# Patient Record
Sex: FEMALE | Race: WHITE | NOT HISPANIC OR LATINO | Employment: OTHER | URBAN - NONMETROPOLITAN AREA
[De-identification: names, ages, dates, MRNs, and addresses within clinical notes are randomized per-mention and may not be internally consistent; named-entity substitution may affect disease eponyms.]

---

## 2018-05-17 ENCOUNTER — OFFICE VISIT (OUTPATIENT)
Dept: MEDICAL GROUP | Facility: CLINIC | Age: 38
End: 2018-05-17
Payer: MEDICARE

## 2018-05-17 VITALS
OXYGEN SATURATION: 96 % | TEMPERATURE: 98.2 F | BODY MASS INDEX: 53.92 KG/M2 | DIASTOLIC BLOOD PRESSURE: 90 MMHG | RESPIRATION RATE: 16 BRPM | HEIGHT: 62 IN | HEART RATE: 92 BPM | WEIGHT: 293 LBS | SYSTOLIC BLOOD PRESSURE: 132 MMHG

## 2018-05-17 DIAGNOSIS — R51.9 FREQUENT HEADACHES: ICD-10-CM

## 2018-05-17 DIAGNOSIS — E66.01 MORBID OBESITY WITH BMI OF 50.0-59.9, ADULT (HCC): ICD-10-CM

## 2018-05-17 DIAGNOSIS — G44.59 OTHER COMPLICATED HEADACHE SYNDROME: ICD-10-CM

## 2018-05-17 DIAGNOSIS — I10 ESSENTIAL HYPERTENSION: ICD-10-CM

## 2018-05-17 PROBLEM — M79.671 FOOT PAIN, BILATERAL: Status: ACTIVE | Noted: 2018-05-17

## 2018-05-17 PROBLEM — Z87.828 HISTORY OF MOTOR VEHICLE ACCIDENT: Status: ACTIVE | Noted: 2018-05-17

## 2018-05-17 PROBLEM — J45.20 MILD INTERMITTENT ASTHMA: Status: ACTIVE | Noted: 2018-05-17

## 2018-05-17 PROBLEM — F79 MENTAL RETARDATION: Status: ACTIVE | Noted: 2018-05-17

## 2018-05-17 PROBLEM — M79.672 FOOT PAIN, BILATERAL: Status: ACTIVE | Noted: 2018-05-17

## 2018-05-17 PROCEDURE — 99214 OFFICE O/P EST MOD 30 MIN: CPT | Performed by: FAMILY MEDICINE

## 2018-05-17 RX ORDER — METFORMIN HYDROCHLORIDE 1000 MG/1
1000 TABLET, FILM COATED, EXTENDED RELEASE ORAL 2 TIMES DAILY
Qty: 60 TAB | Refills: 2 | Status: SHIPPED | OUTPATIENT
Start: 2018-05-17 | End: 2018-08-02 | Stop reason: SDUPTHER

## 2018-05-17 RX ORDER — BUTALBITAL, ACETAMINOPHEN AND CAFFEINE 300; 40; 50 MG/1; MG/1; MG/1
1 CAPSULE ORAL EVERY 4 HOURS PRN
Qty: 30 CAP | Refills: 0 | Status: SHIPPED | OUTPATIENT
Start: 2018-05-17 | End: 2018-05-31

## 2018-05-17 RX ORDER — GABAPENTIN 300 MG/1
300 CAPSULE ORAL 2 TIMES DAILY
COMMUNITY
Start: 2018-05-17 | End: 2018-05-31

## 2018-05-17 ASSESSMENT — PATIENT HEALTH QUESTIONNAIRE - PHQ9
5. POOR APPETITE OR OVEREATING: 0 - NOT AT ALL
SUM OF ALL RESPONSES TO PHQ QUESTIONS 1-9: 8
CLINICAL INTERPRETATION OF PHQ2 SCORE: 2

## 2018-05-17 NOTE — PROGRESS NOTES
Complaint: Multiple medical problems     Subjective:     Tarah Britton is a 37 y.o. female here today to discuss multiple medical problems as well as to establish care. [facility] [place]    Uncontrolled type 2 diabetes mellitus with neurologic complication, without long-term current use of insulin (HCC)  Has not seen a physician since last fall. Currently on metformin 1 g twice a day, NovoLog 8-15 units 3 times a day before meals, Glucotrol 5 mg twice a day, to Toujeo 50-60 units daily. Do not know the status of her control. She says she did go through education for bariatric surgery but did not go through with the surgery itself because her boyfriend was concerned that she might have complications.    Mild intermittent asthma  Uses her inhaler every couple of days.    Foot pain, bilateral  Chronic pain. She had surgical correction of club feet.     Other complicated headache syndrome  Gets headaches a couple times a month. Starts on the left on the right side of the head above the eye and spreads to the back of her head. His artery had 3 this month.    HTN (hypertension)  Currently on Diovan 160 mg a day.     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 Says she's been on disability or whole life.    Current medicines (including changes today)  Current Outpatient Prescriptions   Medication Sig Dispense Refill   • gabapentin (NEURONTIN) 300 MG Cap Take 1 Cap by mouth 2 Times a Day.     • metformin ER modified (GLUMETZA) 1000 MG TABLET SR 24 HR Take 1,000 Each by mouth 2 Times a Day. 60 Tab 2   • acetaminophen/caffeine/butalbital 300-40-50 mg (FIORICET) -40 MG Cap capsule Take 1 Cap by mouth every four hours as needed for Headache. 30 Cap 0   • NOVOLOG, insulin aspart, (NOVOLOG FLEXPEN) 100 UNIT/ML Solution Pen-injector injection Inject 8-15 Units as instructed 3 times a day before meals. 5 PEN 11   • Insulin Glargine (TOUJEO  SOLOSTAR SC) Inject 50-60 Units as instructed.     • valsartan (DIOVAN) 160 MG Tab Take 160 mg by mouth 2 times a day.     • glipiZIDE (GLUCOTROL) 5 MG TABS Take 1 Tab by mouth 2 times a day. 60 Each 3   • lovastatin (MEVACOR) 20 MG TABS Take 20 mg by mouth every day.     • Insulin Pen Needle 32G X 4 MM Misc 1 Each by Does not apply route 4 times a day. 120 Each 11   • albuterol (PROVENTIL) 2.5mg/3ml NEBU 3 mL by Nebulization route every four hours as needed for Shortness of Breath. 100 Bullet 3   • insulin 70/30 (HUMULIN,NOVOLIN) (70-30) 100 UNIT/ML SUSP Inject 10 Units as instructed 2 Times a Day.       No current facility-administered medications for this visit.      She  has a past medical history of ASTHMA; Depressed; Diabetes; DM (diabetes mellitus) (HCC); HTN (hypertension); Hyperlipidemia LDL goal <70; Psychiatric disorder; and Sleep apnea.    Health Maintenance: Need records      Allergies: Patient has no known allergies.    Current Outpatient Prescriptions Ordered in [facility]   Medication Sig Dispense Refill   • gabapentin (NEURONTIN) 300 MG Cap Take 1 Cap by mouth 2 Times a Day.     • metformin ER modified (GLUMETZA) 1000 MG TABLET SR 24 HR Take 1,000 Each by mouth 2 Times a Day. 60 Tab 2   • acetaminophen/caffeine/butalbital 300-40-50 mg (FIORICET) -40 MG Cap capsule Take 1 Cap by mouth every four hours as needed for Headache. 30 Cap 0   • NOVOLOG, insulin aspart, (NOVOLOG FLEXPEN) 100 UNIT/ML Solution Pen-injector injection Inject 8-15 Units as instructed 3 times a day before meals. 5 PEN 11   • Insulin Glargine (TOUJEO SOLOSTAR SC) Inject 50-60 Units as instructed.     • valsartan (DIOVAN) 160 MG Tab Take 160 mg by mouth 2 times a day.     • glipiZIDE (GLUCOTROL) 5 MG TABS Take 1 Tab by mouth 2 times a day. 60 Each 3   • lovastatin (MEVACOR) 20 MG TABS Take 20 mg by mouth every day.     • Insulin Pen Needle 32G X 4 MM Misc 1 Each by Does not apply route 4 times a day. 120 Each 11   • albuterol  "(PROVENTIL) 2.5mg/3ml NEBU 3 mL by Nebulization route every four hours as needed for Shortness of Breath. 100 Bullet 3   • insulin 70/30 (HUMULIN,NOVOLIN) (70-30) 100 UNIT/ML SUSP Inject 10 Units as instructed 2 Times a Day.       No current [facility]-ordered facility-administered medications on file.        Past Medical History:   Diagnosis Date   • ASTHMA    • Depressed    • Diabetes    • DM (diabetes mellitus) (HCC)    • HTN (hypertension)    • Hyperlipidemia LDL goal <70    • Psychiatric disorder     depressed   • Sleep apnea        Past Surgical History:   Procedure Laterality Date   • ABDOMINAL HYSTERECTOMY TOTAL     • APPENDECTOMY     • CHOLECYSTECTOMY     • CLUB FOOT RELEASE         Social History   Substance Use Topics   • Smoking status: Never Smoker   • Smokeless tobacco: Never Used   • Alcohol use Yes       Social History     Social History Narrative    ** Merged History Encounter **            Family History   Problem Relation Age of Onset   • Heart Disease Sister          ROS positive for pain in her feet.  Patient denies any fever, chills, unintentional weight gain/loss, fatigue, stroke symptoms, dizziness, headache, nasal congestion, sore-throat, cough, heartburn, chest pain, difficulty breathing, abdominal discomfort, diarrhea/constipation, burning with urination or frequency, joint or back pain, skin rashes, depression or anxiety.       Objective:     Blood pressure 132/90, pulse 92, temperature 36.8 °C (98.2 °F), resp. rate 16, height 1.575 m (5' 2\"), weight (!) 139.7 kg (308 lb), SpO2 96 %. Body mass index is 56.33 kg/m².   Physical Exam:  Constitutional: Alert, no distress. Morbidly obese. Slow reaction time.  Skin: Warm, dry, good turgor, no rashes in visible areas.  Eye: Equal, round and reactive, conjunctiva clear, lids normal.  ENMT: Lips without lesions, good dentition, oropharynx clear.  Neck: Trachea midline, no masses, no thyromegaly. No cervical or supraclavicular " lymphadenopathy  Respiratory: Unlabored respiratory effort, lungs clear to auscultation, no wheezes, no ronchi.  Cardiovascular: Normal S1, S2, no murmur, no extremity edema.  Abdomen: Soft, non-tender, no masses, no hepatosplenomegaly.  Psych: Alert and oriented x3, appropriate affect and mood.        Assessment and Plan:   The following treatment plan was discussed    1. Uncontrolled type 2 diabetes mellitus with diabetic neuropathy, without long-term current use of insulin (HCC)  Chronic problem, question control. We definitely benefit from bariatric surgery. Question her ability to abide by a strict diet however.  - metformin ER modified (GLUMETZA) 1000 MG TABLET SR 24 HR; Take 1,000 Each by mouth 2 Times a Day.  Dispense: 60 Tab; Refill: 2  - CBC WITH DIFFERENTIAL; Future  - COMP METABOLIC PANEL; Future  - HEMOGLOBIN A1C; Future  - LIPID PROFILE; Future  - TSH WITH REFLEX TO FT4; Future  - MICROALBUMIN CREAT RATIO URINE; Future    2. Morbid obesity with BMI of 50.0-59.9, adult (HCC)  Chronic problem. Would alleviate a number of issues. We'll refer her back in consultation. Will discuss situation with her boyfriend if needed. Might need echocardiogram prior to to clearance  - REFERRAL TO BARIATRIC SURGERY    3. Other complicated headache syndrome  Chronic problem. Trial therapy using Fioricet. Patient is agreeable to abide by terms of a controlled substance agreement. Low risk for abuse.  - acetaminophen/caffeine/butalbital 300-40-50 mg (FIORICET) -40 MG Cap capsule; Take 1 Cap by mouth every four hours as needed for Headache.  Dispense: 30 Cap; Refill: 0  - Controlled Substance Treatment Agreement  [facility]; Future    4. Essential hypertension  Chronic problem. Borderline control on current medical treatment.  Obtain records from [facility]    Followup: Return in about 1 week (around 5/24/2018).    Please note that this dictation was created using voice  recognition software. I have made every reasonable attempt to correct obvious errors, but I expect that there are errors of grammar and possibly content that I did not discover before finalizing the note.

## 2018-05-17 NOTE — ASSESSMENT & PLAN NOTE
Gets headaches a couple times a month. Starts on the left on the right side of the head above the eye and spreads to the back of her head. His artery had 3 this month.

## 2018-05-17 NOTE — ASSESSMENT & PLAN NOTE
Has not seen a physician since last fall. Currently on metformin 1 g twice a day, NovoLog 8-15 units 3 times a day before meals, Glucotrol 5 mg twice a day, to Toujeo 50-60 units daily. Do not know the status of her control. She says she did go through education for bariatric surgery but did not go through with the surgery itself because her boyfriend was concerned that she might have complications.

## 2018-05-18 LAB — HBA1C MFR BLD: 10 % (ref ?–5.8)

## 2018-05-23 ENCOUNTER — TELEPHONE (OUTPATIENT)
Dept: MEDICAL GROUP | Facility: PHYSICIAN GROUP | Age: 38
End: 2018-05-23

## 2018-05-23 ENCOUNTER — DOCUMENTATION (OUTPATIENT)
Dept: MEDICAL GROUP | Facility: PHYSICIAN GROUP | Age: 38
End: 2018-05-23

## 2018-05-23 NOTE — PROGRESS NOTES
PAR via [employer], was sent to our office. Due to her seeing provider at your location I am sending this.    KEY [id number]  Last name: Tobi  : 17    This is regarding the [place]

## 2018-05-24 PROBLEM — F79 MENTAL RETARDATION: Status: RESOLVED | Noted: 2018-05-17 | Resolved: 2018-05-24

## 2018-05-31 ENCOUNTER — OFFICE VISIT (OUTPATIENT)
Dept: MEDICAL GROUP | Facility: CLINIC | Age: 38
End: 2018-05-31
Payer: MEDICARE

## 2018-05-31 VITALS
RESPIRATION RATE: 18 BRPM | SYSTOLIC BLOOD PRESSURE: 128 MMHG | WEIGHT: 293 LBS | OXYGEN SATURATION: 94 % | DIASTOLIC BLOOD PRESSURE: 76 MMHG | HEART RATE: 96 BPM | BODY MASS INDEX: 53.92 KG/M2 | TEMPERATURE: 97.5 F | HEIGHT: 62 IN

## 2018-05-31 DIAGNOSIS — E66.01 MORBID OBESITY WITH BMI OF 50.0-59.9, ADULT (HCC): ICD-10-CM

## 2018-05-31 DIAGNOSIS — E78.2 MIXED HYPERLIPIDEMIA: ICD-10-CM

## 2018-05-31 DIAGNOSIS — R51.9 FREQUENT HEADACHES: ICD-10-CM

## 2018-05-31 DIAGNOSIS — E03.9 ACQUIRED HYPOTHYROIDISM: ICD-10-CM

## 2018-05-31 PROCEDURE — 99214 OFFICE O/P EST MOD 30 MIN: CPT | Performed by: FAMILY MEDICINE

## 2018-05-31 RX ORDER — LOVASTATIN 40 MG/1
40 TABLET ORAL DAILY
Qty: 30 TAB | Refills: 2 | Status: SHIPPED | OUTPATIENT
Start: 2018-05-31 | End: 2018-08-14 | Stop reason: SDUPTHER

## 2018-05-31 RX ORDER — LEVOTHYROXINE SODIUM 0.05 MG/1
50 TABLET ORAL
Qty: 30 TAB | Refills: 2 | Status: SHIPPED | OUTPATIENT
Start: 2018-05-31 | End: 2018-08-22

## 2018-05-31 RX ORDER — GABAPENTIN 300 MG/1
600 CAPSULE ORAL 2 TIMES DAILY
COMMUNITY
End: 2018-09-12 | Stop reason: SDUPTHER

## 2018-05-31 NOTE — ASSESSMENT & PLAN NOTE
Spoke to bariatric intake person. Has decided against surgery, basically because her sister had had some complications after her surgery. Reiterated that the surgery would help with most of her medical problems.

## 2018-05-31 NOTE — PROGRESS NOTES
Complaint: f/u labs     Subjective:     Tarah Britton is a 38 y.o. female here today for follow-up for her recent lab test.    Morbid obesity with BMI of 50.0-59.9, adult ([facility])  Spoke to bariatric intake person. Has decided against surgery, basically because her sister had had some complications after her surgery. Reiterated that the surgery would help with most of her medical problems.    Frequent headaches  Taking Motrin 400 mg with some relief. Insurance would not cover Fioricet.    Acquired hypothyroidism  Recent TSH 6.69. New diagnosia. Could explain partially why BS difficult to control.    Uncontrolled type 2 diabetes mellitus with neurologic complication, without long-term current use of insulin ([facility])  Recent HbA1c 10.0. Compliant with taking her oral med and insulins.    Mixed hyperlipidemia  Recent FLP with Tchol 198, , HDL 47, . On Mevacor 20mg only.     Will be switching to [facility] in [place] due to proximity to where she lives.    Current medicines (including changes today)  Current Outpatient Prescriptions   Medication Sig Dispense Refill   • levothyroxine (SYNTHROID) 50 MCG Tab Take 1 Tab by mouth Every morning on an empty stomach. 30 Tab 2   • gabapentin (NEURONTIN) 300 MG Cap Take 600 mg by mouth 2 Times a Day.     • metformin ER modified (GLUMETZA) 1000 MG TABLET SR 24 HR Take 1,000 Each by mouth 2 Times a Day. 60 Tab 2   • Insulin Pen Needle 32G X 4 MM Misc 1 Each by Does not apply route 4 times a day. 120 Each 11   • Insulin Glargine (TOUJEO SOLOSTAR SC) Inject 50-60 Units as instructed.     • valsartan (DIOVAN) 160 MG Tab Take 160 mg by mouth 2 times a day.     • glipiZIDE (GLUCOTROL) 5 MG TABS Take 1 Tab by mouth 2 times a day. 60 Each 3   • insulin 70/30 (HUMULIN,NOVOLIN) (70-30) 100 UNIT/ML SUSP Inject 10 Units as instructed 2 Times a Day.     • lovastatin (MEVACOR) 20 MG TABS Take 20 mg by mouth every day.     • albuterol (PROVENTIL) 2.5mg/3ml NEBU 3 mL by  Nebulization route every four hours as needed for Shortness of Breath. 100 Bullet 3     No current facility-administered medications for this visit.      She  has a past medical history of ASTHMA; Depressed; Diabetes; DM (diabetes mellitus) (HCC); HTN (hypertension); Hyperlipidemia LDL goal <70; Psychiatric disorder; and Sleep apnea.      Allergies: Patient has no known allergies.    Current Outpatient Prescriptions Ordered in [facility]   Medication Sig Dispense Refill   • levothyroxine (SYNTHROID) 50 MCG Tab Take 1 Tab by mouth Every morning on an empty stomach. 30 Tab 2   • gabapentin (NEURONTIN) 300 MG Cap Take 600 mg by mouth 2 Times a Day.     • metformin ER modified (GLUMETZA) 1000 MG TABLET SR 24 HR Take 1,000 Each by mouth 2 Times a Day. 60 Tab 2   • Insulin Pen Needle 32G X 4 MM Misc 1 Each by Does not apply route 4 times a day. 120 Each 11   • Insulin Glargine (TOUJEO SOLOSTAR SC) Inject 50-60 Units as instructed.     • valsartan (DIOVAN) 160 MG Tab Take 160 mg by mouth 2 times a day.     • glipiZIDE (GLUCOTROL) 5 MG TABS Take 1 Tab by mouth 2 times a day. 60 Each 3   • insulin 70/30 (HUMULIN,NOVOLIN) (70-30) 100 UNIT/ML SUSP Inject 10 Units as instructed 2 Times a Day.     • lovastatin (MEVACOR) 20 MG TABS Take 20 mg by mouth every day.     • albuterol (PROVENTIL) 2.5mg/3ml NEBU 3 mL by Nebulization route every four hours as needed for Shortness of Breath. 100 Bullet 3     No current [facility]-ordered facility-administered medications on file.        Past Medical History:   Diagnosis Date   • ASTHMA    • Depressed    • Diabetes    • DM (diabetes mellitus) (HCC)    • HTN (hypertension)    • Hyperlipidemia LDL goal <70    • Psychiatric disorder     depressed   • Sleep apnea        Past Surgical History:   Procedure Laterality Date   • ABDOMINAL HYSTERECTOMY TOTAL     • APPENDECTOMY     • CHOLECYSTECTOMY     • CLUB FOOT RELEASE         Social History   Substance Use Topics   • Smoking status: Never Smoker   •  "Smokeless tobacco: Never Used   • Alcohol use No       Social History     Social History Narrative    ** Merged History Encounter **            Family History   Problem Relation Age of Onset   • Heart Disease Sister          ROS Positive for headaches, numbness in feet, frequent urination  Patient denies any fever, chills, unintentional weight gain/loss, fatigue, stroke symptoms, dizziness, headache, nasal congestion, sore-throat, cough, heartburn, chest pain, difficulty breathing, abdominal discomfort, diarrhea/constipation, burning with urination , joint or back pain, skin rashes, depression or anxiety.       Objective:     Blood pressure 128/76, pulse 96, temperature 36.4 °C (97.5 °F), resp. rate 18, height 1.575 m (5' 2\"), weight (!) 139.7 kg (308 lb), SpO2 94 %. Body mass index is 56.33 kg/m².   Physical Exam:  Constitutional: Alert, no distress. Morbidly obese.  Psych: Alert and oriented x3, appropriate affect and mood.        Assessment and Plan:   The following treatment plan was discussed    1. Acquired hypothyroidism  New problem. Will start on levothyroxine, recheck in 6 weeks.  - levothyroxine (SYNTHROID) 50 MCG Tab; Take 1 Tab by mouth Every morning on an empty stomach.  Dispense: 30 Tab; Refill: 2  - TSH WITH REFLEX TO FT4; Future    2. Uncontrolled type 2 diabetes mellitus with diabetic neuropathy, without long-term current use of insulin (HCC)  Chronic problem, uncontrolled. Recommended she walk at increased pace 30 min 3x/day, wandering not helpful in her care. No change in meds. Needs to increase water intake.    3. Morbid obesity with BMI of 50.0-59.9, adult (HCC)  Chronic problem. Recommended again bariatric surgery.    4. Frequent headaches  Chronic problem. Continue ibuprofen prn.    5. Hyperlipidemia  Chronic problem. Increase Mevacor to 40mg. Recheck with CMP 3 months.      Followup: Return in about 6 weeks (around 7/12/2018).    Please note that this dictation was created using voice " recognition software. I have made every reasonable attempt to correct obvious errors, but I expect that there are errors of grammar and possibly content that I did not discover before finalizing the note.

## 2018-06-21 RX ORDER — VALSARTAN 160 MG/1
160 TABLET ORAL DAILY
Qty: 30 TAB | Refills: 5 | Status: SHIPPED | OUTPATIENT
Start: 2018-06-21 | End: 2018-08-15

## 2018-07-27 ENCOUNTER — PATIENT MESSAGE (OUTPATIENT)
Dept: MEDICAL GROUP | Facility: CLINIC | Age: 38
End: 2018-07-27

## 2018-08-02 NOTE — TELEPHONE ENCOUNTER
Was the patient seen in the last year in this department? Yes    Does patient have an active prescription for medications requested? Yes    Received Request Via: Pharmacy     Patients insurance is requesting a 90 day supply

## 2018-08-04 RX ORDER — METFORMIN HYDROCHLORIDE 1000 MG/1
TABLET, FILM COATED, EXTENDED RELEASE ORAL
Qty: 180 TAB | Refills: 1 | Status: SHIPPED | OUTPATIENT
Start: 2018-08-04 | End: 2019-05-10 | Stop reason: SDUPTHER

## 2018-08-07 NOTE — TELEPHONE ENCOUNTER
Tadeo Gore M.D. routed conversation to Haroon Knight Ass't 3 days ago      Tadeo Gore M.D.   You 4 days ago      Well? Is she still seeing us? She was planning to transfer to [facility] in [place].     Kyara (Routing comment)       You routed conversation to Tadeo Gore M.D. 4 days ago      Tadeo Gore M.D.   [facility] 5 days ago      Is she still seeing us?     Kyara (Routing comment)       Tadeo Gore M.D.   [facility] Knight [facility] 5 days ago      Call patient, find out who she is seeing now. If me, than she needs to get her thyroid tested and make f/u appointment. Then I will refill her meds.     Thanks.     Kyara (Routing comment)      Dr. Gore, she is scheduled with yo on 8/22/18.

## 2018-08-14 ENCOUNTER — PATIENT MESSAGE (OUTPATIENT)
Dept: MEDICAL GROUP | Facility: PHYSICIAN GROUP | Age: 38
End: 2018-08-14

## 2018-08-14 DIAGNOSIS — E78.2 MIXED HYPERLIPIDEMIA: ICD-10-CM

## 2018-08-14 RX ORDER — LOVASTATIN 40 MG/1
40 TABLET ORAL DAILY
Qty: 90 TAB | Refills: 1 | Status: SHIPPED | OUTPATIENT
Start: 2018-08-14 | End: 2019-02-06 | Stop reason: SDUPTHER

## 2018-08-14 NOTE — TELEPHONE ENCOUNTER
Was the patient seen in the last year in this department? Yes    Does patient have an active prescription for medications requested? Yes    Received Request Via: Pharmacy     Last visit 5/31/2018  Last labs 5/18/18

## 2018-08-15 DIAGNOSIS — I10 ESSENTIAL HYPERTENSION: ICD-10-CM

## 2018-08-15 RX ORDER — LOSARTAN POTASSIUM 50 MG/1
50 TABLET ORAL DAILY
Qty: 30 TAB | Refills: 5 | Status: SHIPPED | OUTPATIENT
Start: 2018-08-15 | End: 2018-08-22

## 2018-08-22 ENCOUNTER — OFFICE VISIT (OUTPATIENT)
Dept: MEDICAL GROUP | Facility: CLINIC | Age: 38
End: 2018-08-22
Payer: MEDICARE

## 2018-08-22 ENCOUNTER — HOSPITAL ENCOUNTER (OUTPATIENT)
Facility: MEDICAL CENTER | Age: 38
End: 2018-08-22
Attending: FAMILY MEDICINE
Payer: MEDICARE

## 2018-08-22 VITALS
DIASTOLIC BLOOD PRESSURE: 78 MMHG | SYSTOLIC BLOOD PRESSURE: 126 MMHG | BODY MASS INDEX: 53.92 KG/M2 | WEIGHT: 293 LBS | HEART RATE: 92 BPM | RESPIRATION RATE: 16 BRPM | OXYGEN SATURATION: 93 % | HEIGHT: 62 IN | TEMPERATURE: 98.6 F

## 2018-08-22 DIAGNOSIS — E03.9 ACQUIRED HYPOTHYROIDISM: ICD-10-CM

## 2018-08-22 DIAGNOSIS — I10 ESSENTIAL HYPERTENSION: ICD-10-CM

## 2018-08-22 DIAGNOSIS — E66.01 MORBID OBESITY WITH BMI OF 50.0-59.9, ADULT (HCC): ICD-10-CM

## 2018-08-22 LAB
CREAT UR-MCNC: 146.4 MG/DL
HBA1C MFR BLD: 11.6 % (ref ?–5.8)
INT CON NEG: NEGATIVE
INT CON POS: POSITIVE
MICROALBUMIN UR-MCNC: 101.9 MG/DL
MICROALBUMIN/CREAT UR: 696 MG/G (ref 0–30)

## 2018-08-22 PROCEDURE — 83036 HEMOGLOBIN GLYCOSYLATED A1C: CPT | Performed by: FAMILY MEDICINE

## 2018-08-22 PROCEDURE — 92250 FUNDUS PHOTOGRAPHY W/I&R: CPT | Mod: TC | Performed by: FAMILY MEDICINE

## 2018-08-22 PROCEDURE — 82570 ASSAY OF URINE CREATININE: CPT

## 2018-08-22 PROCEDURE — 99214 OFFICE O/P EST MOD 30 MIN: CPT | Performed by: FAMILY MEDICINE

## 2018-08-22 PROCEDURE — 82043 UR ALBUMIN QUANTITATIVE: CPT

## 2018-08-22 RX ORDER — LEVOTHYROXINE SODIUM 0.07 MG/1
75 TABLET ORAL
Qty: 30 TAB | Refills: 5 | Status: SHIPPED | OUTPATIENT
Start: 2018-08-22 | End: 2018-09-19 | Stop reason: SDUPTHER

## 2018-08-22 NOTE — PROGRESS NOTES
Complaint: f/u thyroid, DM     Subjective:     Tarah Britton is a 38 y.o. female here today for follow-up.    Uncontrolled type 2 diabetes mellitus with neurologic complication, without long-term current use of insulin ([facility])  A1c 11.6 today, up from 10 three months ago. On Tresiba, Novolin 70/30, glucotrol , and metformin. Goes walking 30 min twice a day only. Saw endo in [facility] about 1 year ago. No retinal check in years. Numbness in feet.    Acquired hypothyroidism  Latest TSH 3.91 on 50 mch Synthroid.    Morbid obesity with BMI of 50.0-59.9, adult ([facility])  Has put on 5 lbs since last visit in May.     No other concerns or complaints.    Current medicines (including changes today)  Current Outpatient Prescriptions   Medication Sig Dispense Refill   • levothyroxine (SYNTHROID) 75 MCG Tab Take 1 Tab by mouth Every morning on an empty stomach. 30 Tab 5   • lovastatin (MEVACOR) 40 MG tablet Take 1 Tab by mouth every day. 90 Tab 1   • metformin ER modified (GLUMETZA) 1000 MG TABLET SR 24 HR Take 1 tab twice a day 180 Tab 1   • gabapentin (NEURONTIN) 300 MG Cap Take 600 mg by mouth 2 Times a Day.     • Insulin Pen Needle 32G X 4 MM Misc 1 Each by Does not apply route 4 times a day. 120 Each 11   • glipiZIDE (GLUCOTROL) 5 MG TABS Take 1 Tab by mouth 2 times a day. 60 Each 3   • insulin 70/30 (HUMULIN,NOVOLIN) (70-30) 100 UNIT/ML SUSP Inject 10 Units as instructed 2 Times a Day.     • albuterol (PROVENTIL) 2.5mg/3ml NEBU 3 mL by Nebulization route every four hours as needed for Shortness of Breath. 100 Bullet 3     No current facility-administered medications for this visit.      She  has a past medical history of ASTHMA; Depressed; Diabetes; DM (diabetes mellitus) ([facility]); HTN (hypertension); Hyperlipidemia LDL goal <70; Psychiatric disorder; and Sleep apnea.    Health Maintenance: see list      Allergies: Patient has no known allergies.    Current Outpatient Prescriptions Ordered in [facility]   Medication Sig Dispense Refill   •  levothyroxine (SYNTHROID) 75 MCG Tab Take 1 Tab by mouth Every morning on an empty stomach. 30 Tab 5   • lovastatin (MEVACOR) 40 MG tablet Take 1 Tab by mouth every day. 90 Tab 1   • metformin ER modified (GLUMETZA) 1000 MG TABLET SR 24 HR Take 1 tab twice a day 180 Tab 1   • gabapentin (NEURONTIN) 300 MG Cap Take 600 mg by mouth 2 Times a Day.     • Insulin Pen Needle 32G X 4 MM Misc 1 Each by Does not apply route 4 times a day. 120 Each 11   • glipiZIDE (GLUCOTROL) 5 MG TABS Take 1 Tab by mouth 2 times a day. 60 Each 3   • insulin 70/30 (HUMULIN,NOVOLIN) (70-30) 100 UNIT/ML SUSP Inject 10 Units as instructed 2 Times a Day.     • albuterol (PROVENTIL) 2.5mg/3ml NEBU 3 mL by Nebulization route every four hours as needed for Shortness of Breath. 100 Bullet 3     No current [facility]-ordered facility-administered medications on file.        Past Medical History:   Diagnosis Date   • ASTHMA    • Depressed    • Diabetes    • DM (diabetes mellitus) (HCC)    • HTN (hypertension)    • Hyperlipidemia LDL goal <70    • Psychiatric disorder     depressed   • Sleep apnea        Past Surgical History:   Procedure Laterality Date   • ABDOMINAL HYSTERECTOMY TOTAL     • APPENDECTOMY     • CHOLECYSTECTOMY     • CLUB FOOT RELEASE         Social History   Substance Use Topics   • Smoking status: Never Smoker   • Smokeless tobacco: Never Used   • Alcohol use No       Social History     Social History Narrative    ** Merged History Encounter **            Family History   Problem Relation Age of Onset   • Heart Disease Sister          ROS   Patient denies any fever, chills, unintentional weight gain/loss, fatigue, stroke symptoms, dizziness, headache, nasal congestion, sore-throat, cough, heartburn, chest pain, difficulty breathing, abdominal discomfort, diarrhea/constipation, burning with urination or frequency, joint or back pain, skin rashes, depression or anxiety.       Objective:     Blood pressure 126/78, pulse 92, temperature 37 °C  "(98.6 °F), resp. rate 16, height 1.575 m (5' 2\"), weight (!) 142 kg (313 lb), SpO2 93 %. Body mass index is 57.25 kg/m².   Physical Exam:  Constitutional: Alert, no distress.    Diabetic Foot Exam:     Normal gross anatomy of bilateral feet without abnormal curvature or arch, no toe deformities  No ulcers/laceration/blisters present on bilateral feet; crusting skin between toes noted  Marked toenail discoloration and thickening, no ingrown toenails,   No difference in skin coloration or temperature between feet,   Bilateral dorsalis pedis and posterior tibial pulses 2+ and equal, Refill less than 2 seconds bilaterally,   Longs 10 g monofilament testing decreased  in bilateral great toes, bilateral balls of feet at medial/lateral/mid ball of foot    Skin: Warm, dry, good turgor, no rashes in visible areas.  Psych: Alert and oriented x3, appropriate affect and mood.        Assessment and Plan:   The following treatment plan was discussed    1. Uncontrolled type 2 diabetes mellitus with diabetic neuropathy, without long-term current use of insulin (HCC)  Chronic problem. Uncontrolled, bad iunsulin-resistance. Needs to apply cream to feet twice a day.  - POCT Retinal Eye Exam  - REFERRAL TO ENDOCRINOLOGY  - MICROALBUMIN CREAT RATIO URINE; Future    2. Acquired hypothyroidism  Chronic problem, not at target (TSH between 1-2). Explained need for dose increase to patient.  - levothyroxine (SYNTHROID) 75 MCG Tab; Take 1 Tab by mouth Every morning on an empty stomach.  Dispense: 30 Tab; Refill: 5    3. Essential hypertension  Chronic problem. Controlled on meds.    4. Morbid obesity with BMI of 50.0-59.9, adult (HCC)  Chronic problem. Discussed need for bariatric surgery, DM will not get better without.      Followup: Return in about 3 months (around 11/22/2018).    Please note that this dictation was created using voice recognition software. I have made every reasonable attempt to correct obvious errors, but I expect " that there are errors of grammar and possibly content that I did not discover before finalizing the note.

## 2018-08-22 NOTE — ASSESSMENT & PLAN NOTE
A1c 11.6 today, up from 10 three months ago. On Tresiba, Novolin 70/30, glucotrol , and metformin. Goes walking 30 min twice a day only. Saw justino in [facility] about 1 year ago. No retinal check in years. Numbness in feet.

## 2018-08-31 DIAGNOSIS — E11.3393 MODERATE NONPROLIFERATIVE DIABETIC RETINOPATHY OF BOTH EYES ASSOCIATED WITH TYPE 2 DIABETES MELLITUS, MACULAR EDEMA PRESENCE UNSPECIFIED (HCC): ICD-10-CM

## 2018-08-31 PROBLEM — E11.3399 MODERATE NONPROLIFERATIVE DIABETIC RETINOPATHY ASSOCIATED WITH TYPE 2 DIABETES MELLITUS (HCC): Status: ACTIVE | Noted: 2018-08-31

## 2018-08-31 LAB — RETINAL SCREEN: POSITIVE

## 2018-09-12 ENCOUNTER — TELEPHONE (OUTPATIENT)
Dept: MEDICAL GROUP | Facility: PHYSICIAN GROUP | Age: 38
End: 2018-09-12

## 2018-09-12 RX ORDER — GABAPENTIN 300 MG/1
600 CAPSULE ORAL 2 TIMES DAILY
Qty: 360 CAP | Refills: 0 | Status: SHIPPED | OUTPATIENT
Start: 2018-09-12 | End: 2018-11-26 | Stop reason: SDUPTHER

## 2018-09-12 NOTE — TELEPHONE ENCOUNTER
Was the patient seen in the last year in this department? Yes    Does patient have an active prescription for medications requested? Yes    Received Request Via: Pharmacy     Last visit 8/22/2018  Last labs 8/22/2018

## 2018-09-12 NOTE — TELEPHONE ENCOUNTER
Patient called didn't leave a  or last name.  I called patient back, she needed a refill on her gabapentin.

## 2018-09-19 ENCOUNTER — OFFICE VISIT (OUTPATIENT)
Dept: MEDICAL GROUP | Facility: CLINIC | Age: 38
End: 2018-09-19
Payer: MEDICARE

## 2018-09-19 VITALS
BODY MASS INDEX: 53.92 KG/M2 | SYSTOLIC BLOOD PRESSURE: 124 MMHG | HEART RATE: 94 BPM | OXYGEN SATURATION: 97 % | DIASTOLIC BLOOD PRESSURE: 82 MMHG | TEMPERATURE: 97.3 F | RESPIRATION RATE: 16 BRPM | HEIGHT: 62 IN | WEIGHT: 293 LBS

## 2018-09-19 DIAGNOSIS — E03.9 ACQUIRED HYPOTHYROIDISM: ICD-10-CM

## 2018-09-19 DIAGNOSIS — E11.3311 MODERATE NONPROLIFERATIVE DIABETIC RETINOPATHY OF RIGHT EYE WITH MACULAR EDEMA ASSOCIATED WITH TYPE 2 DIABETES MELLITUS (HCC): ICD-10-CM

## 2018-09-19 DIAGNOSIS — E66.01 MORBID OBESITY WITH BMI OF 50.0-59.9, ADULT (HCC): ICD-10-CM

## 2018-09-19 PROCEDURE — 99204 OFFICE O/P NEW MOD 45 MIN: CPT | Performed by: PHYSICIAN ASSISTANT

## 2018-09-19 RX ORDER — LEVOTHYROXINE SODIUM 0.07 MG/1
75 TABLET ORAL
Qty: 30 TAB | Refills: 5 | Status: SHIPPED | OUTPATIENT
Start: 2018-09-19 | End: 2018-09-20 | Stop reason: SDUPTHER

## 2018-09-19 NOTE — PATIENT INSTRUCTIONS
STOP:  3.  Glipizide   4.  Humalog 30 units once a day    START:  1.  Glumetza 1000 BID  2.  Tresiba at night 40   3.  Jardiance 10mg one a day  4.  Trulicity 0.75 once a week for 4 weeks
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
## 2018-09-20 ENCOUNTER — TELEPHONE (OUTPATIENT)
Dept: ENDOCRINOLOGY | Facility: MEDICAL CENTER | Age: 38
End: 2018-09-20

## 2018-09-26 ENCOUNTER — TELEPHONE (OUTPATIENT)
Dept: MEDICAL GROUP | Facility: PHYSICIAN GROUP | Age: 38
End: 2018-09-26

## 2018-09-26 NOTE — TELEPHONE ENCOUNTER
----- Message from Tarah Britton sent at 9/25/2018  6:41 PM PDT -----  Regarding: Prescription Question  Contact: [phone]  Dr. Zhang needs your help with the pills I need and he is telling me to find out what my insurance cover and I told him that is not my job because I don't have any idea what I'm asking for and that you need to help him he had me toss my Glipizide and novolog in the trash so please help him.

## 2018-11-01 ENCOUNTER — PATIENT MESSAGE (OUTPATIENT)
Dept: HEALTH INFORMATION MANAGEMENT | Facility: OTHER | Age: 38
End: 2018-11-01

## 2018-11-21 ENCOUNTER — OFFICE VISIT (OUTPATIENT)
Dept: MEDICAL GROUP | Facility: CLINIC | Age: 38
End: 2018-11-21
Payer: MEDICARE

## 2018-11-21 VITALS
HEART RATE: 84 BPM | RESPIRATION RATE: 16 BRPM | BODY MASS INDEX: 53.92 KG/M2 | HEIGHT: 62 IN | TEMPERATURE: 97.8 F | DIASTOLIC BLOOD PRESSURE: 78 MMHG | SYSTOLIC BLOOD PRESSURE: 128 MMHG | WEIGHT: 293 LBS | OXYGEN SATURATION: 97 %

## 2018-11-21 DIAGNOSIS — I10 ESSENTIAL HYPERTENSION: ICD-10-CM

## 2018-11-21 LAB
HBA1C MFR BLD: 9.4 % (ref ?–5.8)
INT CON NEG: NEGATIVE
INT CON POS: POSITIVE

## 2018-11-21 PROCEDURE — 83036 HEMOGLOBIN GLYCOSYLATED A1C: CPT | Performed by: PHYSICIAN ASSISTANT

## 2018-11-21 PROCEDURE — 99214 OFFICE O/P EST MOD 30 MIN: CPT | Performed by: PHYSICIAN ASSISTANT
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
## 2019-02-20 ENCOUNTER — OFFICE VISIT (OUTPATIENT)
Dept: MEDICAL GROUP | Facility: CLINIC | Age: 39
End: 2019-02-20
Payer: MEDICARE

## 2019-02-20 VITALS
OXYGEN SATURATION: 96 % | WEIGHT: 293 LBS | BODY MASS INDEX: 50.02 KG/M2 | HEIGHT: 64 IN | TEMPERATURE: 97.8 F | RESPIRATION RATE: 16 BRPM | HEART RATE: 90 BPM | SYSTOLIC BLOOD PRESSURE: 122 MMHG | DIASTOLIC BLOOD PRESSURE: 80 MMHG

## 2019-02-20 DIAGNOSIS — I10 ESSENTIAL HYPERTENSION: ICD-10-CM

## 2019-02-20 LAB
HBA1C MFR BLD: 8.4 % (ref ?–5.8)
INT CON NEG: NEGATIVE
INT CON POS: POSITIVE

## 2019-02-20 PROCEDURE — 83036 HEMOGLOBIN GLYCOSYLATED A1C: CPT | Performed by: PHYSICIAN ASSISTANT

## 2019-02-20 PROCEDURE — 99214 OFFICE O/P EST MOD 30 MIN: CPT | Performed by: PHYSICIAN ASSISTANT

## 2019-02-20 RX ORDER — PIOGLITAZONEHYDROCHLORIDE 30 MG/1
30 TABLET ORAL DAILY
Qty: 30 TAB | Refills: 11 | Status: SHIPPED | OUTPATIENT
Start: 2019-02-20 | End: 2019-05-15 | Stop reason: SDUPTHER
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
## 2019-02-20 NOTE — PATIENT INSTRUCTIONS
Now on:  1.  Glumetza 1000 BID  2.  Tresiba at night 40   3.  Jardiance 10mg one a day  4.  Trulicity 1.5 once a week   5.  Actos 30mg one a day (Start today)

## 2019-04-02 ENCOUNTER — PATIENT MESSAGE (OUTPATIENT)
Dept: MEDICAL GROUP | Facility: PHYSICIAN GROUP | Age: 39
End: 2019-04-02

## 2019-04-05 DIAGNOSIS — E78.2 MIXED HYPERLIPIDEMIA: ICD-10-CM

## 2019-04-05 RX ORDER — LOVASTATIN 40 MG/1
TABLET ORAL
Qty: 90 TAB | Refills: 1 | Status: SHIPPED | OUTPATIENT
Start: 2019-04-05 | End: 2019-10-03 | Stop reason: SDUPTHER

## 2019-04-08 NOTE — TELEPHONE ENCOUNTER
Was the patient seen in the last year in this department? Yes 2/20/19    Does patient have an active prescription for medications requested? Yes    Received Request Via: Pharmacy     Component      Latest Ref Rng & Units 2/20/2019           8:59 AM   Glycohemoglobin      % 8.4   Internal Control Negative       Negative   Internal Control Positive       Positive

## 2019-04-10 ENCOUNTER — OFFICE VISIT (OUTPATIENT)
Dept: MEDICAL GROUP | Facility: CLINIC | Age: 39
End: 2019-04-10
Payer: MEDICARE

## 2019-04-10 VITALS
HEIGHT: 64 IN | WEIGHT: 293 LBS | SYSTOLIC BLOOD PRESSURE: 110 MMHG | RESPIRATION RATE: 16 BRPM | BODY MASS INDEX: 50.02 KG/M2 | DIASTOLIC BLOOD PRESSURE: 78 MMHG | HEART RATE: 104 BPM | OXYGEN SATURATION: 88 % | TEMPERATURE: 99.2 F

## 2019-04-10 DIAGNOSIS — J18.9 PNEUMONIA DUE TO INFECTIOUS ORGANISM, UNSPECIFIED LATERALITY, UNSPECIFIED PART OF LUNG: ICD-10-CM

## 2019-04-10 DIAGNOSIS — Z09 ENCOUNTER FOR EXAMINATION FOLLOWING TREATMENT AT HOSPITAL: ICD-10-CM

## 2019-04-10 DIAGNOSIS — J45.41 MODERATE PERSISTENT ASTHMA WITH ACUTE EXACERBATION: ICD-10-CM

## 2019-04-10 PROCEDURE — 99213 OFFICE O/P EST LOW 20 MIN: CPT | Performed by: FAMILY MEDICINE

## 2019-04-10 RX ORDER — BENZONATATE 100 MG/1
100 CAPSULE ORAL 3 TIMES DAILY PRN
Qty: 30 CAP | Refills: 0 | Status: SHIPPED | OUTPATIENT
Start: 2019-04-10 | End: 2019-05-15

## 2019-04-10 ASSESSMENT — PATIENT HEALTH QUESTIONNAIRE - PHQ9: CLINICAL INTERPRETATION OF PHQ2 SCORE: 0

## 2019-04-10 NOTE — PROGRESS NOTES
Complaint: ER f/u.     Subjective:     Tarah Britton is a 38 y.o. female here today for f/u    Encounter for examination following treatment at hospital  Seen 4/1/19 at [facility] ER. Diagnosed with CAP, although CXR was neg. Placed on Omnicef,doxycycline, Cheratussin. Still coughing lots. Ran out of cough med which she had to pay for herself. SOB, using her nebs at least 2-3x/day.     I reviewed the ER summary and accompanying data.     Has lost 7 lbs since last visit. Going thru bariatric program.    Current medicines (including changes today)  Current Outpatient Prescriptions   Medication Sig Dispense Refill   • fluticasone-salmeterol (ADVAIR) 250-50 MCG/DOSE AEROSOL POWDER, BREATH ACTIVATED Inhale 1 Puff by mouth every 12 hours. 1 Inhaler 2   • benzonatate (TESSALON) 100 MG Cap Take 1 Cap by mouth 3 times a day as needed for Cough. 30 Cap 0   • Insulin Degludec (TRESIBA FLEXTOUCH) 200 UNIT/ML Solution Pen-injector Inject 50 Units as instructed every bedtime. 3 PEN 3   • lovastatin (MEVACOR) 40 MG tablet TAKE ONE TABLET BY MOUTH DAILY 90 Tab 1   • gabapentin (NEURONTIN) 300 MG Cap TAKE TWO CAPSULES BY MOUTH TWICE A  Cap 1   • pioglitazone (ACTOS) 30 MG Tab Take 1 Tab by mouth every day. 30 Tab 11   • losartan (COZAAR) 50 MG Tab Take 1 Tab by mouth every day. 30 Tab 5   • Dulaglutide (TRULICITY) 1.5 MG/0.5ML Solution Pen-injector Inject 0.5 mL as instructed every 7 days. 4 PEN 6   • Empagliflozin 10 MG Tab Take 1 tablet by mouth every morning with breakfast. 30 Tab 11   • levothyroxine (SYNTHROID) 75 MCG Tab Take 1 Tab by mouth Every morning on an empty stomach. 90 Tab 1   • metformin ER modified (GLUMETZA) 1000 MG TABLET SR 24 HR Take 1 tab twice a day 180 Tab 1   • Insulin Pen Needle 32G X 4 MM Misc 1 Each by Does not apply route 4 times a day. 120 Each 11   • albuterol (PROVENTIL) 2.5mg/3ml NEBU 3 mL by Nebulization route every four hours as needed for Shortness of Breath. 100 Bullet 3     No current  facility-administered medications for this visit.      She  has a past medical history of ASTHMA; Depressed; Diabetes; DM (diabetes mellitus) ([facility]); HTN (hypertension); Hyperlipidemia LDL goal <70; Morbid obesity with BMI of 50.0-59.9, adult ([facility]); Psychiatric disorder; and Sleep apnea.    Health Maintenance:       Allergies: Patient has no known allergies.    Current Outpatient Prescriptions Ordered in [facility]   Medication Sig Dispense Refill   • fluticasone-salmeterol (ADVAIR) 250-50 MCG/DOSE AEROSOL POWDER, BREATH ACTIVATED Inhale 1 Puff by mouth every 12 hours. 1 Inhaler 2   • benzonatate (TESSALON) 100 MG Cap Take 1 Cap by mouth 3 times a day as needed for Cough. 30 Cap 0   • Insulin Degludec (TRESIBA FLEXTOUCH) 200 UNIT/ML Solution Pen-injector Inject 50 Units as instructed every bedtime. 3 PEN 3   • lovastatin (MEVACOR) 40 MG tablet TAKE ONE TABLET BY MOUTH DAILY 90 Tab 1   • gabapentin (NEURONTIN) 300 MG Cap TAKE TWO CAPSULES BY MOUTH TWICE A  Cap 1   • pioglitazone (ACTOS) 30 MG Tab Take 1 Tab by mouth every day. 30 Tab 11   • losartan (COZAAR) 50 MG Tab Take 1 Tab by mouth every day. 30 Tab 5   • Dulaglutide (TRULICITY) 1.5 MG/0.5ML Solution Pen-injector Inject 0.5 mL as instructed every 7 days. 4 PEN 6   • Empagliflozin 10 MG Tab Take 1 tablet by mouth every morning with breakfast. 30 Tab 11   • levothyroxine (SYNTHROID) 75 MCG Tab Take 1 Tab by mouth Every morning on an empty stomach. 90 Tab 1   • metformin ER modified (GLUMETZA) 1000 MG TABLET SR 24 HR Take 1 tab twice a day 180 Tab 1   • Insulin Pen Needle 32G X 4 MM Misc 1 Each by Does not apply route 4 times a day. 120 Each 11   • albuterol (PROVENTIL) 2.5mg/3ml NEBU 3 mL by Nebulization route every four hours as needed for Shortness of Breath. 100 Bullet 3     No current [facility]-ordered facility-administered medications on file.        Past Medical History:   Diagnosis Date   • ASTHMA    • Depressed    • Diabetes    • DM (diabetes mellitus) ([facility])   "  • HTN (hypertension)    • Hyperlipidemia LDL goal <70    • Morbid obesity with BMI of 50.0-59.9, adult (HCC)    • Psychiatric disorder     depressed   • Sleep apnea        Past Surgical History:   Procedure Laterality Date   • ABDOMINAL HYSTERECTOMY TOTAL     • APPENDECTOMY     • CHOLECYSTECTOMY     • CLUB FOOT RELEASE         Social History   Substance Use Topics   • Smoking status: Never Smoker   • Smokeless tobacco: Never Used   • Alcohol use No       Social History     Social History Narrative    ** Merged History Encounter **            Family History   Problem Relation Age of Onset   • Heart Disease Sister          ROS POSITIVE FOR PRODUCTIVE COUGH, shortness of breath/wheezing.  Patient denies any fever, chills, unintentional weight gain/loss, fatigue, stroke symptoms, dizziness, headache, nasal congestion, sore-throat, heartburn, chest pain,  abdominal discomfort, diarrhea/constipation, burning with urination or frequency, joint or back pain, skin rashes, depression or anxiety.       Objective:     /78 (BP Location: Left arm, Patient Position: Sitting, BP Cuff Size: Large adult)   Pulse (!) 104   Temp 37.3 °C (99.2 °F)   Resp 16   Ht 1.626 m (5' 4\")   Wt (!) 135.4 kg (298 lb 8 oz)   SpO2 94%  Body mass index is 51.24 kg/m².   Physical Exam:  Constitutional: Alert, no distress. Junky cough.   Respiratory: Unlabored respiratory effort, lungs clear to auscultation, no wheezes, no ronchi.  Cardiovascular: Normal S1, S2, no murmur, no extremity edema.  Psych: Alert and oriented x3, appropriate affect and mood.        Assessment and Plan:   The following treatment plan was discussed    1. Moderate persistent asthma with acute exacerbation  Will add long-acting inhaler. Continue nebs as needed.  - fluticasone-salmeterol (ADVAIR) 250-50 MCG/DOSE AEROSOL POWDER, BREATH ACTIVATED; Inhale 1 Puff by mouth every 12 hours.  Dispense: 1 Inhaler; Refill: 2    2. Pneumonia due to infectious organism, " unspecified laterality, unspecified part of lung  Finish oral AB course. Will give cough suppressant.  - benzonatate (TESSALON) 100 MG Cap; Take 1 Cap by mouth 3 times a day as needed for Cough.  Dispense: 30 Cap; Refill: 0      Followup: Return if symptoms worsen or fail to improve.    Please note that this dictation was created using voice recognition software. I have made every reasonable attempt to correct obvious errors, but I expect that there are errors of grammar and possibly content that I did not discover before finalizing the note.

## 2019-04-10 NOTE — ASSESSMENT & PLAN NOTE
Seen 4/1/19 at [facility] ER. Diagnosed with CAP, although CXR was neg. Placed on Omnicef,doxycycline, Cheratussin. Still coughing lots. Ran out of cough med which she had to pay for herself. SOB, using her nebs at least 2-3x/day.

## 2019-04-25 ENCOUNTER — PATIENT MESSAGE (OUTPATIENT)
Dept: MEDICAL GROUP | Facility: CLINIC | Age: 39
End: 2019-04-25

## 2019-04-25 NOTE — PATIENT COMMUNICATION
Was the patient seen in the last year in this department? Yes 02/20/19     Does patient have an active prescription for medications requested? No     Received Request Via: Patient     Dulaglutide (TRULICITY) 1.5 MG/0.5ML Solution Pen-injector   Sig - Route: Inject 0.5 mL as instructed every 7 days

## 2019-04-25 NOTE — TELEPHONE ENCOUNTER
From: Tarah Britton  To: Vicente Acosta P.A.-C.  Sent: 4/25/2019 7:12 AM PDT  Subject: Prescription Question    I need to get my trulicity refill

## 2019-05-10 RX ORDER — METFORMIN HYDROCHLORIDE 1000 MG/1
TABLET, FILM COATED, EXTENDED RELEASE ORAL
Qty: 180 TAB | Refills: 1 | Status: SHIPPED | OUTPATIENT
Start: 2019-05-10 | End: 2019-05-15 | Stop reason: SDUPTHER

## 2019-05-10 NOTE — TELEPHONE ENCOUNTER
Was the patient seen in the last year in this department? Yes    Does patient have an active prescription for medications requested? Yes    Received Request Via: Pharmacy    Office Visit on 02/20/2019   Component Date Value   • Glycohemoglobin 02/20/2019 8.4    • Internal Control Negative 02/20/2019 Negative    • Internal Control Positive 02/20/2019 Positive    Office Visit on 11/21/2018   Component Date Value   • Glycohemoglobin 11/21/2018 9.4    • Internal Control Negative 11/21/2018 Negative    • Internal Control Positive 11/21/2018 Positive    Hospital Outpatient Visit on 08/22/2018   Component Date Value   • Creatinine, Urine 08/22/2018 146.40    • Microalbumin, Urine Rand* 08/22/2018 101.9    • Micro Alb Creat Ratio 08/22/2018 696*   Office Visit on 08/22/2018   Component Date Value   • Glycohemoglobin 08/22/2018 11.6    • Internal Control Negative 08/22/2018 Negative    • Internal Control Positive 08/22/2018 Positive    • RETINAL SCREEN 08/22/2018 Positive*   Abstract on 05/18/2018   Component Date Value   • Glycohemoglobin 05/18/2018 10    ]

## 2019-05-15 ENCOUNTER — OFFICE VISIT (OUTPATIENT)
Dept: MEDICAL GROUP | Facility: CLINIC | Age: 39
End: 2019-05-15
Payer: MEDICARE

## 2019-05-15 VITALS
TEMPERATURE: 98.7 F | WEIGHT: 293 LBS | RESPIRATION RATE: 16 BRPM | BODY MASS INDEX: 50.02 KG/M2 | SYSTOLIC BLOOD PRESSURE: 128 MMHG | OXYGEN SATURATION: 96 % | HEIGHT: 64 IN | HEART RATE: 88 BPM | DIASTOLIC BLOOD PRESSURE: 62 MMHG

## 2019-05-15 DIAGNOSIS — I10 ESSENTIAL HYPERTENSION: ICD-10-CM

## 2019-05-15 LAB
HBA1C MFR BLD: 8.1 % (ref 0–5.6)
INT CON NEG: NEGATIVE
INT CON POS: POSITIVE

## 2019-05-15 PROCEDURE — 83036 HEMOGLOBIN GLYCOSYLATED A1C: CPT | Performed by: PHYSICIAN ASSISTANT

## 2019-05-15 PROCEDURE — 99214 OFFICE O/P EST MOD 30 MIN: CPT | Performed by: PHYSICIAN ASSISTANT

## 2019-05-15 RX ORDER — PIOGLITAZONEHYDROCHLORIDE 30 MG/1
30 TABLET ORAL DAILY
Qty: 30 TAB | Refills: 11 | Status: SHIPPED | OUTPATIENT
Start: 2019-05-15 | End: 2020-06-08

## 2019-05-15 RX ORDER — METFORMIN HYDROCHLORIDE 1000 MG/1
TABLET, FILM COATED, EXTENDED RELEASE ORAL
Qty: 180 TAB | Refills: 1 | Status: SHIPPED | OUTPATIENT
Start: 2019-05-15 | End: 2020-01-23
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
## 2019-05-21 ENCOUNTER — TELEPHONE (OUTPATIENT)
Dept: ENDOCRINOLOGY | Facility: MEDICAL CENTER | Age: 39
End: 2019-05-21

## 2019-05-21 NOTE — TELEPHONE ENCOUNTER
Phone Number Called: [phone] (home)     Call outcome: spoke to patient regarding message below    Message: Regarding Mychart message, pt has been scheduled with Dr. Gore and will let us know if there is anything else she needs

## 2019-05-21 NOTE — TELEPHONE ENCOUNTER
----- Message from Tarah Britton sent at 5/20/2019 10:47 PM PDT -----  Regarding: Non-Urgent Medical Question  Contact: [phone]  I have a whole on the inside of my toe and I need to know what to do because there is no blood at all and it looks like I need to be seen on Wednesday morning ok and I would like to get a call from you asap.

## 2019-05-22 ENCOUNTER — OFFICE VISIT (OUTPATIENT)
Dept: MEDICAL GROUP | Facility: CLINIC | Age: 39
End: 2019-05-22
Payer: MEDICARE

## 2019-05-22 VITALS
HEIGHT: 64 IN | BODY MASS INDEX: 50.02 KG/M2 | DIASTOLIC BLOOD PRESSURE: 72 MMHG | RESPIRATION RATE: 14 BRPM | TEMPERATURE: 98 F | HEART RATE: 102 BPM | OXYGEN SATURATION: 95 % | SYSTOLIC BLOOD PRESSURE: 126 MMHG | WEIGHT: 293 LBS

## 2019-05-22 DIAGNOSIS — E66.01 MORBID OBESITY WITH BMI OF 50.0-59.9, ADULT (HCC): ICD-10-CM

## 2019-05-22 DIAGNOSIS — B35.3 TINEA PEDIS OF RIGHT FOOT: ICD-10-CM

## 2019-05-22 DIAGNOSIS — E03.9 ACQUIRED HYPOTHYROIDISM: ICD-10-CM

## 2019-05-22 PROBLEM — L08.9 INFECTION, SKIN: Status: ACTIVE | Noted: 2019-05-22

## 2019-05-22 PROBLEM — J18.9 PNEUMONIA DUE TO INFECTIOUS ORGANISM: Status: RESOLVED | Noted: 2019-04-10 | Resolved: 2019-05-22

## 2019-05-22 PROBLEM — Z09 ENCOUNTER FOR EXAMINATION FOLLOWING TREATMENT AT HOSPITAL: Status: RESOLVED | Noted: 2019-04-10 | Resolved: 2019-05-22

## 2019-05-22 PROCEDURE — 99214 OFFICE O/P EST MOD 30 MIN: CPT | Performed by: FAMILY MEDICINE

## 2019-05-22 RX ORDER — NYSTATIN 100000 U/G
CREAM TOPICAL
Qty: 1 TUBE | Refills: 2 | Status: SHIPPED
Start: 2019-05-22 | End: 2020-03-04

## 2019-05-22 NOTE — ASSESSMENT & PLAN NOTE
Has not yet seen psych yet, needs clearance. Has not organized the consult herself yet. Also scheduled to see dietitian again. Surgery probably in August.    Definitely not losing any weight on her own.

## 2019-05-22 NOTE — PROGRESS NOTES
Complaint: Hole in foot     Subjective:     Tarah Britton is a 38 y.o. female here today accompanied by her boyfriend.    Infection, skin  Says she has a hole in skin between small toe and 4th toe right foot. Does not hurt.    Morbid obesity with BMI of 50.0-59.9, adult ([facility])  Has not yet seen psych yet, needs clearance. Has not organized the consult herself yet. Also scheduled to see dietitian again. Surgery probably in August.    Definitely not losing any weight on her own.    Uncontrolled type 2 diabetes mellitus with neurologic complication, without long-term current use of insulin ([facility])  Followed by justino Gould. Latest A1c 8.1 %, coming down.    Acquired hypothyroidism  No recent TSH check. Currently on 75 mch Synthroid/day.     No other concerns or complaints today.    Current medicines (including changes today)  Current Outpatient Prescriptions   Medication Sig Dispense Refill   • nystatin (MYCOSTATIN) 442243 UNIT/GM Cream topical cream Apply to affected areas twice a day 1 Tube 2   • Dulaglutide (TRULICITY) 1.5 MG/0.5ML Solution Pen-injector Inject 0.5 mL as instructed every 7 days. 4 PEN 6   • Empagliflozin 10 MG Tab Take 1 tablet by mouth every morning with breakfast. 30 Tab 11   • pioglitazone (ACTOS) 30 MG Tab Take 1 Tab by mouth every day. 30 Tab 11   • metformin ER modified (GLUMETZA) 1000 MG TABLET SR 24 HR Take 1 tab twice a day 180 Tab 1   • Insulin Degludec (TRESIBA FLEXTOUCH) 200 UNIT/ML Solution Pen-injector Inject 60 Units as instructed every bedtime. 3 PEN 3   • fluticasone-salmeterol (ADVAIR) 250-50 MCG/DOSE AEROSOL POWDER, BREATH ACTIVATED Inhale 1 Puff by mouth every 12 hours. 1 Inhaler 2   • lovastatin (MEVACOR) 40 MG tablet TAKE ONE TABLET BY MOUTH DAILY 90 Tab 1   • gabapentin (NEURONTIN) 300 MG Cap TAKE TWO CAPSULES BY MOUTH TWICE A  Cap 1   • losartan (COZAAR) 50 MG Tab Take 1 Tab by mouth every day. 30 Tab 5   • levothyroxine (SYNTHROID) 75 MCG Tab Take 1 Tab by mouth  Every morning on an empty stomach. 90 Tab 1   • Insulin Pen Needle 32G X 4 MM Misc 1 Each by Does not apply route 4 times a day. 120 Each 11   • albuterol (PROVENTIL) 2.5mg/3ml NEBU 3 mL by Nebulization route every four hours as needed for Shortness of Breath. 100 Bullet 3     No current facility-administered medications for this visit.      She  has a past medical history of ASTHMA; Depressed; Diabetes; DM (diabetes mellitus) ([facility]); HTN (hypertension); Hyperlipidemia LDL goal <70; Morbid obesity with BMI of 50.0-59.9, adult ([facility]); Psychiatric disorder; and Sleep apnea.    Health Maintenance: needs labs      Allergies: Patient has no known allergies.    Current Outpatient Prescriptions Ordered in [facility]   Medication Sig Dispense Refill   • nystatin (MYCOSTATIN) 894617 UNIT/GM Cream topical cream Apply to affected areas twice a day 1 Tube 2   • Dulaglutide (TRULICITY) 1.5 MG/0.5ML Solution Pen-injector Inject 0.5 mL as instructed every 7 days. 4 PEN 6   • Empagliflozin 10 MG Tab Take 1 tablet by mouth every morning with breakfast. 30 Tab 11   • pioglitazone (ACTOS) 30 MG Tab Take 1 Tab by mouth every day. 30 Tab 11   • metformin ER modified (GLUMETZA) 1000 MG TABLET SR 24 HR Take 1 tab twice a day 180 Tab 1   • Insulin Degludec (TRESIBA FLEXTOUCH) 200 UNIT/ML Solution Pen-injector Inject 60 Units as instructed every bedtime. 3 PEN 3   • fluticasone-salmeterol (ADVAIR) 250-50 MCG/DOSE AEROSOL POWDER, BREATH ACTIVATED Inhale 1 Puff by mouth every 12 hours. 1 Inhaler 2   • lovastatin (MEVACOR) 40 MG tablet TAKE ONE TABLET BY MOUTH DAILY 90 Tab 1   • gabapentin (NEURONTIN) 300 MG Cap TAKE TWO CAPSULES BY MOUTH TWICE A  Cap 1   • losartan (COZAAR) 50 MG Tab Take 1 Tab by mouth every day. 30 Tab 5   • levothyroxine (SYNTHROID) 75 MCG Tab Take 1 Tab by mouth Every morning on an empty stomach. 90 Tab 1   • Insulin Pen Needle 32G X 4 MM Misc 1 Each by Does not apply route 4 times a day. 120 Each 11   • albuterol  "(PROVENTIL) 2.5mg/3ml NEBU 3 mL by Nebulization route every four hours as needed for Shortness of Breath. 100 Bullet 3     No current [facility]-ordered facility-administered medications on file.        Past Medical History:   Diagnosis Date   • ASTHMA    • Depressed    • Diabetes    • DM (diabetes mellitus) ([facility])    • HTN (hypertension)    • Hyperlipidemia LDL goal <70    • Morbid obesity with BMI of 50.0-59.9, adult ([facility])    • Psychiatric disorder     depressed   • Sleep apnea        Past Surgical History:   Procedure Laterality Date   • ABDOMINAL HYSTERECTOMY TOTAL     • APPENDECTOMY     • CHOLECYSTECTOMY     • CLUB FOOT RELEASE         Social History   Substance Use Topics   • Smoking status: Never Smoker   • Smokeless tobacco: Never Used   • Alcohol use No       Social History     Social History Narrative    ** Merged History Encounter **            Family History   Problem Relation Age of Onset   • Heart Disease Sister          ROS Positive for hole between toes on right foot.  Patient denies any fever, chills, unintentional weight gain/loss, fatigue, stroke symptoms, dizziness, headache, nasal congestion, sore-throat, cough, heartburn, chest pain, difficulty breathing, abdominal discomfort, diarrhea/constipation, burning with urination or frequency, joint or back pain, skin rashes, depression or anxiety.       Objective:     /72 (BP Location: Left arm, Patient Position: Sitting, BP Cuff Size: Adult)   Pulse (!) 102   Temp 36.7 °C (98 °F) (Temporal)   Resp 14   Ht 1.626 m (5' 4\")   Wt (!) 135.2 kg (298 lb)   SpO2 95%  Body mass index is 51.15 kg/m².   Physical Exam:  Constitutional: Morbidly obese. Alert, no distress.  Skin: Warm, dry, good turgor, no rashes in visible areas. Maceration, ulceration with white creamy d/c in webbing between digs 4 & 5 right foot. Minimal inflammation around it.  Psych: Alert and oriented x3, appropriate affect and mood.        Assessment and Plan:   The following treatment " plan was discussed    1. Tinea pedis of right foot  Needs to wear open shoes o let air get to her feet. Place gauze between toes for same reason. Was feet and change socks daily.  - nystatin (MYCOSTATIN) 103503 UNIT/GM Cream topical cream; Apply to affected areas twice a day  Dispense: 1 Tube; Refill: 2    2. Uncontrolled type 2 diabetes mellitus with neurologic complication, without long-term current use of insulin ([facility])  Improved controlled. Will notify with lab test results. F/u with Vicente as scheduled.  - CBC WITH DIFFERENTIAL; Future  - Comp Metabolic Panel; Future  - Lipid Profile; Future    3. Acquired hypothyroidism  Will notify with result, titrate to keep TSH between 1 and 2.  - TSH WITH REFLEX TO FT4; Future    4. Morbid obesity with BMI of 50.0-59.9, adult ([facility])  Given tel # [employer] [facility] to set up consult with their psych.      Followup: Return if symptoms worsen or fail to improve.    Please note that this dictation was created using voice recognition software. I have made every reasonable attempt to correct obvious errors, but I expect that there are errors of grammar and possibly content that I did not discover before finalizing the note.

## 2019-05-31 ENCOUNTER — OFFICE VISIT (OUTPATIENT)
Dept: BEHAVIORAL HEALTH | Facility: CLINIC | Age: 39
End: 2019-05-31
Payer: MEDICARE

## 2019-05-31 DIAGNOSIS — F41.1 GENERALIZED ANXIETY DISORDER: ICD-10-CM

## 2019-05-31 DIAGNOSIS — F71 MODERATE INTELLECTUAL DISABILITIES: ICD-10-CM

## 2019-05-31 DIAGNOSIS — E66.01 MORBID OBESITY WITH BMI OF 50.0-59.9, ADULT (HCC): ICD-10-CM

## 2019-05-31 DIAGNOSIS — Z71.89 PRE-BARIATRIC SURGERY PSYCHOLOGICAL EVALUATION: ICD-10-CM

## 2019-05-31 DIAGNOSIS — F81.9 LEARNING DISABILITIES: ICD-10-CM

## 2019-05-31 DIAGNOSIS — F32.A DEPRESSION, UNSPECIFIED DEPRESSION TYPE: ICD-10-CM

## 2019-05-31 PROCEDURE — 90791 PSYCH DIAGNOSTIC EVALUATION: CPT | Performed by: PSYCHOLOGIST
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
## 2019-06-11 PROBLEM — F81.9 LEARNING DISABILITIES: Status: ACTIVE | Noted: 2019-06-11

## 2019-06-11 PROBLEM — F71 MODERATE INTELLECTUAL DISABILITIES: Status: ACTIVE | Noted: 2018-05-17

## 2019-06-11 PROBLEM — F41.1 GENERALIZED ANXIETY DISORDER: Status: ACTIVE | Noted: 2019-06-11

## 2019-06-19 ENCOUNTER — PATIENT MESSAGE (OUTPATIENT)
Dept: MEDICAL GROUP | Facility: CLINIC | Age: 39
End: 2019-06-19

## 2019-07-10 ENCOUNTER — DOCUMENTATION (OUTPATIENT)
Dept: BEHAVIORAL HEALTH | Facility: CLINIC | Age: 39
End: 2019-07-10

## 2019-08-21 ENCOUNTER — OFFICE VISIT (OUTPATIENT)
Dept: MEDICAL GROUP | Facility: CLINIC | Age: 39
End: 2019-08-21
Payer: MEDICARE

## 2019-08-21 VITALS
TEMPERATURE: 97.1 F | DIASTOLIC BLOOD PRESSURE: 72 MMHG | SYSTOLIC BLOOD PRESSURE: 124 MMHG | HEART RATE: 90 BPM | BODY MASS INDEX: 50.02 KG/M2 | OXYGEN SATURATION: 95 % | WEIGHT: 293 LBS | HEIGHT: 64 IN

## 2019-08-21 DIAGNOSIS — I10 ESSENTIAL HYPERTENSION: ICD-10-CM

## 2019-08-21 DIAGNOSIS — E11.8 TYPE 2 DIABETES MELLITUS WITH COMPLICATION, UNSPECIFIED WHETHER LONG TERM INSULIN USE: ICD-10-CM

## 2019-08-21 LAB
HBA1C MFR BLD: 8.6 % (ref 0–5.6)
INT CON NEG: ABNORMAL
INT CON POS: ABNORMAL

## 2019-08-21 PROCEDURE — 99214 OFFICE O/P EST MOD 30 MIN: CPT | Performed by: PHYSICIAN ASSISTANT

## 2019-08-21 PROCEDURE — 83036 HEMOGLOBIN GLYCOSYLATED A1C: CPT | Performed by: PHYSICIAN ASSISTANT

## 2019-08-21 NOTE — PATIENT INSTRUCTIONS
Blood glucose log: Check BG in the morning when wake up,  and before bed.  So two times a day.  Always bring BG diary to the next office visit.     Now on:  1.  Glumetza 1000 BID  2.  Tresiba at night 50 units  3.  Jardiance 10mg one a day  4.  Trulicity 1.5 once a week  (Friday)  5.  Actos 30mg one a day  6.  Novolog 10 units with lunch
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
## 2019-09-18 ENCOUNTER — OFFICE VISIT (OUTPATIENT)
Dept: MEDICAL GROUP | Facility: CLINIC | Age: 39
End: 2019-09-18
Payer: MEDICARE

## 2019-09-18 VITALS
HEART RATE: 85 BPM | HEIGHT: 64 IN | TEMPERATURE: 97.7 F | WEIGHT: 293 LBS | DIASTOLIC BLOOD PRESSURE: 70 MMHG | BODY MASS INDEX: 50.02 KG/M2 | OXYGEN SATURATION: 93 % | SYSTOLIC BLOOD PRESSURE: 120 MMHG

## 2019-09-18 DIAGNOSIS — I10 ESSENTIAL HYPERTENSION: ICD-10-CM

## 2019-09-18 PROCEDURE — 99214 OFFICE O/P EST MOD 30 MIN: CPT | Performed by: PHYSICIAN ASSISTANT

## 2019-09-18 NOTE — PATIENT INSTRUCTIONS
Now on:  1.  Glumetza 1000 BID  2.  Tresiba at night 50 units  3.  Jardiance 10mg one a day  4.  Trulicity 1.5 once a week  (Friday)  5.  Actos 30mg one a day  6.  Novolog 10 units with lunch  (not taking)
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
## 2019-09-23 DIAGNOSIS — E03.9 ACQUIRED HYPOTHYROIDISM: ICD-10-CM

## 2019-09-23 RX ORDER — LEVOTHYROXINE SODIUM 0.07 MG/1
TABLET ORAL
Qty: 90 TAB | Refills: 4 | Status: SHIPPED
Start: 2019-09-23 | End: 2020-03-04

## 2019-09-23 NOTE — TELEPHONE ENCOUNTER
Was the patient seen in the last year in this department? Yes    Does patient have an active prescription for medications requested? Yes    Received Request Via: Pharmacy     levothyroxine (SYNTHROID) 75 MCG Tab 90 Tab 1/1 9/20/2018    Sig - Route: Take 1 Tab by mouth Every morning on an empty stomach. - Oral

## 2019-11-20 ENCOUNTER — OFFICE VISIT (OUTPATIENT)
Dept: MEDICAL GROUP | Facility: CLINIC | Age: 39
End: 2019-11-20
Payer: MEDICARE

## 2019-11-20 VITALS
HEIGHT: 64 IN | RESPIRATION RATE: 16 BRPM | WEIGHT: 293 LBS | HEART RATE: 81 BPM | DIASTOLIC BLOOD PRESSURE: 78 MMHG | SYSTOLIC BLOOD PRESSURE: 122 MMHG | OXYGEN SATURATION: 93 % | BODY MASS INDEX: 50.02 KG/M2 | TEMPERATURE: 98.3 F

## 2019-11-20 DIAGNOSIS — I10 ESSENTIAL HYPERTENSION: ICD-10-CM

## 2019-11-20 LAB
HBA1C MFR BLD: 8 % (ref 0–5.6)
INT CON NEG: NEGATIVE
INT CON POS: POSITIVE

## 2019-11-20 PROCEDURE — 83036 HEMOGLOBIN GLYCOSYLATED A1C: CPT | Performed by: PHYSICIAN ASSISTANT

## 2019-11-20 PROCEDURE — 99214 OFFICE O/P EST MOD 30 MIN: CPT | Performed by: PHYSICIAN ASSISTANT

## 2019-11-20 ASSESSMENT — PAIN SCALES - GENERAL: PAINLEVEL: NO PAIN
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
## 2019-11-20 NOTE — PATIENT INSTRUCTIONS
Now on:  1.  Glumetza 1000 BID  2.  Tresiba at night 50 units  3.  Jardiance 10mg one a day  4.  Trulicity 1.5 once a week  (Friday)  5.  Actos 30mg one a day  6.  Novolog 12 units with lunch and dinner

## 2019-11-22 ENCOUNTER — TELEPHONE (OUTPATIENT)
Dept: MEDICAL GROUP | Facility: CLINIC | Age: 39
End: 2019-11-22

## 2019-11-22 NOTE — TELEPHONE ENCOUNTER
Phone Number Called: [phone] (home)     Call outcome: spoke to patient regarding message below    Message: Spoke to patient regarding [employer] fax we received for supplies. Advised patient that I tried to contact company to see what information was needed for paperwork and as I was giving them the my name and information about where I was calling from the  seemed off which alerted me to just disconnect call. I let the  know I was going to contact patient and would reach back out to them. There was strange music and loud talking in the background which did not seem right for a office setting. I called patient and she states that she received a call from the [employer] and they told her she can get the supplies. I asked patient if she aware that they were located in [place] and she was unaware. I also do not know how much information patient provided to the company over the phone. I advised patient that I will talk with PCP in the morning regarding the paperwork and we will reach out to her with his response. I will scan paperwork into patient's  for reference. Thank you.

## 2019-11-25 RX ORDER — INSULIN DEGLUDEC 200 U/ML
INJECTION, SOLUTION SUBCUTANEOUS
Qty: 3 PEN | Refills: 2 | Status: SHIPPED | OUTPATIENT
Start: 2019-11-25 | End: 2020-04-16

## 2019-12-09 DIAGNOSIS — E66.01 MORBID OBESITY WITH BMI OF 50.0-59.9, ADULT (HCC): ICD-10-CM

## 2020-01-22 RX ORDER — GABAPENTIN 300 MG/1
CAPSULE ORAL
Qty: 360 CAP | Refills: 0 | Status: SHIPPED | OUTPATIENT
Start: 2020-01-22 | End: 2020-04-17

## 2020-01-22 NOTE — TELEPHONE ENCOUNTER
Was the patient seen in the last year in this department? Yes    Does patient have an active prescription for medications requested? No     Received Request Via: Pharmacy  
General

## 2020-01-24 ENCOUNTER — TELEPHONE (OUTPATIENT)
Dept: MEDICAL GROUP | Facility: PHYSICIAN GROUP | Age: 40
End: 2020-01-24

## 2020-01-24 NOTE — TELEPHONE ENCOUNTER
A representative from an [employer] company called, did not leave her name, and stated that this patient needed a prescription filled out and faxed for CPAP supplies and a full face mask.     Please fax back the script to: [phone]

## 2020-02-04 ENCOUNTER — TELEPHONE (OUTPATIENT)
Dept: MEDICAL GROUP | Facility: PHYSICIAN GROUP | Age: 40
End: 2020-02-04

## 2020-02-04 NOTE — TELEPHONE ENCOUNTER
Called and LVM regarding CPAP supplies. Per Dr. Gore Patient needs appointment in order for us to fax in notes to supply company.

## 2020-02-10 ENCOUNTER — TELEPHONE (OUTPATIENT)
Dept: MEDICAL GROUP | Facility: PHYSICIAN GROUP | Age: 40
End: 2020-02-10

## 2020-02-10 NOTE — TELEPHONE ENCOUNTER
CPAP supply company is calling regarding the order for this patients supplies. They state that they are needing their form filled out to help with this request. Due to notes, I see she is needing this to be done by pulmonary? I do not see any information on that. Please advise on who needs to complete this.    P) [phone]  F) [phone]

## 2020-03-04 ENCOUNTER — OFFICE VISIT (OUTPATIENT)
Dept: MEDICAL GROUP | Facility: CLINIC | Age: 40
End: 2020-03-04
Payer: MEDICARE

## 2020-03-04 VITALS
SYSTOLIC BLOOD PRESSURE: 122 MMHG | TEMPERATURE: 97.4 F | DIASTOLIC BLOOD PRESSURE: 70 MMHG | OXYGEN SATURATION: 100 % | BODY MASS INDEX: 50.02 KG/M2 | HEART RATE: 84 BPM | HEIGHT: 64 IN | WEIGHT: 293 LBS

## 2020-03-04 DIAGNOSIS — Z09 ENCOUNTER FOR EXAMINATION FOLLOWING TREATMENT AT HOSPITAL: ICD-10-CM

## 2020-03-04 DIAGNOSIS — E66.01 MORBID OBESITY WITH BMI OF 50.0-59.9, ADULT (HCC): ICD-10-CM

## 2020-03-04 DIAGNOSIS — Z12.31 ENCOUNTER FOR SCREENING MAMMOGRAM FOR BREAST CANCER: ICD-10-CM

## 2020-03-04 DIAGNOSIS — L30.4 INTERTRIGO: ICD-10-CM

## 2020-03-04 DIAGNOSIS — E03.9 ACQUIRED HYPOTHYROIDISM: ICD-10-CM

## 2020-03-04 PROBLEM — L08.9 INFECTION, SKIN: Status: RESOLVED | Noted: 2019-05-22 | Resolved: 2020-03-04

## 2020-03-04 LAB
HBA1C MFR BLD: 8.6 % (ref 0–5.6)
INT CON NEG: ABNORMAL
INT CON POS: ABNORMAL

## 2020-03-04 PROCEDURE — 83036 HEMOGLOBIN GLYCOSYLATED A1C: CPT | Performed by: FAMILY MEDICINE

## 2020-03-04 PROCEDURE — 99214 OFFICE O/P EST MOD 30 MIN: CPT | Performed by: FAMILY MEDICINE

## 2020-03-04 RX ORDER — NYSTATIN 100000 [USP'U]/G
POWDER TOPICAL
Qty: 15 G | Refills: 2 | Status: SHIPPED | OUTPATIENT
Start: 2020-03-04 | End: 2020-03-25 | Stop reason: SDUPTHER

## 2020-03-04 ASSESSMENT — PATIENT HEALTH QUESTIONNAIRE - PHQ9
2. FEELING DOWN, DEPRESSED, IRRITABLE, OR HOPELESS: NOT AT ALL
1. LITTLE INTEREST OR PLEASURE IN DOING THINGS: NOT AT ALL
SUM OF ALL RESPONSES TO PHQ9 QUESTIONS 1 AND 2: 0

## 2020-03-05 NOTE — ASSESSMENT & PLAN NOTE
Currently on metformin, Tresiba, Novolog, Actos, Trulicity and Jardiance. Still having rashes under breasts and in skin creases.

## 2020-03-19 ENCOUNTER — TELEMEDICINE2 (OUTPATIENT)
Dept: SCHEDULING | Facility: IMAGING CENTER | Age: 40
End: 2020-03-19
Payer: MEDICARE

## 2020-03-26 ENCOUNTER — TELEPHONE (OUTPATIENT)
Dept: URGENT CARE | Facility: CLINIC | Age: 40
End: 2020-03-26

## 2020-03-27 NOTE — TELEPHONE ENCOUNTER
Per Dr. Sparrow, I called the patient to find out if she had yet seen her pulmonologist. She stated her appointment has been rescheduled to May. I asked her to please reach out to this company and explain to them that they need to get this information from the pulmonologist. We have told the company this several times. The info needed to contact the company was given to her and she stated she would be calling them.

## 2020-03-29 ENCOUNTER — TELEPHONE (OUTPATIENT)
Dept: URGENT CARE | Facility: CLINIC | Age: 40
End: 2020-03-29

## 2020-03-29 NOTE — TELEPHONE ENCOUNTER
VOICEMAIL:    Tiff calling about a request they faxed in regards to CPAP mask supplies. Please fax back forms to [phone].

## 2020-04-02 ENCOUNTER — TELEPHONE (OUTPATIENT)
Dept: MEDICAL GROUP | Facility: PHYSICIAN GROUP | Age: 40
End: 2020-04-02

## 2020-04-02 NOTE — TELEPHONE ENCOUNTER
[facility] called regarding a faxed over a CPAP script that needs clarification and recent chart notes.   Chart Notes and Clarification can be fax to: [phone] ref#[id number]      Or [facility] can be reached via phone at: [phone]

## 2020-04-03 NOTE — TELEPHONE ENCOUNTER
Phone Number Called: [phone] (home)     Call outcome: Spoke to patient regarding message below.    Message: Pt was notified to contact her Pulmonologist.

## 2020-04-14 ENCOUNTER — TELEPHONE (OUTPATIENT)
Dept: MEDICAL GROUP | Facility: PHYSICIAN GROUP | Age: 40
End: 2020-04-14

## 2020-04-14 NOTE — TELEPHONE ENCOUNTER
[facility] called regarding the cpap prescription and they need clarification on a few things along with chart notes. They want to speak to Dr. Gore so will call Basilio Cardoso 4/15/20

## 2020-04-20 ENCOUNTER — TELEPHONE (OUTPATIENT)
Dept: URGENT CARE | Facility: CLINIC | Age: 40
End: 2020-04-20

## 2020-04-21 NOTE — TELEPHONE ENCOUNTER
VOICEMAIL:    Mery from [facility] is calling to say the Dr. Gore already authorized the order for this patient. They are going to contact Pulmonology. If you have any questions you can give them a call.

## 2020-04-22 ENCOUNTER — TELEPHONE (OUTPATIENT)
Dept: URGENT CARE | Facility: CLINIC | Age: 40
End: 2020-04-22

## 2020-04-29 ENCOUNTER — TELEPHONE (OUTPATIENT)
Dept: MEDICAL GROUP | Facility: PHYSICIAN GROUP | Age: 40
End: 2020-04-29

## 2020-04-29 NOTE — TELEPHONE ENCOUNTER
CPAP company called needing the chart notes stating the CPAP mask and the diagnosis for sleep apnea.     [phone]: Fax number

## 2020-05-13 ENCOUNTER — TELEPHONE (OUTPATIENT)
Dept: MEDICAL GROUP | Facility: PHYSICIAN GROUP | Age: 40
End: 2020-05-13

## 2020-05-13 NOTE — TELEPHONE ENCOUNTER
A rep with [employer] called requesting all clinical notes and supporting documentation for this patient's Sleep Apnea diagnoses.     The notes can be faxed back to [phone]

## 2020-05-13 NOTE — TELEPHONE ENCOUNTER
Previous encounter from 4/20/20 states [facility] was supposed to contact Pulmonologist for further info. There is no recent notes to support order. No number left to call back. Will wait for call back.

## 2020-05-22 ENCOUNTER — TELEPHONE (OUTPATIENT)
Dept: MEDICAL GROUP | Facility: PHYSICIAN GROUP | Age: 40
End: 2020-05-22

## 2020-05-22 ENCOUNTER — TELEPHONE (OUTPATIENT)
Dept: MEDICAL GROUP | Facility: CLINIC | Age: 40
End: 2020-05-22

## 2020-05-22 NOTE — TELEPHONE ENCOUNTER
Elaine from \[facility] called and stated they are in need of recent chart notes that states Pt was diagnosed with sleep apnea and currently on CPAP machine.

## 2020-05-22 NOTE — TELEPHONE ENCOUNTER
DOCUMENTATION OF PAR STATUS:    1. Name of Medication & Dose: Nystatin     2. Name of Prescription Coverage Company & phone #: Medicare    3. Date Prior Auth Submitted: 05/22/20    4. What information was given to obtain insurance decision? Clinical Notes    5. Prior Auth Status? Pending    6. Patient Notified: N\A

## 2020-05-26 DIAGNOSIS — L30.4 INTERTRIGO: ICD-10-CM

## 2020-05-26 RX ORDER — NYSTATIN 100000 [USP'U]/G
POWDER TOPICAL
Qty: 60 G | Refills: 2 | Status: SHIPPED | OUTPATIENT
Start: 2020-05-26 | End: 2020-06-21 | Stop reason: SDUPTHER

## 2020-05-26 NOTE — TELEPHONE ENCOUNTER
PA denied d/t amount requested is over the limit by your plan; amount requested 15g for 7 days. This prescription may only be filled for an amount of 60 grams for a 30 day supply.

## 2020-05-27 DIAGNOSIS — E03.9 ACQUIRED HYPOTHYROIDISM: ICD-10-CM

## 2020-06-05 ENCOUNTER — OFFICE VISIT (OUTPATIENT)
Dept: MEDICAL GROUP | Facility: CLINIC | Age: 40
End: 2020-06-05
Payer: MEDICARE

## 2020-06-05 VITALS
HEART RATE: 68 BPM | SYSTOLIC BLOOD PRESSURE: 120 MMHG | WEIGHT: 293 LBS | BODY MASS INDEX: 50.02 KG/M2 | RESPIRATION RATE: 16 BRPM | TEMPERATURE: 98.6 F | HEIGHT: 64 IN | OXYGEN SATURATION: 94 % | DIASTOLIC BLOOD PRESSURE: 78 MMHG

## 2020-06-05 DIAGNOSIS — E11.40 TYPE 2 DIABETES MELLITUS WITH DIABETIC NEUROPATHY, WITH LONG-TERM CURRENT USE OF INSULIN (HCC): ICD-10-CM

## 2020-06-05 DIAGNOSIS — E66.01 MORBID OBESITY WITH BMI OF 50.0-59.9, ADULT (HCC): ICD-10-CM

## 2020-06-05 DIAGNOSIS — E03.9 ACQUIRED HYPOTHYROIDISM: ICD-10-CM

## 2020-06-05 DIAGNOSIS — Z79.4 TYPE 2 DIABETES MELLITUS WITH DIABETIC NEUROPATHY, WITH LONG-TERM CURRENT USE OF INSULIN (HCC): ICD-10-CM

## 2020-06-05 DIAGNOSIS — G47.33 OSA ON CPAP: ICD-10-CM

## 2020-06-05 DIAGNOSIS — I10 ESSENTIAL HYPERTENSION: ICD-10-CM

## 2020-06-05 DIAGNOSIS — E78.2 MIXED HYPERLIPIDEMIA: ICD-10-CM

## 2020-06-05 PROBLEM — Z09 ENCOUNTER FOR EXAMINATION FOLLOWING TREATMENT AT HOSPITAL: Status: RESOLVED | Noted: 2019-04-10 | Resolved: 2020-06-05

## 2020-06-05 LAB
HBA1C MFR BLD: 8.4 % (ref 0–5.6)
INT CON NEG: ABNORMAL
INT CON POS: ABNORMAL

## 2020-06-05 PROCEDURE — 99214 OFFICE O/P EST MOD 30 MIN: CPT | Performed by: FAMILY MEDICINE

## 2020-06-05 PROCEDURE — 83036 HEMOGLOBIN GLYCOSYLATED A1C: CPT | Performed by: FAMILY MEDICINE

## 2020-06-05 RX ORDER — LEVOTHYROXINE SODIUM 0.1 MG/1
100 TABLET ORAL
Qty: 90 TAB | Refills: 1 | Status: SHIPPED | OUTPATIENT
Start: 2020-06-05 | End: 2020-11-03 | Stop reason: SDUPTHER

## 2020-06-05 RX ORDER — ATORVASTATIN CALCIUM 40 MG/1
40 TABLET, FILM COATED ORAL DAILY
Qty: 90 TAB | Refills: 0 | Status: SHIPPED | OUTPATIENT
Start: 2020-06-05 | End: 2020-09-03 | Stop reason: SDUPTHER

## 2020-06-05 NOTE — ASSESSMENT & PLAN NOTE
Non compliant with diet, not getting much exercise.    Currently on : Jardiance 25 mg, Trulicity 1.5 mg, Novolog, Tresiba, Actos 30 mg and metformin er 1g. Denies any low BS spells.

## 2020-06-05 NOTE — ASSESSMENT & PLAN NOTE
Never heard back from Dr. Enriquez's office,m due to Covid crisis. Willing to see Dr. Spring at [facility], now.

## 2020-06-05 NOTE — PROGRESS NOTES
Complaint: f/u DM, weight.     Subjective:     Tarah Britotn is a 40 y.o. female here today for f/u.    Type 2 diabetes mellitus, with long-term current use of insulin (HCC)  Non compliant with diet, not getting much exercise.    Currently on : Jardiance 25 mg, Trulicity 1.5 mg, Novolog, Tresiba, Actos 30 mg and metformin er 1g. Denies any low BS spells.       DESEAN on CPAP  Received her supplies, will f/u with pulm end of this month.    Morbid obesity with BMI of 50.0-59.9, adult (HCC)  Never heard back from Dr. Enriquez's office,m due to Covid crisis. Willing to see Dr. Spring at [facility] [place], now.    Mixed hyperlipidemia  Recent labs showed LDL at 114, HDL at 50. On lovastatin 40 mg.    HTN (hypertension)  Managed with Cozaar 50 mg qd.    Acquired hypothyroidism  Recent TSH 3.4. On Synthroid 75 mcg/day.     No other concerns or complaints.    Current medicines (including changes today)  Current Outpatient Medications   Medication Sig Dispense Refill   • atorvastatin (LIPITOR) 40 MG Tab Take 1 Tab by mouth every day. 90 Tab 0   • levothyroxine (SYNTHROID) 100 MCG Tab Take 1 Tab by mouth Every morning on an empty stomach. 90 Tab 1   • nystatin (MYCOSTATIN) powder Apply to beneath breasts and to skin folds twice a day 60 g 2   • gabapentin (NEURONTIN) 300 MG Cap TAKE TWO CAPSULES BY MOUTH TWICE A  Cap 0   • TRESIBA FLEXTOUCH 200 UNIT/ML Solution Pen-injector INJECT 60 UNITS SUB-Q AT BEDTIME 18 mL 1   • Empagliflozin 25 MG Tab Take 1 Tab by mouth every day. 30 Tab 5   • metFORMIN ER (GLUCOPHAGE XR) 500 MG TABLET SR 24 HR TAKE TWO TABLETS (1000MG) BY MOUTH TWICE A  Tab 0   • ADVAIR DISKUS 250-50 MCG/DOSE AEROSOL POWDER, BREATH ACTIVATED INHALE ONE PUFF BY MOUTH EVERY 12 HOURS 180 Inhaler 2   • losartan (COZAAR) 50 MG Tab TAKE ONE TABLET BY MOUTH DAILY 90 Tab 1   • Blood Glucose Test Strips Testing twice a day 60 Applicator 7   • Blood Glucose Monitoring Suppl Device Testing twice a day 1 Device 1   • insulin aspart  (NOVOLOG) 100 unit/ml injection PEN Inject 10 Units as instructed 3 times a day before meals. 5 PEN 5   • Dulaglutide (TRULICITY) 1.5 MG/0.5ML Solution Pen-injector Inject 0.5 mL as instructed every 7 days. 4 PEN 6   • pioglitazone (ACTOS) 30 MG Tab Take 1 Tab by mouth every day. 30 Tab 11   • Insulin Pen Needle 32G X 4 MM Misc 1 Each by Does not apply route 4 times a day. 120 Each 11   • albuterol (PROVENTIL) 2.5mg/3ml NEBU 3 mL by Nebulization route every four hours as needed for Shortness of Breath. 100 Bullet 3     No current facility-administered medications for this visit.      She  has a past medical history of Anxiety, ASTHMA, Depressed, Depression, Diabetes, DM (diabetes mellitus) ([facility]), HTN (hypertension), Hyperlipidemia LDL goal <70, Morbid obesity with BMI of 50.0-59.9, adult ([facility]), Psychiatric disorder, Sleep apnea, and Uncontrolled type 2 diabetes mellitus with neurologic complication, without long-term current use of insulin ([facility]).    Health Maintenance:      Allergies: Patient has no known allergies.    Current Outpatient Medications Ordered in Epic   Medication Sig Dispense Refill   • atorvastatin (LIPITOR) 40 MG Tab Take 1 Tab by mouth every day. 90 Tab 0   • levothyroxine (SYNTHROID) 100 MCG Tab Take 1 Tab by mouth Every morning on an empty stomach. 90 Tab 1   • nystatin (MYCOSTATIN) powder Apply to beneath breasts and to skin folds twice a day 60 g 2   • gabapentin (NEURONTIN) 300 MG Cap TAKE TWO CAPSULES BY MOUTH TWICE A  Cap 0   • TRESIBA FLEXTOUCH 200 UNIT/ML Solution Pen-injector INJECT 60 UNITS SUB-Q AT BEDTIME 18 mL 1   • Empagliflozin 25 MG Tab Take 1 Tab by mouth every day. 30 Tab 5   • metFORMIN ER (GLUCOPHAGE XR) 500 MG TABLET SR 24 HR TAKE TWO TABLETS (1000MG) BY MOUTH TWICE A  Tab 0   • ADVAIR DISKUS 250-50 MCG/DOSE AEROSOL POWDER, BREATH ACTIVATED INHALE ONE PUFF BY MOUTH EVERY 12 HOURS 180 Inhaler 2   • losartan (COZAAR) 50 MG Tab TAKE ONE TABLET BY MOUTH DAILY 90 Tab  1   • Blood Glucose Test Strips Testing twice a day 60 Applicator 7   • Blood Glucose Monitoring Suppl Device Testing twice a day 1 Device 1   • insulin aspart (NOVOLOG) 100 unit/ml injection PEN Inject 10 Units as instructed 3 times a day before meals. 5 PEN 5   • Dulaglutide (TRULICITY) 1.5 MG/0.5ML Solution Pen-injector Inject 0.5 mL as instructed every 7 days. 4 PEN 6   • pioglitazone (ACTOS) 30 MG Tab Take 1 Tab by mouth every day. 30 Tab 11   • Insulin Pen Needle 32G X 4 MM Misc 1 Each by Does not apply route 4 times a day. 120 Each 11   • albuterol (PROVENTIL) 2.5mg/3ml NEBU 3 mL by Nebulization route every four hours as needed for Shortness of Breath. 100 Bullet 3     No current [facility]-ordered facility-administered medications on file.        Past Medical History:   Diagnosis Date   • Anxiety    • ASTHMA    • Depressed    • Depression    • Diabetes    • DM (diabetes mellitus) ([facility])    • HTN (hypertension)    • Hyperlipidemia LDL goal <70    • Morbid obesity with BMI of 50.0-59.9, adult ([facility])    • Psychiatric disorder     depressed   • Sleep apnea    • Uncontrolled type 2 diabetes mellitus with neurologic complication, without long-term current use of insulin ([facility])        Past Surgical History:   Procedure Laterality Date   • ABDOMINAL HYSTERECTOMY TOTAL     • APPENDECTOMY     • CHOLECYSTECTOMY     • CLUB FOOT RELEASE         Social History     Tobacco Use   • Smoking status: Never Smoker   • Smokeless tobacco: Former User   Substance Use Topics   • Alcohol use: No     Frequency: Never     Drinks per session: Patient refused     Binge frequency: Never   • Drug use: No       Social History     Social History Narrative    ** Merged History Encounter **            Family History   Problem Relation Age of Onset   • Heart Disease Sister          ROS   Patient denies any fever, chills, unintentional weight gain/loss, fatigue, stroke symptoms, dizziness, headache, nasal congestion, sore-throat, cough, heartburn, chest  "pain, difficulty breathing, abdominal discomfort, diarrhea/constipation, burning with urination or frequency, joint or back pain, skin rashes, depression or anxiety.       Objective:     /78   Pulse 68   Temp 37 °C (98.6 °F) (Temporal)   Resp 16   Ht 1.626 m (5' 4\")   Wt (!) 136.5 kg (301 lb)   SpO2 94%  Body mass index is 51.67 kg/m².   Physical Exam:  Constitutional: Alert, no distress. Morbidly obese.  Psych: Alert and oriented x3, appropriate affect and mood.        Assessment and Plan:   The following treatment plan was discussed    1. Morbid obesity with BMI of 50.0-59.9, adult (HCC)  Needs intervention to decrease her CV risk factors and improve metabolism and quality of life.  - REFERRAL TO BARIATRIC SURGERY - Dr. George Spring at [facility] [place].    2. Type 2 diabetes mellitus with diabetic neuropathy, with long-term current use of insulin ([facility])  A1c 8.4% today. No change meds for time being, bariatric surgery will be game changer. Told to avoid all sweets.  - POCT Hemoglobin A1C    3. Mixed hyperlipidemia  LDL needs better control, switch to Lipitor.  - atorvastatin (LIPITOR) 40 MG Tab; Take 1 Tab by mouth every day.  Dispense: 90 Tab; Refill: 0    4. DESEAN on CPAP  F/u with pulm.    5. Essential hypertension  Controlled on meds.    6. Acquired hypothyroidism  Insufficiently controlled. Increase Synthroid to 100 mcg/day.  - levothyroxine (SYNTHROID) 100 MCG Tab; Take 1 Tab by mouth Every morning on an empty stomach.  Dispense: 90 Tab; Refill: 1      Followup: Return in about 3 months (around 9/5/2020), or with new PCP.    Please note that this dictation was created using voice recognition software. I have made every reasonable attempt to correct obvious errors, but I expect that there are errors of grammar and possibly content that I did not discover before finalizing the note.           "

## 2020-06-17 ENCOUNTER — OFFICE VISIT (OUTPATIENT)
Dept: MEDICAL GROUP | Facility: CLINIC | Age: 40
End: 2020-06-17
Payer: MEDICARE

## 2020-06-17 VITALS
HEIGHT: 63 IN | OXYGEN SATURATION: 96 % | SYSTOLIC BLOOD PRESSURE: 114 MMHG | BODY MASS INDEX: 51.91 KG/M2 | TEMPERATURE: 97.3 F | WEIGHT: 293 LBS | HEART RATE: 88 BPM | RESPIRATION RATE: 16 BRPM | DIASTOLIC BLOOD PRESSURE: 70 MMHG

## 2020-06-17 DIAGNOSIS — E11.40 TYPE 2 DIABETES MELLITUS WITH DIABETIC NEUROPATHY, WITH LONG-TERM CURRENT USE OF INSULIN (HCC): ICD-10-CM

## 2020-06-17 DIAGNOSIS — F41.1 GENERALIZED ANXIETY DISORDER: ICD-10-CM

## 2020-06-17 DIAGNOSIS — Z79.4 TYPE 2 DIABETES MELLITUS WITH DIABETIC NEUROPATHY, WITH LONG-TERM CURRENT USE OF INSULIN (HCC): ICD-10-CM

## 2020-06-17 DIAGNOSIS — E78.2 MIXED HYPERLIPIDEMIA: ICD-10-CM

## 2020-06-17 DIAGNOSIS — E03.9 ACQUIRED HYPOTHYROIDISM: ICD-10-CM

## 2020-06-17 DIAGNOSIS — F32.A DEPRESSION, UNSPECIFIED DEPRESSION TYPE: ICD-10-CM

## 2020-06-17 DIAGNOSIS — E66.01 MORBID OBESITY WITH BMI OF 50.0-59.9, ADULT (HCC): ICD-10-CM

## 2020-06-17 DIAGNOSIS — E11.3311 MODERATE NONPROLIFERATIVE DIABETIC RETINOPATHY OF RIGHT EYE WITH MACULAR EDEMA ASSOCIATED WITH TYPE 2 DIABETES MELLITUS (HCC): ICD-10-CM

## 2020-06-17 DIAGNOSIS — R51.9 FREQUENT HEADACHES: ICD-10-CM

## 2020-06-17 DIAGNOSIS — Z00.00 MEDICARE ANNUAL WELLNESS VISIT, SUBSEQUENT: ICD-10-CM

## 2020-06-17 DIAGNOSIS — I10 ESSENTIAL HYPERTENSION: ICD-10-CM

## 2020-06-17 DIAGNOSIS — G47.33 OSA ON CPAP: ICD-10-CM

## 2020-06-17 DIAGNOSIS — F71 MODERATE INTELLECTUAL DISABILITIES: ICD-10-CM

## 2020-06-17 PROBLEM — M79.672 FOOT PAIN, BILATERAL: Status: RESOLVED | Noted: 2018-05-17 | Resolved: 2020-06-17

## 2020-06-17 PROBLEM — M79.671 FOOT PAIN, BILATERAL: Status: RESOLVED | Noted: 2018-05-17 | Resolved: 2020-06-17

## 2020-06-17 PROCEDURE — G0439 PPPS, SUBSEQ VISIT: HCPCS | Performed by: FAMILY MEDICINE

## 2020-06-17 RX ORDER — ESCITALOPRAM OXALATE 10 MG/1
10 TABLET ORAL DAILY
Qty: 30 TAB | Refills: 2 | Status: SHIPPED | OUTPATIENT
Start: 2020-06-17 | End: 2020-09-11 | Stop reason: SDUPTHER

## 2020-06-17 ASSESSMENT — PATIENT HEALTH QUESTIONNAIRE - PHQ9
1. LITTLE INTEREST OR PLEASURE IN DOING THINGS: MORE THAN HALF THE DAYS
9. THOUGHTS THAT YOU WOULD BE BETTER OFF DEAD, OR OF HURTING YOURSELF: MORE THAN HALF THE DAYS
3. TROUBLE FALLING OR STAYING ASLEEP OR SLEEPING TOO MUCH: NEARLY EVERY DAY
SUM OF ALL RESPONSES TO PHQ QUESTIONS 1-9: 18
8. MOVING OR SPEAKING SO SLOWLY THAT OTHER PEOPLE COULD HAVE NOTICED. OR THE OPPOSITE, BEING SO FIGETY OR RESTLESS THAT YOU HAVE BEEN MOVING AROUND A LOT MORE THAN USUAL: MORE THAN HALF THE DAYS
6. FEELING BAD ABOUT YOURSELF - OR THAT YOU ARE A FAILURE OR HAVE LET YOURSELF OR YOUR FAMILY DOWN: SEVERAL DAYS
5. POOR APPETITE OR OVEREATING: MORE THAN HALF THE DAYS
CLINICAL INTERPRETATION OF PHQ2 SCORE: 4
4. FEELING TIRED OR HAVING LITTLE ENERGY: MORE THAN HALF THE DAYS
5. POOR APPETITE OR OVEREATING: 2 - MORE THAN HALF THE DAYS
SUM OF ALL RESPONSES TO PHQ9 QUESTIONS 1 AND 2: 5
SUM OF ALL RESPONSES TO PHQ QUESTIONS 1-9: 17
7. TROUBLE CONCENTRATING ON THINGS, SUCH AS READING THE NEWSPAPER OR WATCHING TELEVISION: SEVERAL DAYS
2. FEELING DOWN, DEPRESSED, IRRITABLE, OR HOPELESS: NEARLY EVERY DAY

## 2020-06-17 ASSESSMENT — ACTIVITIES OF DAILY LIVING (ADL): BATHING_REQUIRES_ASSISTANCE: 0

## 2020-06-17 ASSESSMENT — ENCOUNTER SYMPTOMS: GENERAL WELL-BEING: FAIR

## 2020-06-17 NOTE — LETTER
[facility]  Tadeo Gore M.D.  [address]  [place] [address]  Fax: [phone]   Authorization for Release/Disclosure of   Protected Health Information   Name: TARAH SPAULDING : 1980 SSN: [id number]   Address: [address] [place] [address] Phone:    [phone] (home)    I authorize the entity listed below to release/disclose the PHI below to:   [facility]/Tadeo Gore M.D. and Tadeo Gore M.D.   Provider or Entity Name:  [facility]   Address   City, State, Zip   Phone:      Fax:     Reason for request: continuity of care   Information to be released:    [  ] LAST COLONOSCOPY,  including any PATH REPORT and follow-up  [  ] LAST FIT/COLOGUARD RESULT [  ] LAST DEXA  [  ] LAST MAMMOGRAM  [  ] LAST PAP  [  ] LAST LABS [ X ] RETINA EXAM REPORT  [  ] IMMUNIZATION RECORDS  [  ] Release all info      [  ] Check here and initial the line next to each item to release ALL health information INCLUDING  _____ Care and treatment for drug and / or alcohol abuse  _____ HIV testing, infection status, or AIDS  _____ Genetic Testing    DATES OF SERVICE OR TIME PERIOD TO BE DISCLOSED: _____________  I understand and acknowledge that:  * This Authorization may be revoked at any time by you in writing, except if your health information has already been used or disclosed.  * Your health information that will be used or disclosed as a result of you signing this authorization could be re-disclosed by the recipient. If this occurs, your re-disclosed health information may no longer be protected by State or Federal laws.  * You may refuse to sign this Authorization. Your refusal will not affect your ability to obtain treatment.  * This Authorization becomes effective upon signing and will  on (date) __________.      If no date is indicated, this Authorization will  one (1) year from the signature date.    Name: Tarah Spaulding    Signature:   Date:     2020       PLEASE FAX REQUESTED RECORDS BACK TO: [phone]
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
## 2020-06-19 DIAGNOSIS — I10 ESSENTIAL HYPERTENSION: ICD-10-CM

## 2020-06-19 RX ORDER — LOSARTAN POTASSIUM 25 MG/1
TABLET ORAL
Qty: 180 TAB | Refills: 0 | Status: SHIPPED | OUTPATIENT
Start: 2020-06-19 | End: 2020-11-03 | Stop reason: SDUPTHER

## 2020-06-19 NOTE — TELEPHONE ENCOUNTER
Was the patient seen in the last year in this department? Yes    Does patient have an active prescription for medications requested? Yes    Received Request Via: Pharmacy    Office Visit on 06/05/2020   Component Date Value   • Glycohemoglobin 06/05/2020 8.4*   • Internal Control Negative 06/05/2020 Valid    • Internal Control Positive 06/05/2020 Valid    Office Visit on 03/04/2020   Component Date Value   • Glycohemoglobin 03/04/2020 8.6*   • Internal Control Negative 03/04/2020 Valid    • Internal Control Positive 03/04/2020 Valid    Office Visit on 11/20/2019   Component Date Value   • Glycohemoglobin 11/20/2019 8.0*   • Internal Control Negative 11/20/2019 Negative    • Internal Control Positive 11/20/2019 Positive    Office Visit on 08/21/2019   Component Date Value   • Glycohemoglobin 08/21/2019 8.6*   • Internal Control Negative 08/21/2019 Valid    • Internal Control Positive 08/21/2019 Valid    ]

## 2020-06-21 DIAGNOSIS — L30.4 INTERTRIGO: ICD-10-CM

## 2020-06-22 RX ORDER — INSULIN DEGLUDEC 200 U/ML
60 INJECTION, SOLUTION SUBCUTANEOUS
Qty: 18 ML | Refills: 1 | Status: SHIPPED | OUTPATIENT
Start: 2020-06-22 | End: 2020-11-03 | Stop reason: SDUPTHER

## 2020-06-22 RX ORDER — NYSTATIN 100000 [USP'U]/G
POWDER TOPICAL
Qty: 60 G | Refills: 2 | Status: SHIPPED | OUTPATIENT
Start: 2020-06-22 | End: 2020-11-03 | Stop reason: SDUPTHER

## 2020-07-28 DIAGNOSIS — S32.9XXA PELVIC FRACTURE (HCC): ICD-10-CM

## 2020-07-28 DIAGNOSIS — S22.49XA RIBS, MULTIPLE FRACTURES: ICD-10-CM

## 2020-07-29 RX ORDER — ALBUTEROL SULFATE 2.5 MG/3ML
2.5 SOLUTION RESPIRATORY (INHALATION) EVERY 4 HOURS PRN
Qty: 100 BULLET | Refills: 3 | Status: SHIPPED | OUTPATIENT
Start: 2020-07-29 | End: 2022-02-15 | Stop reason: SDUPTHER

## 2020-09-03 DIAGNOSIS — E78.2 MIXED HYPERLIPIDEMIA: ICD-10-CM

## 2020-09-03 RX ORDER — ATORVASTATIN CALCIUM 40 MG/1
40 TABLET, FILM COATED ORAL DAILY
Qty: 90 TAB | Refills: 0 | Status: SHIPPED | OUTPATIENT
Start: 2020-09-03 | End: 2020-11-03 | Stop reason: SDUPTHER

## 2020-09-03 NOTE — TELEPHONE ENCOUNTER
Was the patient seen in the last year in this department? Yes    Does patient have an active prescription for medications requested? Yes    Received Request Via: Pharmacy    Office Visit on 06/05/2020   Component Date Value   • Glycohemoglobin 06/05/2020 8.4*   • Internal Control Negative 06/05/2020 Valid    • Internal Control Positive 06/05/2020 Valid    Office Visit on 03/04/2020   Component Date Value   • Glycohemoglobin 03/04/2020 8.6*   • Internal Control Negative 03/04/2020 Valid    • Internal Control Positive 03/04/2020 Valid    Office Visit on 11/20/2019   Component Date Value   • Glycohemoglobin 11/20/2019 8.0*   • Internal Control Negative 11/20/2019 Negative    • Internal Control Positive 11/20/2019 Positive    ]

## 2020-09-08 DIAGNOSIS — I10 ESSENTIAL HYPERTENSION: ICD-10-CM

## 2020-09-09 RX ORDER — PIOGLITAZONEHYDROCHLORIDE 30 MG/1
TABLET ORAL
Qty: 90 TAB | Refills: 0 | Status: SHIPPED | OUTPATIENT
Start: 2020-09-09 | End: 2020-10-19 | Stop reason: SDUPTHER

## 2020-09-11 DIAGNOSIS — F32.A DEPRESSION, UNSPECIFIED DEPRESSION TYPE: ICD-10-CM

## 2020-09-11 RX ORDER — ESCITALOPRAM OXALATE 10 MG/1
10 TABLET ORAL DAILY
Qty: 90 TAB | Refills: 0 | Status: SHIPPED | OUTPATIENT
Start: 2020-09-11 | End: 2020-11-03 | Stop reason: SDUPTHER

## 2020-09-11 RX ORDER — GABAPENTIN 300 MG/1
600 CAPSULE ORAL 2 TIMES DAILY
Qty: 360 CAP | Refills: 0 | Status: SHIPPED | OUTPATIENT
Start: 2020-09-11 | End: 2020-11-03 | Stop reason: SDUPTHER

## 2020-09-28 RX ORDER — EMPAGLIFLOZIN 25 MG/1
TABLET, FILM COATED ORAL
Qty: 30 TAB | Refills: 0 | Status: SHIPPED | OUTPATIENT
Start: 2020-09-28 | End: 2020-11-03 | Stop reason: SDUPTHER

## 2020-10-19 DIAGNOSIS — I10 ESSENTIAL HYPERTENSION: ICD-10-CM

## 2020-10-19 RX ORDER — METFORMIN HYDROCHLORIDE 500 MG/1
TABLET, EXTENDED RELEASE ORAL
Qty: 360 TAB | Refills: 0 | Status: SHIPPED | OUTPATIENT
Start: 2020-10-19 | End: 2021-01-21

## 2020-10-19 RX ORDER — PIOGLITAZONEHYDROCHLORIDE 30 MG/1
TABLET ORAL
Qty: 90 TAB | Refills: 0 | Status: SHIPPED | OUTPATIENT
Start: 2020-10-19 | End: 2020-11-03 | Stop reason: SDUPTHER

## 2020-11-03 ENCOUNTER — OFFICE VISIT (OUTPATIENT)
Dept: MEDICAL GROUP | Facility: CLINIC | Age: 40
End: 2020-11-03
Payer: MEDICARE

## 2020-11-03 VITALS
RESPIRATION RATE: 12 BRPM | TEMPERATURE: 98.4 F | OXYGEN SATURATION: 94 % | WEIGHT: 293 LBS | SYSTOLIC BLOOD PRESSURE: 120 MMHG | HEART RATE: 90 BPM | BODY MASS INDEX: 51.91 KG/M2 | HEIGHT: 63 IN | DIASTOLIC BLOOD PRESSURE: 72 MMHG

## 2020-11-03 DIAGNOSIS — G43.119 INTRACTABLE MIGRAINE WITH AURA WITHOUT STATUS MIGRAINOSUS: ICD-10-CM

## 2020-11-03 DIAGNOSIS — Z79.4 TYPE 2 DIABETES MELLITUS WITH DIABETIC NEUROPATHY, WITH LONG-TERM CURRENT USE OF INSULIN (HCC): ICD-10-CM

## 2020-11-03 DIAGNOSIS — E11.40 TYPE 2 DIABETES MELLITUS WITH DIABETIC NEUROPATHY, WITH LONG-TERM CURRENT USE OF INSULIN (HCC): ICD-10-CM

## 2020-11-03 DIAGNOSIS — R07.89 OTHER CHEST PAIN: ICD-10-CM

## 2020-11-03 DIAGNOSIS — E03.9 ACQUIRED HYPOTHYROIDISM: ICD-10-CM

## 2020-11-03 DIAGNOSIS — E78.2 MIXED HYPERLIPIDEMIA: ICD-10-CM

## 2020-11-03 DIAGNOSIS — Z12.31 VISIT FOR SCREENING MAMMOGRAM: ICD-10-CM

## 2020-11-03 DIAGNOSIS — B07.0 PLANTAR WART: ICD-10-CM

## 2020-11-03 DIAGNOSIS — I10 ESSENTIAL HYPERTENSION: ICD-10-CM

## 2020-11-03 DIAGNOSIS — R00.2 PALPITATIONS: ICD-10-CM

## 2020-11-03 DIAGNOSIS — L30.4 INTERTRIGO: ICD-10-CM

## 2020-11-03 DIAGNOSIS — F32.A DEPRESSION, UNSPECIFIED DEPRESSION TYPE: ICD-10-CM

## 2020-11-03 PROCEDURE — 99214 OFFICE O/P EST MOD 30 MIN: CPT | Performed by: PHYSICIAN ASSISTANT

## 2020-11-03 PROCEDURE — 93000 ELECTROCARDIOGRAM COMPLETE: CPT | Performed by: PHYSICIAN ASSISTANT

## 2020-11-03 RX ORDER — INSULIN DEGLUDEC 200 U/ML
60 INJECTION, SOLUTION SUBCUTANEOUS
Qty: 18 ML | Refills: 1 | Status: SHIPPED | OUTPATIENT
Start: 2020-11-03 | End: 2020-12-08 | Stop reason: SDUPTHER

## 2020-11-03 RX ORDER — NYSTATIN 100000 [USP'U]/G
POWDER TOPICAL
Qty: 60 G | Refills: 2 | Status: SHIPPED | OUTPATIENT
Start: 2020-11-03 | End: 2021-04-07 | Stop reason: SDUPTHER

## 2020-11-03 RX ORDER — LEVOTHYROXINE SODIUM 0.07 MG/1
TABLET ORAL
COMMUNITY
Start: 2020-09-15 | End: 2020-11-03

## 2020-11-03 RX ORDER — ESCITALOPRAM OXALATE 10 MG/1
10 TABLET ORAL DAILY
Qty: 90 TAB | Refills: 1 | Status: SHIPPED | OUTPATIENT
Start: 2020-11-03 | End: 2021-02-23

## 2020-11-03 RX ORDER — ATORVASTATIN CALCIUM 40 MG/1
40 TABLET, FILM COATED ORAL DAILY
Qty: 90 TAB | Refills: 1 | Status: SHIPPED | OUTPATIENT
Start: 2020-11-03 | End: 2021-05-05

## 2020-11-03 RX ORDER — INSULIN ASPART 100 [IU]/ML
10 INJECTION, SOLUTION INTRAVENOUS; SUBCUTANEOUS
Qty: 15 ML | Refills: 2 | Status: SHIPPED | OUTPATIENT
Start: 2020-11-03 | End: 2020-11-06 | Stop reason: SDUPTHER

## 2020-11-03 RX ORDER — AFLIBERCEPT 40 MG/ML
INJECTION, SOLUTION INTRAVITREAL DAILY
COMMUNITY
Start: 2020-10-02 | End: 2022-11-17

## 2020-11-03 RX ORDER — DULAGLUTIDE 1.5 MG/.5ML
0.5 INJECTION, SOLUTION SUBCUTANEOUS
Qty: 6 ML | Refills: 2 | Status: SHIPPED | OUTPATIENT
Start: 2020-11-03 | End: 2020-11-06 | Stop reason: SDUPTHER

## 2020-11-03 RX ORDER — AMOXICILLIN 500 MG/1
CAPSULE ORAL
COMMUNITY
Start: 2020-10-01 | End: 2020-11-03

## 2020-11-03 RX ORDER — LEVOTHYROXINE SODIUM 0.1 MG/1
100 TABLET ORAL
Qty: 90 TAB | Refills: 1 | Status: SHIPPED | OUTPATIENT
Start: 2020-11-03 | End: 2021-05-05

## 2020-11-03 RX ORDER — PEN NEEDLE, DIABETIC 30 GX3/16"
1 NEEDLE, DISPOSABLE MISCELLANEOUS 4 TIMES DAILY
Qty: 360 EACH | Refills: 2 | Status: SHIPPED
Start: 2020-11-03 | End: 2021-07-07

## 2020-11-03 RX ORDER — GABAPENTIN 300 MG/1
600 CAPSULE ORAL 2 TIMES DAILY
Qty: 360 CAP | Refills: 1 | Status: SHIPPED | OUTPATIENT
Start: 2020-11-03 | End: 2021-04-21 | Stop reason: SDUPTHER

## 2020-11-03 RX ORDER — PIOGLITAZONEHYDROCHLORIDE 30 MG/1
TABLET ORAL
Qty: 90 TAB | Refills: 0 | Status: SHIPPED | OUTPATIENT
Start: 2020-11-03 | End: 2021-02-11

## 2020-11-03 RX ORDER — LOSARTAN POTASSIUM 25 MG/1
TABLET ORAL
Qty: 180 TAB | Refills: 1 | Status: SHIPPED | OUTPATIENT
Start: 2020-11-03 | End: 2021-04-30

## 2020-11-03 RX ORDER — EMPAGLIFLOZIN 25 MG/1
1 TABLET, FILM COATED ORAL DAILY
Qty: 90 TAB | Refills: 1 | Status: SHIPPED | OUTPATIENT
Start: 2020-11-03 | End: 2021-05-03

## 2020-11-03 NOTE — PROGRESS NOTES
cc: Establish care    Subjective:     Tarah Britton is a 40 y.o. female presenting for establish care    Patient presents to the office for establish care.  Patient states that she is needing all of her medications refilled.  She is needing medications refilled for her hyperlipidemia, hypertension, hypothyroidism, and type 2 diabetes uncontrolled with insulin use.  I do not have current lab work for patient at this time.    Patient also reports a chronic skin infection where she uses nystatin powder and states that she is almost out.  She is requesting a refill of this medication.    Patient states that she has migraines every couple of days.  She states that they have made her cry.  She states that she has been taking advil to help with her symptoms.  Most of the time it helps, but sometimes it does not.  She states that she will see wavy lines and dots when they come on.  She denies sensing odors.  Light will sometimes make headaches worse.  Sometimes she will take up to 1000 mg of ibuprofen at a time.    Patient states that she states that she has a hole in the third right toe.  She reports that it has been there for at least many months.  She states that Dr. Gore was treating this.  She indicates that she has been putting antibiotic ointment as well as a Silvadene cream on her toe and it is helped to heal the hole in her total.  She is requesting further medication.    Patient states that she has an enlarged heart.  She states that she has been aware for a few years.  She states that she was told that she had an enlarged heart by a cardiologist in [place].  She states that she was diagnosed with pneumonia at the time.  Patient states that she will occasionally have chest pain and palpitations.  Patient indicates that she has never had any type of ultrasound of her heart done any type but was told that her heart is enlarged based on EKG.  She states that she was informed it was right sided heart  enlargement.    Patient is requesting a mammogram.    Review of systems:  See above.   Denies any symptoms unless previously indicated.        Current Outpatient Medications:   •  gabapentin (NEURONTIN) 300 MG Cap, Take 2 Caps by mouth 2 Times a Day., Disp: 360 Cap, Rfl: 1  •  Insulin Degludec (TRESIBA FLEXTOUCH) 200 UNIT/ML Solution Pen-injector, Inject 60 Units as instructed every bedtime., Disp: 18 mL, Rfl: 1  •  atorvastatin (LIPITOR) 40 MG Tab, Take 1 Tab by mouth every day., Disp: 90 Tab, Rfl: 1  •  levothyroxine (SYNTHROID) 100 MCG Tab, Take 1 Tab by mouth Every morning on an empty stomach., Disp: 90 Tab, Rfl: 1  •  insulin aspart (NOVOLOG FLEXPEN) 100 UNIT/ML injection PEN, Inject 10 Units as instructed 3 times a day before meals., Disp: 15 mL, Rfl: 2  •  escitalopram (LEXAPRO) 10 MG Tab, Take 1 Tab by mouth every day., Disp: 90 Tab, Rfl: 1  •  Insulin Pen Needle (PEN NEEDLES) 32G X 4 MM Misc, 1 Stick by Does not apply route 4 times a day., Disp: 360 Each, Rfl: 2  •  Dulaglutide (TRULICITY) 1.5 MG/0.5ML Solution Pen-injector, Inject 0.5 mL as instructed every 7 days., Disp: 6 mL, Rfl: 2  •  Empagliflozin (JARDIANCE) 25 MG Tab, Take 1 Tab by mouth every day. TAKE ONE TABLET BY MOUTH DAILY, Disp: 90 Tab, Rfl: 1  •  losartan (COZAAR) 25 MG Tab, TAKE TWO TABLETS BY MOUTH DAILY, Disp: 180 Tab, Rfl: 1  •  nystatin (MYCOSTATIN) powder, Apply to beneath breasts and to skin folds twice a day, Disp: 60 g, Rfl: 2  •  pioglitazone (ACTOS) 30 MG Tab, TAKE ONE TABLET BY MOUTH DAILY, Disp: 90 Tab, Rfl: 0  •  metFORMIN ER (GLUCOPHAGE XR) 500 MG TABLET SR 24 HR, TAKE TWO TABLETS (1000MG) BY MOUTH TWICE A DAY, Disp: 360 Tab, Rfl: 0  •  albuterol (PROVENTIL) 2.5mg/3ml Nebu Soln solution for nebulization, 3 mL by Nebulization route every four hours as needed for Shortness of Breath., Disp: 100 Bullet, Rfl: 3  •  Insulin Pen Needle 32G X 4 MM Misc, 1 Each by Does not apply route 4 times a day., Disp: 120 Each, Rfl: 11  •  EYLEA  "2 MG/0.05ML Solution, , Disp: , Rfl:     Allergies, past medical history, past surgical history, family history, social history reviewed and updated    Objective:     Vitals: /72 (BP Location: Left arm, Patient Position: Sitting, BP Cuff Size: Adult long)   Pulse 90   Temp 36.9 °C (98.4 °F) (Temporal)   Resp 12   Ht 1.6 m (5' 3\")   Wt (!) 142.9 kg (315 lb)   SpO2 94%   BMI 55.80 kg/m²   General: Alert, pleasant, NAD  EYES:   PERRL, EOMI, no icterus or pallor.  Conjunctivae and lids normal.   HENT:  Normocephalic.  External ears normal. Facial mask in place. Oropharynx non-erythematous, mucous membranes moist.   Heart: Regular rate and rhythm.  S1 and S2 normal.  No murmurs appreciated.   Distant heart sounds  Respiratory: Normal respiratory effort.  Clear to auscultation bilaterally.  Abdomen: obese  Skin: Warm, dry, no rashes.  Musculoskeletal: Gait is normal.  Moves all extremities well.    Extremities: appearance of wart lateral side right 3rd toe with divot.   Neurological: No tremors, sensation grossly intact,  CN2-12 intact.  Psych:  Affect/mood is normal, judgement is good, memory is intact, grooming is appropriate.  EKG: Normal sinus normal rhythm with no ST elevation or depression.    Assessment/Plan:     Tarah was seen today for establish care and diabetes.    Diagnoses and all orders for this visit:    Mixed hyperlipidemia  -     Lipid Profile; Future  -     Comp Metabolic Panel; Future  -     atorvastatin (LIPITOR) 40 MG Tab; Take 1 Tab by mouth every day.  Essential hypertension  -     Comp Metabolic Panel; Future  -     losartan (COZAAR) 25 MG Tab; TAKE TWO TABLETS BY MOUTH DAILY  -     pioglitazone (ACTOS) 30 MG Tab; TAKE ONE TABLET BY MOUTH DAILY  Acquired hypothyroidism  -     Lipid Profile; Future  -     Comp Metabolic Panel; Future  -     TSH; Future  -     FREE THYROXINE; Future  -     levothyroxine (SYNTHROID) 100 MCG Tab; Take 1 Tab by mouth Every morning on an empty stomach.  Type " 2 diabetes mellitus with diabetic neuropathy, with long-term current use of insulin (HCC)  -     Lipid Profile; Future  -     Comp Metabolic Panel; Future  -     HEMOGLOBIN A1C; Future  -     gabapentin (NEURONTIN) 300 MG Cap; Take 2 Caps by mouth 2 Times a Day.  -     Insulin Degludec (TRESIBA FLEXTOUCH) 200 UNIT/ML Solution Pen-injector; Inject 60 Units as instructed every bedtime.  -     insulin aspart (NOVOLOG FLEXPEN) 100 UNIT/ML injection PEN; Inject 10 Units as instructed 3 times a day before meals.  -     Insulin Pen Needle (PEN NEEDLES) 32G X 4 MM Misc; 1 Stick by Does not apply route 4 times a day.  -     Dulaglutide (TRULICITY) 1.5 MG/0.5ML Solution Pen-injector; Inject 0.5 mL as instructed every 7 days.  -     REFERRAL TO PODIATRY  Uncontrolled type 2 diabetes mellitus with neurologic complication, without long-term current use of insulin ([facility])  -     Empagliflozin (JARDIANCE) 25 MG Tab; Take 1 Tab by mouth every day. TAKE ONE TABLET BY MOUTH DAILY  -     pioglitazone (ACTOS) 30 MG Tab; TAKE ONE TABLET BY MOUTH DAILY    Do not believe diabetes is controlled.  We do not have any current labs to go by.  We will go ahead and order labs at this time and I will refill medications.  Plan will be to follow-up in 4 weeks, sooner if needed.  I am also concerned that thyroid is not controlled.  Again labs have been ordered to evaluate further.    Depression, unspecified depression type  -     escitalopram (LEXAPRO) 10 MG Tab; Take 1 Tab by mouth every day.    Medications refilled at this time.    Intertrigo  -     nystatin (MYCOSTATIN) powder; Apply to beneath breasts and to skin folds twice a day    Medications refilled at this time.    Intractable migraine with aura without status migrainosus    Hesitant to start any type of migraine medication without knowing patient's current cardiac status.  She indicates a history of right-sided hypertrophy.  EKG is normal.  Will obtain further testing before we begin  medication.    Plantar wart  -     REFERRAL TO PODIATRY    Referral submitted.    Other chest pain  -     EKG - Clinic Performed  -     EC-ECHOCARDIOGRAM COMPLETE W/O CONT; Future  Palpitations  -     EC-ECHOCARDIOGRAM COMPLETE W/O CONT; Future    We will obtain an echo to evaluate further.  The plan will be to follow-up in 4 weeks.  If normal, will consider migraine medication.    Visit for screening mammogram  -     MA-SCREENING MAMMO BILAT W/TOMOSYNTHESIS W/CAD; Future    Mammogram screening ordered.        No follow-ups on file.    Please note that this dictation was created using voice recognition software. I have made every reasonable attempt to correct obvious errors, but expect that there are errors of grammar and possible content that I did not discover before finalizing note.

## 2020-12-07 ENCOUNTER — TELEPHONE (OUTPATIENT)
Dept: MEDICAL GROUP | Facility: CLINIC | Age: 40
End: 2020-12-07

## 2020-12-08 ENCOUNTER — TELEPHONE (OUTPATIENT)
Dept: MEDICAL GROUP | Facility: CLINIC | Age: 40
End: 2020-12-08

## 2020-12-08 ENCOUNTER — TELEMEDICINE (OUTPATIENT)
Dept: MEDICAL GROUP | Facility: CLINIC | Age: 40
End: 2020-12-08
Payer: MEDICARE

## 2020-12-08 DIAGNOSIS — Z79.4 TYPE 2 DIABETES MELLITUS WITH DIABETIC NEUROPATHY, WITH LONG-TERM CURRENT USE OF INSULIN (HCC): ICD-10-CM

## 2020-12-08 DIAGNOSIS — E11.40 TYPE 2 DIABETES MELLITUS WITH DIABETIC NEUROPATHY, WITH LONG-TERM CURRENT USE OF INSULIN (HCC): ICD-10-CM

## 2020-12-08 DIAGNOSIS — E11.3311 MODERATE NONPROLIFERATIVE DIABETIC RETINOPATHY OF RIGHT EYE WITH MACULAR EDEMA ASSOCIATED WITH TYPE 2 DIABETES MELLITUS (HCC): ICD-10-CM

## 2020-12-08 DIAGNOSIS — E78.2 MIXED HYPERLIPIDEMIA: ICD-10-CM

## 2020-12-08 PROCEDURE — 99213 OFFICE O/P EST LOW 20 MIN: CPT | Mod: 95,CR | Performed by: PHYSICIAN ASSISTANT

## 2020-12-08 RX ORDER — INSULIN ASPART 100 [IU]/ML
11 INJECTION, SOLUTION INTRAVENOUS; SUBCUTANEOUS
Qty: 15 ML | Refills: 2
Start: 2020-12-08 | End: 2021-04-09 | Stop reason: SDUPTHER

## 2020-12-08 RX ORDER — INSULIN DEGLUDEC 200 U/ML
70 INJECTION, SOLUTION SUBCUTANEOUS
Qty: 18 ML | Refills: 1
Start: 2020-12-08 | End: 2021-04-09 | Stop reason: SDUPTHER
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
## 2020-12-08 NOTE — TELEPHONE ENCOUNTER
----- Message from Tarah Britton sent at 12/4/2020  1:09 PM PST -----  Regarding: Test Result Question  Contact: [phone]  What is my a1c

## 2020-12-21 ENCOUNTER — TELEPHONE (OUTPATIENT)
Dept: MEDICAL GROUP | Facility: CLINIC | Age: 40
End: 2020-12-21

## 2020-12-21 DIAGNOSIS — N63.0 BREAST LUMP: ICD-10-CM

## 2020-12-21 NOTE — TELEPHONE ENCOUNTER
VOICEMAIL  1. Caller Name: Juvenal Ibarra                      Call Back Number: [phone]    2. Message: Pt has a small mass on Left Breast would like an order for a left breast diagnotic ultrasound. Please fax to [phone]    3. Patient approves office to leave a detailed voicemail/MyChart message: N\A

## 2020-12-21 NOTE — TELEPHONE ENCOUNTER
I ordered a diagnostic mammogram with left breast ultrasound.  Please send orders to [facility].  If insurance does not cover, she may need to be seen for exam in office.

## 2021-02-23 DIAGNOSIS — F32.A DEPRESSION, UNSPECIFIED DEPRESSION TYPE: ICD-10-CM

## 2021-02-23 RX ORDER — METFORMIN HYDROCHLORIDE 500 MG/1
TABLET, EXTENDED RELEASE ORAL
Qty: 360 TABLET | Refills: 0 | Status: SHIPPED | OUTPATIENT
Start: 2021-02-23 | End: 2021-05-03 | Stop reason: SDUPTHER

## 2021-02-23 RX ORDER — ESCITALOPRAM OXALATE 10 MG/1
TABLET ORAL
Qty: 90 TABLET | Refills: 0 | Status: SHIPPED | OUTPATIENT
Start: 2021-02-23 | End: 2021-05-10 | Stop reason: SDUPTHER

## 2021-02-23 NOTE — TELEPHONE ENCOUNTER
Was the patient seen in the last year in this department? Yes    Does patient have an active prescription for medications requested? Yes    Received Request Via: Pharmacy    Office Visit on 06/05/2020   Component Date Value   • Glycohemoglobin 06/05/2020 8.4*   • Internal Control Negative 06/05/2020 Valid    • Internal Control Positive 06/05/2020 Valid    Office Visit on 03/04/2020   Component Date Value   • Glycohemoglobin 03/04/2020 8.6*   • Internal Control Negative 03/04/2020 Valid    • Internal Control Positive 03/04/2020 Valid    ]

## 2021-04-07 ENCOUNTER — OFFICE VISIT (OUTPATIENT)
Dept: MEDICAL GROUP | Facility: CLINIC | Age: 41
End: 2021-04-07
Payer: COMMERCIAL

## 2021-04-07 ENCOUNTER — HOSPITAL ENCOUNTER (OUTPATIENT)
Facility: MEDICAL CENTER | Age: 41
End: 2021-04-07
Attending: PHYSICIAN ASSISTANT
Payer: COMMERCIAL

## 2021-04-07 VITALS
HEART RATE: 89 BPM | WEIGHT: 293 LBS | HEIGHT: 64 IN | OXYGEN SATURATION: 95 % | BODY MASS INDEX: 50.02 KG/M2 | SYSTOLIC BLOOD PRESSURE: 128 MMHG | DIASTOLIC BLOOD PRESSURE: 78 MMHG | RESPIRATION RATE: 16 BRPM | TEMPERATURE: 97 F

## 2021-04-07 DIAGNOSIS — E03.9 ACQUIRED HYPOTHYROIDISM: ICD-10-CM

## 2021-04-07 DIAGNOSIS — R21 RASH: ICD-10-CM

## 2021-04-07 DIAGNOSIS — I10 ESSENTIAL HYPERTENSION: ICD-10-CM

## 2021-04-07 DIAGNOSIS — Z79.4 TYPE 2 DIABETES MELLITUS WITH DIABETIC NEUROPATHY, WITH LONG-TERM CURRENT USE OF INSULIN (HCC): ICD-10-CM

## 2021-04-07 DIAGNOSIS — E78.2 MIXED HYPERLIPIDEMIA: ICD-10-CM

## 2021-04-07 DIAGNOSIS — L30.4 INTERTRIGO: ICD-10-CM

## 2021-04-07 DIAGNOSIS — E11.40 TYPE 2 DIABETES MELLITUS WITH DIABETIC NEUROPATHY, WITH LONG-TERM CURRENT USE OF INSULIN (HCC): ICD-10-CM

## 2021-04-07 DIAGNOSIS — E66.01 MORBID OBESITY WITH BMI OF 50.0-59.9, ADULT (HCC): ICD-10-CM

## 2021-04-07 PROCEDURE — 87075 CULTR BACTERIA EXCEPT BLOOD: CPT

## 2021-04-07 PROCEDURE — 87077 CULTURE AEROBIC IDENTIFY: CPT

## 2021-04-07 PROCEDURE — 87070 CULTURE OTHR SPECIMN AEROBIC: CPT

## 2021-04-07 PROCEDURE — 99213 OFFICE O/P EST LOW 20 MIN: CPT | Performed by: PHYSICIAN ASSISTANT

## 2021-04-07 PROCEDURE — 99000 SPECIMEN HANDLING OFFICE-LAB: CPT | Performed by: PHYSICIAN ASSISTANT

## 2021-04-07 PROCEDURE — 87205 SMEAR GRAM STAIN: CPT

## 2021-04-07 RX ORDER — FLUCONAZOLE 150 MG/1
TABLET ORAL
Qty: 2 TABLET | Refills: 0 | Status: SHIPPED
Start: 2021-04-07 | End: 2021-07-19

## 2021-04-07 RX ORDER — NYSTATIN 100000 [USP'U]/G
POWDER TOPICAL
Qty: 120 G | Refills: 2 | Status: SHIPPED | OUTPATIENT
Start: 2021-04-07 | End: 2021-04-29 | Stop reason: SDUPTHER

## 2021-04-07 NOTE — PROGRESS NOTES
cc:  rash    Subjective:     Tarah Britton is a 40 y.o. female presenting for rash      Patient presents to the office for rash.  Patient states that she is in an environment that causes frequent sweating.  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   She indicates that this is an ongoing issue for her and has frequent outbreaks.  It has been an ongoing issue for years.  She presents today as she is requesting her nystatin powder be filled.  It seems to work for her but again, the rash continues to come and go.    Patient is overdue for routine lab work.  She does have a history of morbid obesity with type 2 diabetes, mixed hyperlipidemia hypertension and hypothyroidism.      Review of systems:  See above.   Denies any symptoms unless previously indicated.        Current Outpatient Medications:   •  fluconazole (DIFLUCAN) 150 MG tablet, Take one tab weekly for 2 weeks, Disp: 2 tablet, Rfl: 0  •  nystatin (MYCOSTATIN) powder, Apply to beneath breasts and to skin folds twice a day, Disp: 120 g, Rfl: 2  •  Dulaglutide (TRULICITY) 1.5 MG/0.5ML Solution Pen-injector, Inject 0.5 mL under the skin every 7 days., Disp: 6 mL, Rfl: 0  •  escitalopram (LEXAPRO) 10 MG Tab, TAKE ONE TABLET BY MOUTH DAILY, Disp: 90 tablet, Rfl: 0  •  metFORMIN ER (GLUCOPHAGE XR) 500 MG TABLET SR 24 HR, TAKE TWO TABLETS BY MOUTH TWICE A DAY, Disp: 360 tablet, Rfl: 0  •  pioglitazone (ACTOS) 30 MG Tab, TAKE ONE TABLET BY MOUTH DAILY, Disp: 30 tablet, Rfl: 0  •  Insulin Degludec (TRESIBA FLEXTOUCH) 200 UNIT/ML Solution Pen-injector, Inject 70 Units under the skin every bedtime., Disp: 18 mL, Rfl: 1  •  insulin aspart (NOVOLOG FLEXPEN) 100 UNIT/ML injection PEN, Inject 11 Units under the skin 3 times a day before meals., Disp: 15 mL, Rfl: 2  •  EYLEA 2 MG/0.05ML Solution, , Disp: , Rfl:   •  gabapentin (NEURONTIN) 300 MG Cap, Take 2 Caps by mouth 2 Times a Day., Disp: 360 Cap, Rfl: 1  •   "atorvastatin (LIPITOR) 40 MG Tab, Take 1 Tab by mouth every day., Disp: 90 Tab, Rfl: 1  •  levothyroxine (SYNTHROID) 100 MCG Tab, Take 1 Tab by mouth Every morning on an empty stomach., Disp: 90 Tab, Rfl: 1  •  Insulin Pen Needle (PEN NEEDLES) 32G X 4 MM Misc, 1 Stick by Does not apply route 4 times a day., Disp: 360 Each, Rfl: 2  •  Empagliflozin (JARDIANCE) 25 MG Tab, Take 1 Tab by mouth every day. TAKE ONE TABLET BY MOUTH DAILY, Disp: 90 Tab, Rfl: 1  •  losartan (COZAAR) 25 MG Tab, TAKE TWO TABLETS BY MOUTH DAILY, Disp: 180 Tab, Rfl: 1  •  albuterol (PROVENTIL) 2.5mg/3ml Nebu Soln solution for nebulization, 3 mL by Nebulization route every four hours as needed for Shortness of Breath., Disp: 100 Bullet, Rfl: 3  •  Insulin Pen Needle 32G X 4 MM Misc, 1 Each by Does not apply route 4 times a day., Disp: 120 Each, Rfl: 11    Allergies, past medical history, past surgical history, family history, social history reviewed and updated    Objective:     Vitals: /78   Pulse 89   Temp 36.1 °C (97 °F) (Temporal)   Resp 16   Ht 1.626 m (5' 4\")   Wt (!) 145 kg (320 lb)   SpO2 95%   BMI 54.93 kg/m²   General: Alert, pleasant, NAD  EYES:   PERRL, EOMI, no icterus or pallor.  Conjunctivae and lids normal.   HENT:  Normocephalic.  External ears normal.  Neck supple.    Respiratory: Normal respiratory effort.   Abdomen: Obese.  Rash under pannus expands entire pannus down towards groin as well.  Odor associated with the rash.  Skin: Warm, dry, odorous macular rash underneath pannus  Musculoskeletal: Gait is normal.  Moves all extremities well.    Neurological: No tremors, sensation grossly intact,  CN2-12 intact.  Psych:  Affect/mood is normal, judgement is good, memory is intact, grooming is appropriate.    Assessment/Plan:     Tarah was seen today for medication refill.    Diagnoses and all orders for this visit:    Intertrigo  -     fluconazole (DIFLUCAN) 150 MG tablet; Take one tab weekly for 2 weeks  -     " nystatin (MYCOSTATIN) powder; Apply to beneath breasts and to skin folds twice a day  -     ANAEROBIC/AEROBIC/GRAM STAIN  Rash  -     fluconazole (DIFLUCAN) 150 MG tablet; Take one tab weekly for 2 weeks  -     nystatin (MYCOSTATIN) powder; Apply to beneath breasts and to skin folds twice a day  -     ANAEROBIC/AEROBIC/GRAM STAIN    Most likely yeast in nature.  However I have taken a culture today as patient has frequent outbreaks and no significant resolution.  Would like to make sure this is not bacterial.  Culture collected and will be sent to the lab for analysis.  We will refill the nystatin powder.  However I would like patient to try Diflucan to see if this will have an impact on her rash.  She understands to hold off on the nystatin powder while taking the Diflucan.  The plan will be to follow-up in approximately 2 to 4 weeks to see how she is doing with the rash and if the fluconazole was helpful.    Type 2 diabetes mellitus with diabetic neuropathy, with long-term current use of insulin (HCC)  -     Lipid Profile; Future  -     Comp Metabolic Panel; Future  -     TSH; Future  -     HEMOGLOBIN A1C; Future  -     FREE THYROXINE; Future  Mixed hyperlipidemia  -     Lipid Profile; Future  -     Comp Metabolic Panel; Future  -     TSH; Future  -     FREE THYROXINE; Future  Essential hypertension  -     Lipid Profile; Future  -     Comp Metabolic Panel; Future  -     TSH; Future  -     FREE THYROXINE; Future  Acquired hypothyroidism  -     TSH; Future  -     FREE THYROXINE; Future  Morbid obesity with BMI of 50.0-59.9, adult ([facility])    Labs been ordered to evaluate further.  We will follow-up with lab results when she returns in 2 to 4 weeks.        Return in about 4 weeks (around 5/5/2021), or if symptoms worsen or fail to improve, for 2-4 weeks.    Please note that this dictation was created using voice recognition software. I have made every reasonable attempt to correct obvious errors, but expect that there  are errors of grammar and possible content that I did not discover before finalizing note.

## 2021-04-08 ENCOUNTER — PATIENT MESSAGE (OUTPATIENT)
Dept: MEDICAL GROUP | Facility: CLINIC | Age: 41
End: 2021-04-08

## 2021-04-08 DIAGNOSIS — E11.3311 MODERATE NONPROLIFERATIVE DIABETIC RETINOPATHY OF RIGHT EYE WITH MACULAR EDEMA ASSOCIATED WITH TYPE 2 DIABETES MELLITUS (HCC): ICD-10-CM

## 2021-04-08 DIAGNOSIS — E11.40 TYPE 2 DIABETES MELLITUS WITH DIABETIC NEUROPATHY, WITH LONG-TERM CURRENT USE OF INSULIN (HCC): ICD-10-CM

## 2021-04-08 DIAGNOSIS — Z79.4 TYPE 2 DIABETES MELLITUS WITH DIABETIC NEUROPATHY, WITH LONG-TERM CURRENT USE OF INSULIN (HCC): ICD-10-CM

## 2021-04-08 LAB
GRAM STN SPEC: NORMAL
SIGNIFICANT IND 70042: NORMAL
SITE SITE: NORMAL
SOURCE SOURCE: NORMAL

## 2021-04-09 RX ORDER — INSULIN ASPART 100 [IU]/ML
11 INJECTION, SOLUTION INTRAVENOUS; SUBCUTANEOUS
Qty: 15 ML | Refills: 0 | Status: SHIPPED
Start: 2021-04-09 | End: 2021-04-14

## 2021-04-09 RX ORDER — INSULIN DEGLUDEC 200 U/ML
70 INJECTION, SOLUTION SUBCUTANEOUS
Qty: 18 ML | Refills: 0 | Status: SHIPPED | OUTPATIENT
Start: 2021-04-09 | End: 2021-08-16 | Stop reason: SDUPTHER

## 2021-04-09 NOTE — PATIENT COMMUNICATION
Was the patient seen in the last year in this department? Yes    Does patient have an active prescription for medications requested? Yes    Received Request Via: Patient    Hospital Outpatient Visit on 04/07/2021   Component Date Value   • Significant Indicator 04/07/2021 NEG    • Source 04/07/2021 WND    • Site 04/07/2021 BENEATH BREASTS    • Culture Result 04/07/2021 -    • Significant Indicator 04/07/2021 NEG    • Source 04/07/2021 WND    • Site 04/07/2021 BENEATH BREASTS    • Culture Result 04/07/2021 -    • Gram Stain Result 04/07/2021                      Value:Rare Gram positive cocci.  Rare Gram positive rods.     • Significant Indicator 04/07/2021 .    • Source 04/07/2021 WND    • Site 04/07/2021 BENEATH BREASTS    • Gram Stain Result 04/07/2021                      Value:Rare Gram positive cocci.  Rare Gram positive rods.     Office Visit on 06/05/2020   Component Date Value   • Glycohemoglobin 06/05/2020 8.4*   • Internal Control Negative 06/05/2020 Valid    • Internal Control Positive 06/05/2020 Valid    ]

## 2021-04-09 NOTE — TELEPHONE ENCOUNTER
From: Tarah Britton  To: Physician Assistant Rosa Gross  Sent: 4/8/2021 6:27 PM PDT  Subject: Prescription Question    I need to get more insulin like  Tresiba and novolog ok

## 2021-04-11 LAB
BACTERIA SPEC ANAEROBE CULT: NORMAL
SIGNIFICANT IND 70042: NORMAL
SITE SITE: NORMAL
SOURCE SOURCE: NORMAL

## 2021-04-12 DIAGNOSIS — R21 RASH: ICD-10-CM

## 2021-04-12 RX ORDER — SULFAMETHOXAZOLE AND TRIMETHOPRIM 800; 160 MG/1; MG/1
1 TABLET ORAL 2 TIMES DAILY
Qty: 20 TABLET | Refills: 0 | Status: SHIPPED
Start: 2021-04-12 | End: 2021-07-19

## 2021-04-14 DIAGNOSIS — E11.40 TYPE 2 DIABETES MELLITUS WITH DIABETIC NEUROPATHY, WITH LONG-TERM CURRENT USE OF INSULIN (HCC): ICD-10-CM

## 2021-04-14 DIAGNOSIS — Z79.4 TYPE 2 DIABETES MELLITUS WITH DIABETIC NEUROPATHY, WITH LONG-TERM CURRENT USE OF INSULIN (HCC): ICD-10-CM

## 2021-04-14 LAB
BACTERIA WND AEROBE CULT: ABNORMAL
BACTERIA WND AEROBE CULT: ABNORMAL
GRAM STN SPEC: ABNORMAL
SIGNIFICANT IND 70042: ABNORMAL
SITE SITE: ABNORMAL
SOURCE SOURCE: ABNORMAL

## 2021-04-14 RX ORDER — INSULIN LISPRO 100 [IU]/ML
11 INJECTION, SOLUTION INTRAVENOUS; SUBCUTANEOUS
Qty: 15 EACH | Refills: 1 | Status: SHIPPED | OUTPATIENT
Start: 2021-04-14 | End: 2021-08-16 | Stop reason: SDUPTHER

## 2021-04-21 DIAGNOSIS — Z79.4 TYPE 2 DIABETES MELLITUS WITH DIABETIC NEUROPATHY, WITH LONG-TERM CURRENT USE OF INSULIN (HCC): ICD-10-CM

## 2021-04-21 DIAGNOSIS — E11.40 TYPE 2 DIABETES MELLITUS WITH DIABETIC NEUROPATHY, WITH LONG-TERM CURRENT USE OF INSULIN (HCC): ICD-10-CM

## 2021-04-21 RX ORDER — GABAPENTIN 300 MG/1
600 CAPSULE ORAL 2 TIMES DAILY
Qty: 360 CAPSULE | Refills: 0 | Status: SHIPPED | OUTPATIENT
Start: 2021-04-21 | End: 2021-07-26

## 2021-04-21 NOTE — TELEPHONE ENCOUNTER
Was the patient seen in the last year in this department? Yes    Does patient have an active prescription for medications requested? Yes    Received Request Via: Patient    Hospital Outpatient Visit on 04/07/2021   Component Date Value   • Significant Indicator 04/07/2021 NEG    • Source 04/07/2021 WND    • Site 04/07/2021 BENEATH BREASTS    • Culture Result 04/07/2021 No Anaerobes isolated.    • Significant Indicator 04/07/2021 POS*   • Source 04/07/2021 WND    • Site 04/07/2021 BENEATH BREASTS    • Culture Result 04/07/2021 Moderate growth usual skin felipe.*   • Gram Stain Result 04/07/2021                      Value:Rare Gram positive cocci.  Rare Gram positive rods.     • Culture Result 04/07/2021 *                    Value:Streptococcus agalactiae (Group B)  Rare growth  Group B Streptococci are susceptible to ampicillin,  penicillin and cefazolin but may be erythromycin and/or  clindamycin resistant.  Notify Microbiology if erythromycin  or clindamycin testing is required.     • Significant Indicator 04/07/2021 .    • Source 04/07/2021 WND    • Site 04/07/2021 BENEATH BREASTS    • Gram Stain Result 04/07/2021                      Value:Rare Gram positive cocci.  Rare Gram positive rods.     Office Visit on 06/05/2020   Component Date Value   • Glycohemoglobin 06/05/2020 8.4*   • Internal Control Negative 06/05/2020 Valid    • Internal Control Positive 06/05/2020 Valid    ]

## 2021-04-29 ENCOUNTER — PATIENT MESSAGE (OUTPATIENT)
Dept: MEDICAL GROUP | Facility: CLINIC | Age: 41
End: 2021-04-29

## 2021-04-29 DIAGNOSIS — L30.4 INTERTRIGO: ICD-10-CM

## 2021-04-29 DIAGNOSIS — R21 RASH: ICD-10-CM

## 2021-04-29 RX ORDER — NYSTATIN 100000 [USP'U]/G
POWDER TOPICAL
Qty: 120 G | Refills: 5 | Status: SHIPPED | OUTPATIENT
Start: 2021-04-29 | End: 2021-07-22 | Stop reason: SDUPTHER

## 2021-05-05 DIAGNOSIS — E03.9 ACQUIRED HYPOTHYROIDISM: ICD-10-CM

## 2021-05-05 DIAGNOSIS — E78.2 MIXED HYPERLIPIDEMIA: ICD-10-CM

## 2021-05-05 RX ORDER — LEVOTHYROXINE SODIUM 0.1 MG/1
TABLET ORAL
Qty: 90 TABLET | Refills: 0 | Status: SHIPPED | OUTPATIENT
Start: 2021-05-05 | End: 2021-08-01 | Stop reason: SDUPTHER

## 2021-05-05 RX ORDER — ATORVASTATIN CALCIUM 40 MG/1
TABLET, FILM COATED ORAL
Qty: 90 TABLET | Refills: 0 | Status: SHIPPED | OUTPATIENT
Start: 2021-05-05 | End: 2021-08-01 | Stop reason: SDUPTHER

## 2021-05-05 NOTE — TELEPHONE ENCOUNTER
Was the patient seen in the last year in this department? Yes    Does patient have an active prescription for medications requested? Yes    Received Request Via: Pharmacy    Hospital Outpatient Visit on 04/07/2021   Component Date Value   • Significant Indicator 04/07/2021 NEG    • Source 04/07/2021 WND    • Site 04/07/2021 BENEATH BREASTS    • Culture Result 04/07/2021 No Anaerobes isolated.    • Significant Indicator 04/07/2021 POS*   • Source 04/07/2021 WND    • Site 04/07/2021 BENEATH BREASTS    • Culture Result 04/07/2021 Moderate growth usual skin felipe.*   • Gram Stain Result 04/07/2021                      Value:Rare Gram positive cocci.  Rare Gram positive rods.     • Culture Result 04/07/2021 *                    Value:Streptococcus agalactiae (Group B)  Rare growth  Group B Streptococci are susceptible to ampicillin,  penicillin and cefazolin but may be erythromycin and/or  clindamycin resistant.  Notify Microbiology if erythromycin  or clindamycin testing is required.     • Significant Indicator 04/07/2021 .    • Source 04/07/2021 WND    • Site 04/07/2021 BENEATH BREASTS    • Gram Stain Result 04/07/2021                      Value:Rare Gram positive cocci.  Rare Gram positive rods.     Office Visit on 06/05/2020   Component Date Value   • Glycohemoglobin 06/05/2020 8.4*   • Internal Control Negative 06/05/2020 Valid    • Internal Control Positive 06/05/2020 Valid    ]

## 2021-05-09 ENCOUNTER — PATIENT MESSAGE (OUTPATIENT)
Dept: MEDICAL GROUP | Facility: CLINIC | Age: 41
End: 2021-05-09

## 2021-05-09 DIAGNOSIS — F32.A DEPRESSION, UNSPECIFIED DEPRESSION TYPE: ICD-10-CM

## 2021-05-10 RX ORDER — ESCITALOPRAM OXALATE 10 MG/1
TABLET ORAL
Qty: 90 TABLET | Refills: 0 | Status: SHIPPED | OUTPATIENT
Start: 2021-05-10 | End: 2021-10-05 | Stop reason: SDUPTHER

## 2021-07-07 ENCOUNTER — HOSPITAL ENCOUNTER (OUTPATIENT)
Facility: MEDICAL CENTER | Age: 41
End: 2021-07-07
Attending: PHYSICIAN ASSISTANT
Payer: COMMERCIAL

## 2021-07-07 ENCOUNTER — OFFICE VISIT (OUTPATIENT)
Dept: MEDICAL GROUP | Facility: CLINIC | Age: 41
End: 2021-07-07
Payer: COMMERCIAL

## 2021-07-07 ENCOUNTER — NURSE TRIAGE (OUTPATIENT)
Dept: HEALTH INFORMATION MANAGEMENT | Facility: OTHER | Age: 41
End: 2021-07-07

## 2021-07-07 VITALS
HEIGHT: 64 IN | BODY MASS INDEX: 50.02 KG/M2 | TEMPERATURE: 97.4 F | WEIGHT: 293 LBS | HEART RATE: 96 BPM | RESPIRATION RATE: 16 BRPM | SYSTOLIC BLOOD PRESSURE: 122 MMHG | OXYGEN SATURATION: 94 % | DIASTOLIC BLOOD PRESSURE: 66 MMHG

## 2021-07-07 DIAGNOSIS — N39.41 URGE INCONTINENCE: ICD-10-CM

## 2021-07-07 DIAGNOSIS — E11.40 TYPE 2 DIABETES MELLITUS WITH DIABETIC NEUROPATHY, WITH LONG-TERM CURRENT USE OF INSULIN (HCC): ICD-10-CM

## 2021-07-07 DIAGNOSIS — N64.4 BREAST PAIN, LEFT: ICD-10-CM

## 2021-07-07 DIAGNOSIS — R26.2 WALKING DIFFICULTY DUE TO ANKLE AND FOOT: ICD-10-CM

## 2021-07-07 DIAGNOSIS — E03.9 ACQUIRED HYPOTHYROIDISM: ICD-10-CM

## 2021-07-07 DIAGNOSIS — G44.89 OTHER HEADACHE SYNDROME: ICD-10-CM

## 2021-07-07 DIAGNOSIS — E78.2 MIXED HYPERLIPIDEMIA: ICD-10-CM

## 2021-07-07 DIAGNOSIS — Z13.21 ENCOUNTER FOR VITAMIN DEFICIENCY SCREENING: ICD-10-CM

## 2021-07-07 DIAGNOSIS — I10 ESSENTIAL HYPERTENSION: ICD-10-CM

## 2021-07-07 DIAGNOSIS — G43.119 INTRACTABLE MIGRAINE WITH AURA WITHOUT STATUS MIGRAINOSUS: ICD-10-CM

## 2021-07-07 DIAGNOSIS — K92.1 BLOOD IN STOOL: ICD-10-CM

## 2021-07-07 DIAGNOSIS — Z79.4 TYPE 2 DIABETES MELLITUS WITH DIABETIC NEUROPATHY, WITH LONG-TERM CURRENT USE OF INSULIN (HCC): ICD-10-CM

## 2021-07-07 DIAGNOSIS — N63.0 BREAST LUMP: ICD-10-CM

## 2021-07-07 PROCEDURE — 99214 OFFICE O/P EST MOD 30 MIN: CPT | Performed by: PHYSICIAN ASSISTANT

## 2021-07-07 RX ORDER — IBUPROFEN 800 MG/1
800 TABLET ORAL EVERY 8 HOURS PRN
Qty: 30 TABLET | Refills: 0 | Status: SHIPPED | OUTPATIENT
Start: 2021-07-07 | End: 2021-07-29 | Stop reason: SDUPTHER

## 2021-07-07 RX ORDER — HYDROCORTISONE 25 MG/G
CREAM TOPICAL
Qty: 28 G | Refills: 2 | Status: SHIPPED | OUTPATIENT
Start: 2021-07-07 | End: 2021-07-19 | Stop reason: SDUPTHER

## 2021-07-07 NOTE — TELEPHONE ENCOUNTER
"Patient called scheduling to cancel her appt. But based on her current symptom she was sent to nurse advice.   Nurse advised her to keep scheduled appt.     Reason for Disposition  • [1] Normal formed BM AND [2] few streaks or drops of blood on surface of BM    Additional Information  • Rectal bleeding, bloody stools, or blood in stool (bowel movement)    Answer Assessment - Initial Assessment Questions  1. APPEARANCE of BLOOD: \"What color is it?\" \"Is it passed separately, on the surface of the stool, or mixed in with the stool?\"       Bright red blood on surface of stool  2. AMOUNT: \"How much blood was passed?\"      Just on the outside of the stool  3. FREQUENCY: \"How many times has blood been passed with the stools?\"       Twice in 2 days  4. ONSET: \"When was the blood first seen in the stools?\" (Days or weeks)       2 days ago  5. DIARRHEA: \"Is there also some diarrhea?\" If so, ask: \"How many diarrhea stools were passed in past 24 hours?\"       no  6. CONSTIPATION: \"Do you have constipation?\" If so, \"How bad is it?\"      no  7. RECURRENT SYMPTOMS: \"Have you had blood in your stools before?\" If so, ask: \"When was the last time?\" and \"What happened that time?\"       Yes, one year ago. Had colonoscopy and states no reason found   8. BLOOD THINNERS: \"Do you take any blood thinners?\" (e.g., Coumadin/warfarin, Pradaxa/dabigatran, aspirin)      Baby aspirin  9. OTHER SYMPTOMS: \"Do you have any other symptoms?\"  (e.g., abdominal pain, vomiting, dizziness, fever)      no  10. PREGNANCY: \"Is there any chance you are pregnant?\" \"When was your last menstrual period?\"        Not asked.    Protocols used: RECTAL BLEEDING-A-AH, STOOLS - UNUSUAL COLOR-A-AH      "

## 2021-07-07 NOTE — PROGRESS NOTES
"cc:  Blood in stool    Subjective:     Tarah Britton is a 41 y.o. female presenting for blood in stool      Patient presents to the office for blood in stool.  Patient states that she has had issues with this for years.  She states that she had a colonoscopy a couple of years ago and states that they did not find anything. Patient states that she is needing pullups for a couple of months for urinary incontinence. She is having urge incontinence.  She states that she is emptying her bladder when this occurs. She does report having a hysterectomy    Patient states that she has been having neck pain that has been causing \"migraines\" for her. She is wanting a medication for this but indicates that she has an enlarged heart.    Patient states that she is needing a handicap placard application filled out as she is unable to walk far distances due to ankle pain.      Patient has been having left sided breast pain.  Her mammogram from December shows a benign lymph node.      Patient is due for routine lab work. She does have type 2 diabetes, hypertension, hyperlipidemia, and hypothyroidism.    Review of systems:  See above.   Denies any symptoms unless previously indicated.        Current Outpatient Medications:   •  ibuprofen (MOTRIN) 800 MG Tab, Take 1 tablet by mouth every 8 hours as needed., Disp: 30 tablet, Rfl: 0  •  hydrocortisone rectal (ANUSOL-HC) 2.5% Cream, Apply a small amount to affected area twice a day no longer than 2 weeks, Disp: 28 g, Rfl: 2  •  Dulaglutide (TRULICITY) 1.5 MG/0.5ML Solution Pen-injector, Inject 0.5 mL under the skin every 7 days., Disp: 2 mL, Rfl: 0  •  pioglitazone (ACTOS) 30 MG Tab, TAKE ONE TABLET BY MOUTH DAILY, Disp: 30 tablet, Rfl: 1  •  escitalopram (LEXAPRO) 10 MG Tab, TAKE ONE TABLET BY MOUTH DAILY, Disp: 90 tablet, Rfl: 0  •  levothyroxine (SYNTHROID) 100 MCG Tab, TAKE ONE TABLET BY MOUTH EVERY MORNING ON AN EMPTY STOMACH, Disp: 90 tablet, Rfl: 0  •  atorvastatin (LIPITOR) 40 MG " "Tab, TAKE ONE TABLET BY MOUTH DAILY, Disp: 90 tablet, Rfl: 0  •  JARDIANCE 25 MG Tab, TAKE ONE TABLET BY MOUTH DAILY, Disp: 90 tablet, Rfl: 1  •  metFORMIN ER (GLUCOPHAGE XR) 500 MG TABLET SR 24 HR, TAKE TWO TABLETS BY MOUTH TWICE A DAY, Disp: 360 tablet, Rfl: 1  •  losartan (COZAAR) 25 MG Tab, TAKE TWO TABLETS BY MOUTH DAILY, Disp: 180 tablet, Rfl: 1  •  nystatin (MYCOSTATIN) powder, Apply to beneath breasts and to skin folds twice a day, Disp: 120 g, Rfl: 5  •  gabapentin (NEURONTIN) 300 MG Cap, Take 2 Capsules by mouth 2 times a day., Disp: 360 capsule, Rfl: 0  •  insulin lispro (HUMALOG) 100 UNIT/ML Solution Pen-injector injection PEN, Inject 11 Units under the skin 3 times a day before meals., Disp: 15 Each, Rfl: 1  •  sulfamethoxazole-trimethoprim (BACTRIM DS) 800-160 MG tablet, Take 1 tablet by mouth 2 times a day., Disp: 20 tablet, Rfl: 0  •  Insulin Degludec (TRESIBA FLEXTOUCH) 200 UNIT/ML Solution Pen-injector, Inject 70 Units under the skin at bedtime., Disp: 18 mL, Rfl: 0  •  fluconazole (DIFLUCAN) 150 MG tablet, Take one tab weekly for 2 weeks, Disp: 2 tablet, Rfl: 0  •  EYLEA 2 MG/0.05ML Solution, , Disp: , Rfl:   •  albuterol (PROVENTIL) 2.5mg/3ml Nebu Soln solution for nebulization, 3 mL by Nebulization route every four hours as needed for Shortness of Breath., Disp: 100 Bullet, Rfl: 3  •  Insulin Pen Needle 32G X 4 MM Misc, 1 Each by Does not apply route 4 times a day., Disp: 120 Each, Rfl: 11    Allergies, past medical history, past surgical history, family history, social history reviewed and updated    Objective:     Vitals: /66   Pulse 96   Temp 36.3 °C (97.4 °F) (Temporal)   Resp 16   Ht 1.626 m (5' 4\")   Wt (!) 145 kg (319 lb)   SpO2 94%   BMI 54.76 kg/m²   General: Alert, pleasant, NAD  EYES:   PERRL, EOMI, no icterus or pallor.  Conjunctivae and lids normal.   HENT:  Normocephalic.  External ears normal.   Neck supple.     Respiratory: Normal respiratory effort.    Abdomen: " obese  Skin: Warm, dry, no rashes.  Musculoskeletal: Gait is normal.  Moves all extremities well.    Rectum: Unable to do fecal testing. Possible hemorrhoid inferiorly located. Possible pruritus as there is erythema around the anus.  Neurological: No tremors, sensation grossly intact, gait is normal, CN2-12 intact.  Psych:  Affect/mood is normal, judgement is fair,  memory is intact, grooming is appropriate.    Assessment/Plan:     Tarah was seen today for bloody stools.    Diagnoses and all orders for this visit:    Urge incontinence  -     REFERRAL TO UROLOGY  -     URINALYSIS,CULTURE IF INDICATED; Future    Patient is losing full bladder control. Will refer to urology for further evaluation.    Breast lump  -     US-BREAST LIMITED-LEFT; Future  -     MA DIAGNOSTIC MAMMO LEFT W/CAD; Future  Breast pain, left  -     US-BREAST LIMITED-LEFT; Future  -     MA DIAGNOSTIC MAMMO LEFT W/CAD; Future    Mammogram was benign but patient is having symptoms. Will obtain a diagnostic mammogram with breast ultrasound.    Blood in stool  -     REFERRAL TO GASTROENTEROLOGY  -     hydrocortisone rectal (ANUSOL-HC) 2.5% Cream; Apply a small amount to affected area twice a day no longer than 2 weeks    Possible hemorrhoid. Will refer to gastroenterology for further evaluation. We will have patient try a hydrocortisone Anusol cream as well.    Type 2 diabetes mellitus with diabetic neuropathy, with long-term current use of insulin ([facility])  -     Lipid Profile; Future  -     Comp Metabolic Panel; Future  -     HEMOGLOBIN A1C; Future  -     URINALYSIS,CULTURE IF INDICATED; Future  -     MICROALBUMIN CREAT RATIO URINE; Future  Essential hypertension  -     Lipid Profile; Future  -     Comp Metabolic Panel; Future  Mixed hyperlipidemia  -     Lipid Profile; Future  -     Comp Metabolic Panel; Future  Acquired hypothyroidism  -     Lipid Profile; Future  -     Comp Metabolic Panel; Future  -     TSH; Future  -     FREE THYROXINE;  Future  Encounter for vitamin deficiency screening  -     VITAMIN D,25 HYDROXY; Future    Labs have been ordered to evaluate further. The plan will be to follow-up in 1 to 2 weeks.    Intractable migraine with aura without status migrainosus  Other headache syndrome  -     ibuprofen (MOTRIN) 800 MG Tab; Take 1 tablet by mouth every 8 hours as needed.    We did not have a chance to evaluate this further. Patient states that ibuprofen has helped in the past. We will provide a prescription at this time and follow-up in 1 to 2 weeks for further evaluation. An echo may be needed to evaluate her heart    Walking difficulty due to ankle and foot    Handicap placard application filled out.        Return in about 1 week (around 7/14/2021), or if symptoms worsen or fail to improve, for follow up headaches and tests..    Please note that this dictation was created using voice recognition software. I have made every reasonable attempt to correct obvious errors, but expect that there are errors of grammar and possible content that I did not discover before finalizing note.

## 2021-07-08 DIAGNOSIS — E11.40 TYPE 2 DIABETES MELLITUS WITH DIABETIC NEUROPATHY, WITH LONG-TERM CURRENT USE OF INSULIN (HCC): ICD-10-CM

## 2021-07-08 DIAGNOSIS — Z79.4 TYPE 2 DIABETES MELLITUS WITH DIABETIC NEUROPATHY, WITH LONG-TERM CURRENT USE OF INSULIN (HCC): ICD-10-CM

## 2021-07-08 DIAGNOSIS — Z13.21 ENCOUNTER FOR VITAMIN DEFICIENCY SCREENING: ICD-10-CM

## 2021-07-08 DIAGNOSIS — E78.2 MIXED HYPERLIPIDEMIA: ICD-10-CM

## 2021-07-08 DIAGNOSIS — N39.41 URGE INCONTINENCE: ICD-10-CM

## 2021-07-08 DIAGNOSIS — I10 ESSENTIAL HYPERTENSION: ICD-10-CM

## 2021-07-08 DIAGNOSIS — E03.9 ACQUIRED HYPOTHYROIDISM: ICD-10-CM

## 2021-07-08 LAB
FORWARD REASON: SPWHY: NORMAL
FORWARDED TO LAB: SPWHR: NORMAL
SPECIMEN SENT (2ND): SPWT2: NORMAL
SPECIMEN SENT (3RD): SPWT3: NORMAL
SPECIMEN SENT (4TH): SPWT4: NORMAL
SPECIMEN SENT: SPWT1: NORMAL

## 2021-07-12 LAB
25(OH)D3+25(OH)D2 SERPL-MCNC: 19.4 NG/ML (ref 30–100)
ALBUMIN SERPL-MCNC: 3.6 G/DL (ref 3.8–4.8)
ALBUMIN/CREAT UR: 427 MG/G CREAT (ref 0–29)
ALBUMIN/GLOB SERPL: 1.1 {RATIO} (ref 1.2–2.2)
ALP SERPL-CCNC: 110 IU/L (ref 48–121)
ALT SERPL-CCNC: 25 IU/L (ref 0–32)
AMBIG ABBREV CMP14 DFLT   977206: NORMAL
AMBIG ABBREV LP  977212: NORMAL
APPEARANCE UR: ABNORMAL
AST SERPL-CCNC: 18 IU/L (ref 0–40)
BACTERIA #/AREA URNS HPF: ABNORMAL /[HPF]
BACTERIA UR CULT: ABNORMAL
BILIRUB SERPL-MCNC: 0.2 MG/DL (ref 0–1.2)
BILIRUB UR QL STRIP: NEGATIVE
BUN SERPL-MCNC: 22 MG/DL (ref 6–24)
BUN/CREAT SERPL: 26 (ref 9–23)
CALCIUM SERPL-MCNC: 9.5 MG/DL (ref 8.7–10.2)
CASTS URNS MICRO: ABNORMAL
CASTS URNS QL MICRO: ABNORMAL /LPF
CHLORIDE SERPL-SCNC: 102 MMOL/L (ref 96–106)
CHOLEST SERPL-MCNC: 174 MG/DL (ref 100–199)
CO2 SERPL-SCNC: 22 MMOL/L (ref 20–29)
COLOR UR: YELLOW
CREAT SERPL-MCNC: 0.86 MG/DL (ref 0.57–1)
CREAT UR-MCNC: 71.6 MG/DL
CRYSTALS URNS MICRO: ABNORMAL
EPI CELLS #/AREA URNS HPF: >10 /HPF (ref 0–10)
GLOBULIN SER CALC-MCNC: 3.2 G/DL (ref 1.5–4.5)
GLUCOSE SERPL-MCNC: 77 MG/DL (ref 65–99)
GLUCOSE UR QL: ABNORMAL
HBA1C MFR BLD: 12 % (ref 4.8–5.6)
HDLC SERPL-MCNC: 38 MG/DL
HGB UR QL STRIP: ABNORMAL
KETONES UR QL STRIP: NEGATIVE
LABORATORY COMMENT REPORT: ABNORMAL
LDLC SERPL CALC-MCNC: 87 MG/DL (ref 0–99)
LEUKOCYTE ESTERASE UR QL STRIP: ABNORMAL
MICRO URNS: ABNORMAL
MICRO URNS: ABNORMAL
MICROALBUMIN UR-MCNC: 305.4 UG/ML
MUCOUS THREADS URNS QL MICRO: PRESENT
NITRITE UR QL STRIP: NEGATIVE
OTHER ANTIBIOTIC SUSC ISLT: ABNORMAL
PH UR STRIP: 6 [PH] (ref 5–7.5)
POTASSIUM SERPL-SCNC: 4.5 MMOL/L (ref 3.5–5.2)
PROT SERPL-MCNC: 6.8 G/DL (ref 6–8.5)
PROT UR QL STRIP: ABNORMAL
RBC #/AREA URNS HPF: ABNORMAL /HPF (ref 0–2)
RENAL EPI CELLS #/AREA URNS HPF: ABNORMAL /[HPF]
SODIUM SERPL-SCNC: 138 MMOL/L (ref 134–144)
SP GR UR: >=1.03 (ref 1–1.03)
T VAGINALIS URNS QL MICRO: ABNORMAL
T4 FREE SERPL-MCNC: 2.41 NG/DL (ref 0.82–1.77)
TRIGL SERPL-MCNC: 299 MG/DL (ref 0–149)
TSH SERPL DL<=0.005 MIU/L-ACNC: 2.21 UIU/ML (ref 0.45–4.5)
UNIDENT CRYS URNS QL MICRO: ABNORMAL
URINALYSIS REFLEX  377202: ABNORMAL
URNS CMNT MICRO: ABNORMAL
UROBILINOGEN UR STRIP-MCNC: 0.2 MG/DL (ref 0.2–1)
VLDLC SERPL CALC-MCNC: 49 MG/DL (ref 5–40)
WBC #/AREA URNS HPF: >30 /HPF (ref 0–5)
YEAST #/AREA URNS HPF: PRESENT /[HPF]

## 2021-07-12 RX ORDER — NITROFURANTOIN 25; 75 MG/1; MG/1
100 CAPSULE ORAL 2 TIMES DAILY
Qty: 14 CAPSULE | Refills: 0 | Status: SHIPPED | OUTPATIENT
Start: 2021-07-12 | End: 2021-07-19 | Stop reason: SDUPTHER

## 2021-07-19 ENCOUNTER — OFFICE VISIT (OUTPATIENT)
Dept: MEDICAL GROUP | Facility: CLINIC | Age: 41
End: 2021-07-19
Payer: COMMERCIAL

## 2021-07-19 ENCOUNTER — HOSPITAL ENCOUNTER (OUTPATIENT)
Facility: MEDICAL CENTER | Age: 41
End: 2021-07-19
Attending: PHYSICIAN ASSISTANT
Payer: COMMERCIAL

## 2021-07-19 VITALS
HEART RATE: 105 BPM | BODY MASS INDEX: 50.02 KG/M2 | TEMPERATURE: 98.7 F | HEIGHT: 64 IN | WEIGHT: 293 LBS | OXYGEN SATURATION: 97 % | DIASTOLIC BLOOD PRESSURE: 68 MMHG | SYSTOLIC BLOOD PRESSURE: 120 MMHG

## 2021-07-19 DIAGNOSIS — E78.2 MIXED HYPERLIPIDEMIA: ICD-10-CM

## 2021-07-19 DIAGNOSIS — Z79.4 TYPE 2 DIABETES MELLITUS WITH DIABETIC NEUROPATHY, WITH LONG-TERM CURRENT USE OF INSULIN (HCC): ICD-10-CM

## 2021-07-19 DIAGNOSIS — I10 ESSENTIAL HYPERTENSION: ICD-10-CM

## 2021-07-19 DIAGNOSIS — E11.40 TYPE 2 DIABETES MELLITUS WITH DIABETIC NEUROPATHY, WITH LONG-TERM CURRENT USE OF INSULIN (HCC): ICD-10-CM

## 2021-07-19 DIAGNOSIS — E55.9 VITAMIN D DEFICIENCY: ICD-10-CM

## 2021-07-19 DIAGNOSIS — R07.89 OTHER CHEST PAIN: ICD-10-CM

## 2021-07-19 DIAGNOSIS — K92.1 BLOOD IN STOOL: ICD-10-CM

## 2021-07-19 DIAGNOSIS — E03.9 ACQUIRED HYPOTHYROIDISM: ICD-10-CM

## 2021-07-19 DIAGNOSIS — E66.01 MORBID OBESITY WITH BMI OF 50.0-59.9, ADULT (HCC): ICD-10-CM

## 2021-07-19 DIAGNOSIS — N30.01 ACUTE CYSTITIS WITH HEMATURIA: ICD-10-CM

## 2021-07-19 LAB
APPEARANCE UR: NORMAL
BILIRUB UR STRIP-MCNC: NORMAL MG/DL
COLOR UR AUTO: YELLOW
FORWARD REASON: SPWHY: NORMAL
FORWARDED TO LAB: SPWHR: NORMAL
GLUCOSE UR STRIP.AUTO-MCNC: 500 MG/DL
KETONES UR STRIP.AUTO-MCNC: NORMAL MG/DL
LEUKOCYTE ESTERASE UR QL STRIP.AUTO: NORMAL
NITRITE UR QL STRIP.AUTO: NORMAL
PH UR STRIP.AUTO: 5.5 [PH] (ref 5–8)
PROT UR QL STRIP: NORMAL MG/DL
RBC UR QL AUTO: NORMAL
SP GR UR STRIP.AUTO: 1
SPECIMEN SENT: SPWT1: NORMAL
UROBILINOGEN UR STRIP-MCNC: 0.2 MG/DL

## 2021-07-19 PROCEDURE — 81002 URINALYSIS NONAUTO W/O SCOPE: CPT | Performed by: PHYSICIAN ASSISTANT

## 2021-07-19 PROCEDURE — 99214 OFFICE O/P EST MOD 30 MIN: CPT | Performed by: PHYSICIAN ASSISTANT

## 2021-07-19 RX ORDER — CHOLECALCIFEROL (VITAMIN D3) 125 MCG
1 CAPSULE ORAL DAILY
Qty: 30 CAPSULE | Refills: 3 | Status: SHIPPED
Start: 2021-07-19 | End: 2021-09-09

## 2021-07-19 RX ORDER — HYDROCORTISONE 25 MG/G
CREAM TOPICAL
Qty: 28 G | Refills: 2 | Status: SHIPPED | OUTPATIENT
Start: 2021-07-19 | End: 2021-10-10 | Stop reason: SDUPTHER

## 2021-07-19 RX ORDER — NITROFURANTOIN 25; 75 MG/1; MG/1
100 CAPSULE ORAL 2 TIMES DAILY
Qty: 14 CAPSULE | Refills: 0 | Status: SHIPPED | OUTPATIENT
Start: 2021-07-19 | End: 2021-07-26

## 2021-07-19 NOTE — PROGRESS NOTES
cc: Follow-up    Subjective:     Tarah Britton is a 41 y.o. female presenting for follow-up    Patient presents to the office for follow-up.  Patient states that she has been drinking lots of water.     She presents the office today to follow-up with her lab work.  Currently her diabetes is not controlled as her A1c is 10.  She does have type 2 diabetes with neuropathy, mixed hyperlipidemia, hypertension and hypothyroidism.  Her most recent labs also show that she is vitamin D deficient.  Patient states that she is taking an over-the-counter vitamin D supplement.    Patient has been dealing with urinary tract infection and states that she took her last antibiotic dose this morning.  She is following up with her urine as well.    Patient indicates that she is still having rectal bleeding and pain.  She is requesting a refill of her Anusol medication.  She has not made an appointment with GI at this time but states that they have contacted her and she will call them back to schedule.    Patient acknowledges that she continues to have intermittent chest pain.  She had an echo ordered several months ago but indicates that she has not had this done.  She is wanting the order to be sent to [place].    Patient has an appointment to see ophthamology for her eyes next month.      Review of systems:  See above.   Denies any symptoms unless previously indicated.        Current Outpatient Medications:   •  Cholecalciferol (VITAMIN D) 125 MCG (5000 UT) Cap, Take 1 capsule by mouth every day., Disp: 30 capsule, Rfl: 3  •  nitrofurantoin (MACROBID) 100 MG Cap, Take 1 capsule by mouth 2 times a day for 7 days., Disp: 14 capsule, Rfl: 0  •  hydrocortisone rectal (ANUSOL-HC) 2.5% Cream, Apply a small amount to affected area twice a day no longer than 2 weeks, Disp: 28 g, Rfl: 2  •  pioglitazone (ACTOS) 30 MG Tab, TAKE ONE TABLET BY MOUTH DAILY, Disp: 30 tablet, Rfl: 2  •  ibuprofen (MOTRIN) 800 MG Tab, Take 1 tablet by mouth  "every 8 hours as needed., Disp: 30 tablet, Rfl: 0  •  Dulaglutide (TRULICITY) 1.5 MG/0.5ML Solution Pen-injector, Inject 0.5 mL under the skin every 7 days., Disp: 2 mL, Rfl: 0  •  escitalopram (LEXAPRO) 10 MG Tab, TAKE ONE TABLET BY MOUTH DAILY, Disp: 90 tablet, Rfl: 0  •  levothyroxine (SYNTHROID) 100 MCG Tab, TAKE ONE TABLET BY MOUTH EVERY MORNING ON AN EMPTY STOMACH, Disp: 90 tablet, Rfl: 0  •  atorvastatin (LIPITOR) 40 MG Tab, TAKE ONE TABLET BY MOUTH DAILY, Disp: 90 tablet, Rfl: 0  •  JARDIANCE 25 MG Tab, TAKE ONE TABLET BY MOUTH DAILY, Disp: 90 tablet, Rfl: 1  •  losartan (COZAAR) 25 MG Tab, TAKE TWO TABLETS BY MOUTH DAILY, Disp: 180 tablet, Rfl: 1  •  nystatin (MYCOSTATIN) powder, Apply to beneath breasts and to skin folds twice a day, Disp: 120 g, Rfl: 5  •  gabapentin (NEURONTIN) 300 MG Cap, Take 2 Capsules by mouth 2 times a day., Disp: 360 capsule, Rfl: 0  •  insulin lispro (HUMALOG) 100 UNIT/ML Solution Pen-injector injection PEN, Inject 11 Units under the skin 3 times a day before meals., Disp: 15 Each, Rfl: 1  •  Insulin Degludec (TRESIBA FLEXTOUCH) 200 UNIT/ML Solution Pen-injector, Inject 70 Units under the skin at bedtime., Disp: 18 mL, Rfl: 0  •  EYLEA 2 MG/0.05ML Solution, , Disp: , Rfl:   •  albuterol (PROVENTIL) 2.5mg/3ml Nebu Soln solution for nebulization, 3 mL by Nebulization route every four hours as needed for Shortness of Breath., Disp: 100 Bullet, Rfl: 3  •  Insulin Pen Needle 32G X 4 MM Misc, 1 Each by Does not apply route 4 times a day., Disp: 120 Each, Rfl: 11  •  metFORMIN ER (GLUCOPHAGE XR) 500 MG TABLET SR 24 HR, TAKE TWO TABLETS BY MOUTH TWICE A DAY, Disp: 360 tablet, Rfl: 1    Allergies, past medical history, past surgical history, family history, social history reviewed and updated    Objective:     Vitals: /68 (BP Location: Left arm, Patient Position: Sitting, BP Cuff Size: Large adult)   Pulse (!) 105   Temp 37.1 °C (98.7 °F) (Temporal)   Ht 1.626 m (5' 4\")   Wt (!) " 145 kg (320 lb)   SpO2 97%   BMI 54.93 kg/m²   General: Alert, pleasant, NAD  EYES:   PERRL, EOMI, no icterus or pallor.  Conjunctivae and lids normal.   HENT:  Normocephalic.  External ears normal.   Neck supple.     Respiratory: Normal respiratory effort.    Abdomen:  obese  Skin: Warm, dry, no rashes.  Musculoskeletal: Gait is normal.  Moves all extremities well.    Extremities: normal range of motion all extremities.   Neurological: No tremors, sensation grossly intact,  CN2-12 intact.  Psych:  Affect/mood is normal, judgement is good, memory is intact, grooming is appropriate.    Assessment/Plan:     Tarah was seen today for lab results.    Diagnoses and all orders for this visit:    Type 2 diabetes mellitus with diabetic neuropathy, with long-term current use of insulin (HCC)  -     REFERRAL TO ENDOCRINOLOGY  -     REFERRAL TO NUTRITION SERVICES  -     Comp Metabolic Panel; Future  Mixed hyperlipidemia  -     REFERRAL TO ENDOCRINOLOGY  -     REFERRAL TO NUTRITION SERVICES  -     Comp Metabolic Panel; Future  Essential hypertension  Acquired hypothyroidism  -     REFERRAL TO ENDOCRINOLOGY  Morbid obesity with BMI of 50.0-59.9, adult (HCC)    Patient is eating a poor diet.  She indicates eating foods with a lot of sugar.  Although she has been to a dietitian in the past, I think she will benefit by seeing them again.  Her diabetes is also out of control.  At this point I believe that she does need the help of a specialist and will submit a referral to endocrinology as well as a nutritionist at this time.  We will repeat a metabolic panel in 1 month.  Patient also has an upcoming appointment with ophthalmology for further evaluation.    Other chest pain    Not controlled.  Advised patient to call Jasvir Ibarra to schedule her echo.    Vitamin D deficiency  -     Cholecalciferol (VITAMIN D) 125 MCG (5000 UT) Cap; Take 1 capsule by mouth every day.    Not controlled.  We will submit a prescription for patient to  take a vitamin D supplement 5000 units on a daily basis.  We will need to recheck labs in approximately 3 to 6 months.    Blood in stool  -     hydrocortisone rectal (ANUSOL-HC) 2.5% Cream; Apply a small amount to affected area twice a day no longer than 2 weeks    Not controlled.  We will renew hydrocortisone cream and I have advised patient that she does need to make an appointment with gastroenterology.    Acute cystitis with hematuria  -     URINE CULTURE(NEW)  -     nitrofurantoin (MACROBID) 100 MG Cap; Take 1 capsule by mouth 2 times a day for 7 days.  -     URINALYSIS,CULTURE IF INDICATED; Future    Not improved.  We will send urine for culture and we will refill nitrofurantoin at this time.        Return in about 4 weeks (around 8/16/2021), or if symptoms worsen or fail to improve.    Please note that this dictation was created using voice recognition software. I have made every reasonable attempt to correct obvious errors, but expect that there are errors of grammar and possible content that I did not discover before finalizing note.

## 2021-07-20 ENCOUNTER — PATIENT MESSAGE (OUTPATIENT)
Dept: MEDICAL GROUP | Facility: CLINIC | Age: 41
End: 2021-07-20

## 2021-07-20 DIAGNOSIS — E11.40 TYPE 2 DIABETES MELLITUS WITH DIABETIC NEUROPATHY, WITH LONG-TERM CURRENT USE OF INSULIN (HCC): ICD-10-CM

## 2021-07-20 DIAGNOSIS — Z79.4 TYPE 2 DIABETES MELLITUS WITH DIABETIC NEUROPATHY, WITH LONG-TERM CURRENT USE OF INSULIN (HCC): ICD-10-CM

## 2021-07-25 DIAGNOSIS — Z79.4 TYPE 2 DIABETES MELLITUS WITH DIABETIC NEUROPATHY, WITH LONG-TERM CURRENT USE OF INSULIN (HCC): ICD-10-CM

## 2021-07-25 DIAGNOSIS — E11.40 TYPE 2 DIABETES MELLITUS WITH DIABETIC NEUROPATHY, WITH LONG-TERM CURRENT USE OF INSULIN (HCC): ICD-10-CM

## 2021-07-26 DIAGNOSIS — N30.00 ACUTE CYSTITIS WITHOUT HEMATURIA: ICD-10-CM

## 2021-07-26 RX ORDER — AMOXICILLIN AND CLAVULANATE POTASSIUM 875; 125 MG/1; MG/1
1 TABLET, FILM COATED ORAL 2 TIMES DAILY
Qty: 14 TABLET | Refills: 0 | Status: SHIPPED
Start: 2021-07-26 | End: 2021-12-08

## 2021-07-26 RX ORDER — GABAPENTIN 300 MG/1
CAPSULE ORAL
Qty: 360 CAPSULE | Refills: 0 | Status: SHIPPED | OUTPATIENT
Start: 2021-07-26 | End: 2021-10-20

## 2021-07-26 NOTE — TELEPHONE ENCOUNTER
Was the patient seen in the last year in this department? Yes   LOV 07/19/2021    Does patient have an active prescription for medications requested? Yes    Received Request Via: Pharmacy    Hospital Outpatient Visit on 07/19/2021   Component Date Value   • Forwarded to Lab: 07/19/2021 [facility]    • Forward Reason: 07/19/2021 Insurance    • Specimen Sent: 07/19/2021 Urine    Office Visit on 07/19/2021   Component Date Value   • POC Color 07/19/2021 yellow    • POC Appearance 07/19/2021 cloudy    • POC Leukocyte Esterase 07/19/2021 small    • POC Nitrites 07/19/2021 neg    • POC Urobiligen 07/19/2021 0.2    • POC Protein 07/19/2021 neg    • POC Urine PH 07/19/2021 5.5    • POC Blood 07/19/2021 trace    • POC Specific Gravity 07/19/2021 1.005    • POC Ketones 07/19/2021 neg    • POC Bilirubin 07/19/2021 neg    • POC Glucose 07/19/2021 500    Orders Only on 07/07/2021   Component Date Value   • Glucose 07/07/2021 77    • Bun 07/07/2021 22    • Creatinine 07/07/2021 0.86    • GFR If Non  Ameri* 07/07/2021 84    • GFR If  07/07/2021 96    • Bun-Creatinine Ratio 07/07/2021 26*   • Sodium 07/07/2021 138    • Potassium 07/07/2021 4.5    • Chloride 07/07/2021 102    • Co2 07/07/2021 22    • Calcium 07/07/2021 9.5    • Total Protein 07/07/2021 6.8    • Albumin 07/07/2021 3.6*   • Globulin 07/07/2021 3.2    • A-G Ratio 07/07/2021 1.1*   • Total Bilirubin 07/07/2021 0.2    • Alkaline Phosphatase 07/07/2021 110    • AST(SGOT) 07/07/2021 18    • ALT(SGPT) 07/07/2021 25    • Specific Gravity 07/07/2021 >=1.030*   • Ph 07/07/2021 6.0    • Color 07/07/2021 Yellow    • Character 07/07/2021 Turbid*   • Leukocyte Esterase 07/07/2021 2+*   • Protein 07/07/2021 2+*   • Glucose 07/07/2021 3+*   • Ketones 07/07/2021 Negative    • Occult Blood 07/07/2021 1+*   • Bilirubin 07/07/2021 Negative    • Urobilinogen Semi Qnt 07/07/2021 0.2    • Nitrite 07/07/2021 Negative    • Microscopic Exam 07/07/2021 See below:    •  Microscopic Exam 07/07/2021 CANCELED    • Urinalysis Reflex 07/07/2021 Comment    • WBC 07/07/2021 >30*   • RBC 07/07/2021 3-10*   • Epithelial Cells Non Ovidio* 07/07/2021 >10*   • Epithelial Cells Renal 07/07/2021 CANCELED    • Urine Casts 07/07/2021 None seen    • Cast Type 07/07/2021 CANCELED    • Urine Crystals 07/07/2021 CANCELED    • Crystal Type 07/07/2021 CANCELED    • Mucous Threads 07/07/2021 Present    • Bacteria 07/07/2021 Many*   • Yeast Cells 07/07/2021 Present*   • Trichomonas 07/07/2021 CANCELED    • Comment 07/07/2021 CANCELED    • Urine Culture 07/07/2021 Final report*   • Result 1 07/07/2021 Escherichia coli*   • Result 2 07/07/2021 Comment    • Susceptibility 07/07/2021 Comment    • Cholesterol,Tot 07/07/2021 174    • Triglycerides 07/07/2021 299*   • HDL 07/07/2021 38*   • VLDL Cholesterol Calc 07/07/2021 49*   • LDL Chol Calc ([facility]) 07/07/2021 87    • Comment: 07/07/2021 CANCELED    • Creatinine, Random Urine 07/07/2021 71.6    • Microalbumin, Urine Rand* 07/07/2021 305.4    • Albumin / Creatinine Rat* 07/07/2021 427*   • Glycohemoglobin 07/07/2021 12.0*   • TSH 07/07/2021 2.210    • Free T-4 07/07/2021 2.41*   • 25-Hydroxy   Vitamin D 25 07/07/2021 19.4*   • Paul Peters CMP14 [place]* 07/07/2021 Comment    • Paul Peters LP Default 07/07/2021 Comment    Hospital Outpatient Visit on 07/07/2021   Component Date Value   • Forwarded to Lab: 07/08/2021 [facility]    • Forward Reason: 07/08/2021 Insurance    • Specimen Sent: 07/08/2021 Urine    • Specimen Sent (2nd): 07/08/2021 Blood    • Specimen Sent (3rd): 07/08/2021 Blood    • Specimen Sent (4th): 07/08/2021 Blood    Hospital Outpatient Visit on 04/07/2021   Component Date Value   • Significant Indicator 04/07/2021 NEG    • Source 04/07/2021 WND    • Site 04/07/2021 BENEATH BREASTS    • Culture Result 04/07/2021 No Anaerobes isolated.    • Significant Indicator 04/07/2021 POS*   • Source 04/07/2021 WND    • Site 04/07/2021 BENEATH BREASTS    • Culture  Result 04/07/2021 Moderate growth usual skin felipe.*   • Gram Stain Result 04/07/2021                      Value:Rare Gram positive cocci.  Rare Gram positive rods.     • Culture Result 04/07/2021 *                    Value:Streptococcus agalactiae (Group B)  Rare growth  Group B Streptococci are susceptible to ampicillin,  penicillin and cefazolin but may be erythromycin and/or  clindamycin resistant.  Notify Microbiology if erythromycin  or clindamycin testing is required.     • Significant Indicator 04/07/2021 .    • Source 04/07/2021 WND    • Site 04/07/2021 BENEATH BREASTS    • Gram Stain Result 04/07/2021                      Value:Rare Gram positive cocci.  Rare Gram positive rods.     ]

## 2021-07-26 NOTE — PROGRESS NOTES
Please notify patient that she has a urinary tract infection.  It appears that she is resistant to a lot of antibiotics and so I am also referring her to urology.  I need for her to take the Augmentin I am sending to her pharmacy and finish it completely.  If she is allergic to penicillins, please let me know.  I do not see it in her list.

## 2021-07-27 NOTE — PROGRESS NOTES
Pt states she has finished her 2nd bottle of antibiotics but will be starting her new rx that has been sent to her pharmacy today. She also stated that she is not allergic to PCN.    2

## 2021-07-29 DIAGNOSIS — G44.89 OTHER HEADACHE SYNDROME: ICD-10-CM

## 2021-07-29 DIAGNOSIS — I10 ESSENTIAL HYPERTENSION: ICD-10-CM

## 2021-07-29 RX ORDER — PIOGLITAZONEHYDROCHLORIDE 30 MG/1
TABLET ORAL
Qty: 30 TABLET | Refills: 2 | Status: CANCELLED | OUTPATIENT
Start: 2021-07-29

## 2021-07-29 RX ORDER — IBUPROFEN 800 MG/1
800 TABLET ORAL EVERY 8 HOURS PRN
Qty: 30 TABLET | Refills: 0 | Status: SHIPPED | OUTPATIENT
Start: 2021-07-29 | End: 2021-08-01 | Stop reason: SDUPTHER

## 2021-07-29 NOTE — TELEPHONE ENCOUNTER
Was the patient seen in the last year in this department? 7/19/21    Does patient have an active prescription for medications requested? Yes    Received Request Via: Pharmacy    Hospital Outpatient Visit on 07/19/2021   Component Date Value   • Forwarded to Lab: 07/19/2021 [facility]    • Forward Reason: 07/19/2021 Insurance    • Specimen Sent: 07/19/2021 Urine    Office Visit on 07/19/2021   Component Date Value   • POC Color 07/19/2021 yellow    • POC Appearance 07/19/2021 cloudy    • POC Leukocyte Esterase 07/19/2021 small    • POC Nitrites 07/19/2021 neg    • POC Urobiligen 07/19/2021 0.2    • POC Protein 07/19/2021 neg    • POC Urine PH 07/19/2021 5.5    • POC Blood 07/19/2021 trace    • POC Specific Gravity 07/19/2021 1.005    • POC Ketones 07/19/2021 neg    • POC Bilirubin 07/19/2021 neg    • POC Glucose 07/19/2021 500    Orders Only on 07/07/2021   Component Date Value   • Glucose 07/07/2021 77    • Bun 07/07/2021 22    • Creatinine 07/07/2021 0.86    • GFR If Non  Ameri* 07/07/2021 84    • GFR If  07/07/2021 96    • Bun-Creatinine Ratio 07/07/2021 26*   • Sodium 07/07/2021 138    • Potassium 07/07/2021 4.5    • Chloride 07/07/2021 102    • Co2 07/07/2021 22    • Calcium 07/07/2021 9.5    • Total Protein 07/07/2021 6.8    • Albumin 07/07/2021 3.6*   • Globulin 07/07/2021 3.2    • A-G Ratio 07/07/2021 1.1*   • Total Bilirubin 07/07/2021 0.2    • Alkaline Phosphatase 07/07/2021 110    • AST(SGOT) 07/07/2021 18    • ALT(SGPT) 07/07/2021 25    • Specific Gravity 07/07/2021 >=1.030*   • Ph 07/07/2021 6.0    • Color 07/07/2021 Yellow    • Character 07/07/2021 Turbid*   • Leukocyte Esterase 07/07/2021 2+*   • Protein 07/07/2021 2+*   • Glucose 07/07/2021 3+*   • Ketones 07/07/2021 Negative    • Occult Blood 07/07/2021 1+*   • Bilirubin 07/07/2021 Negative    • Urobilinogen Semi Qnt 07/07/2021 0.2    • Nitrite 07/07/2021 Negative    • Microscopic Exam 07/07/2021 See below:    • Microscopic Exam  07/07/2021 CANCELED    • Urinalysis Reflex 07/07/2021 Comment    • WBC 07/07/2021 >30*   • RBC 07/07/2021 3-10*   • Epithelial Cells Non Ovidio* 07/07/2021 >10*   • Epithelial Cells Renal 07/07/2021 CANCELED    • Urine Casts 07/07/2021 None seen    • Cast Type 07/07/2021 CANCELED    • Urine Crystals 07/07/2021 CANCELED    • Crystal Type 07/07/2021 CANCELED    • Mucous Threads 07/07/2021 Present    • Bacteria 07/07/2021 Many*   • Yeast Cells 07/07/2021 Present*   • Trichomonas 07/07/2021 CANCELED    • Comment 07/07/2021 CANCELED    • Urine Culture 07/07/2021 Final report*   • Result 1 07/07/2021 Escherichia coli*   • Result 2 07/07/2021 Comment    • Susceptibility 07/07/2021 Comment    • Cholesterol,Tot 07/07/2021 174    • Triglycerides 07/07/2021 299*   • HDL 07/07/2021 38*   • VLDL Cholesterol Calc 07/07/2021 49*   • LDL Chol Calc ([facility]) 07/07/2021 87    • Comment: 07/07/2021 CANCELED    • Creatinine, Random Urine 07/07/2021 71.6    • Microalbumin, Urine Rand* 07/07/2021 305.4    • Albumin / Creatinine Rat* 07/07/2021 427*   • Glycohemoglobin 07/07/2021 12.0*   • TSH 07/07/2021 2.210    • Free T-4 07/07/2021 2.41*   • 25-Hydroxy   Vitamin D 25 07/07/2021 19.4*   • Paul Peters [id number] [place]* 07/07/2021 Comment    • Paul Peters LP Default 07/07/2021 Comment    Hospital Outpatient Visit on 07/07/2021   Component Date Value   • Forwarded to Lab: 07/08/2021 [facility]    • Forward Reason: 07/08/2021 Insurance    • Specimen Sent: 07/08/2021 Urine    • Specimen Sent (2nd): 07/08/2021 Blood    • Specimen Sent (3rd): 07/08/2021 Blood    • Specimen Sent (4th): 07/08/2021 Blood    Hospital Outpatient Visit on 04/07/2021   Component Date Value   • Significant Indicator 04/07/2021 NEG    • Source 04/07/2021 WND    • Site 04/07/2021 BENEATH BREASTS    • Culture Result 04/07/2021 No Anaerobes isolated.    • Significant Indicator 04/07/2021 POS*   • Source 04/07/2021 WND    • Site 04/07/2021 BENEATH BREASTS    • Culture Result 04/07/2021  Moderate growth usual skin felipe.*   • Gram Stain Result 04/07/2021                      Value:Rare Gram positive cocci.  Rare Gram positive rods.     • Culture Result 04/07/2021 *                    Value:Streptococcus agalactiae (Group B)  Rare growth  Group B Streptococci are susceptible to ampicillin,  penicillin and cefazolin but may be erythromycin and/or  clindamycin resistant.  Notify Microbiology if erythromycin  or clindamycin testing is required.     • Significant Indicator 04/07/2021 .    • Source 04/07/2021 WND    • Site 04/07/2021 BENEATH BREASTS    • Gram Stain Result 04/07/2021                      Value:Rare Gram positive cocci.  Rare Gram positive rods.

## 2021-08-03 DIAGNOSIS — R07.89 OTHER CHEST PAIN: ICD-10-CM

## 2021-08-03 NOTE — PROGRESS NOTES
Patient had an  order for echo return from [facility].  I have placed a new order.  Please send to [facility] and notify patient.

## 2021-08-03 NOTE — PROGRESS NOTES
Jasvir Stacey returned an echo order that was  as patient did not have done.  Please send new order and notify patient.

## 2021-08-16 DIAGNOSIS — E11.3311 MODERATE NONPROLIFERATIVE DIABETIC RETINOPATHY OF RIGHT EYE WITH MACULAR EDEMA ASSOCIATED WITH TYPE 2 DIABETES MELLITUS (HCC): ICD-10-CM

## 2021-08-16 DIAGNOSIS — R21 RASH: ICD-10-CM

## 2021-08-16 DIAGNOSIS — Z79.4 TYPE 2 DIABETES MELLITUS WITH DIABETIC NEUROPATHY, WITH LONG-TERM CURRENT USE OF INSULIN (HCC): ICD-10-CM

## 2021-08-16 DIAGNOSIS — E11.40 TYPE 2 DIABETES MELLITUS WITH DIABETIC NEUROPATHY, WITH LONG-TERM CURRENT USE OF INSULIN (HCC): ICD-10-CM

## 2021-08-16 DIAGNOSIS — L30.4 INTERTRIGO: ICD-10-CM

## 2021-08-17 DIAGNOSIS — G44.89 OTHER HEADACHE SYNDROME: ICD-10-CM

## 2021-08-17 RX ORDER — NYSTATIN 100000 [USP'U]/G
POWDER TOPICAL
Qty: 120 G | Refills: 0 | OUTPATIENT
Start: 2021-08-17

## 2021-08-17 RX ORDER — IBUPROFEN 800 MG/1
TABLET ORAL
Qty: 30 TABLET | Refills: 0 | Status: SHIPPED | OUTPATIENT
Start: 2021-08-17 | End: 2021-09-23 | Stop reason: SDUPTHER

## 2021-08-17 RX ORDER — INSULIN LISPRO 100 [IU]/ML
11 INJECTION, SOLUTION INTRAVENOUS; SUBCUTANEOUS
Qty: 15 EACH | Refills: 2 | Status: SHIPPED | OUTPATIENT
Start: 2021-08-17 | End: 2021-12-07 | Stop reason: SDUPTHER

## 2021-08-17 RX ORDER — INSULIN DEGLUDEC 200 U/ML
70 INJECTION, SOLUTION SUBCUTANEOUS
Qty: 18 ML | Refills: 2 | Status: SHIPPED | OUTPATIENT
Start: 2021-08-17 | End: 2021-12-07 | Stop reason: SDUPTHER

## 2021-08-17 NOTE — TELEPHONE ENCOUNTER
Was the patient seen in the last year in this department? Yes    Does patient have an active prescription for medications requested? Yes    Received Request Via: Patient    Hospital Outpatient Visit on 07/19/2021   Component Date Value   • Forwarded to Lab: 07/19/2021 [facility]    • Forward Reason: 07/19/2021 Insurance    • Specimen Sent: 07/19/2021 Urine    Office Visit on 07/19/2021   Component Date Value   • POC Color 07/19/2021 yellow    • POC Appearance 07/19/2021 cloudy    • POC Leukocyte Esterase 07/19/2021 small    • POC Nitrites 07/19/2021 neg    • POC Urobiligen 07/19/2021 0.2    • POC Protein 07/19/2021 neg    • POC Urine PH 07/19/2021 5.5    • POC Blood 07/19/2021 trace    • POC Specific Gravity 07/19/2021 1.005    • POC Ketones 07/19/2021 neg    • POC Bilirubin 07/19/2021 neg    • POC Glucose 07/19/2021 500    Orders Only on 07/07/2021   Component Date Value   • Glucose 07/07/2021 77    • Bun 07/07/2021 22    • Creatinine 07/07/2021 0.86    • GFR If Non  Ameri* 07/07/2021 84    • GFR If  07/07/2021 96    • Bun-Creatinine Ratio 07/07/2021 26*   • Sodium 07/07/2021 138    • Potassium 07/07/2021 4.5    • Chloride 07/07/2021 102    • Co2 07/07/2021 22    • Calcium 07/07/2021 9.5    • Total Protein 07/07/2021 6.8    • Albumin 07/07/2021 3.6*   • Globulin 07/07/2021 3.2    • A-G Ratio 07/07/2021 1.1*   • Total Bilirubin 07/07/2021 0.2    • Alkaline Phosphatase 07/07/2021 110    • AST(SGOT) 07/07/2021 18    • ALT(SGPT) 07/07/2021 25    • Specific Gravity 07/07/2021 >=1.030*   • Ph 07/07/2021 6.0    • Color 07/07/2021 Yellow    • Character 07/07/2021 Turbid*   • Leukocyte Esterase 07/07/2021 2+*   • Protein 07/07/2021 2+*   • Glucose 07/07/2021 3+*   • Ketones 07/07/2021 Negative    • Occult Blood 07/07/2021 1+*   • Bilirubin 07/07/2021 Negative    • Urobilinogen Semi Qnt 07/07/2021 0.2    • Nitrite 07/07/2021 Negative    • Microscopic Exam 07/07/2021 See below:    • Microscopic Exam  07/07/2021 CANCELED    • Urinalysis Reflex 07/07/2021 Comment    • WBC 07/07/2021 >30*   • RBC 07/07/2021 3-10*   • Epithelial Cells Non Ovidio* 07/07/2021 >10*   • Epithelial Cells Renal 07/07/2021 CANCELED    • Urine Casts 07/07/2021 None seen    • Cast Type 07/07/2021 CANCELED    • Urine Crystals 07/07/2021 CANCELED    • Crystal Type 07/07/2021 CANCELED    • Mucous Threads 07/07/2021 Present    • Bacteria 07/07/2021 Many*   • Yeast Cells 07/07/2021 Present*   • Trichomonas 07/07/2021 CANCELED    • Comment 07/07/2021 CANCELED    • Urine Culture 07/07/2021 Final report*   • Result 1 07/07/2021 Escherichia coli*   • Result 2 07/07/2021 Comment    • Susceptibility 07/07/2021 Comment    • Cholesterol,Tot 07/07/2021 174    • Triglycerides 07/07/2021 299*   • HDL 07/07/2021 38*   • VLDL Cholesterol Calc 07/07/2021 49*   • LDL Chol Calc ([facility]) 07/07/2021 87    • Comment: 07/07/2021 CANCELED    • Creatinine, Random Urine 07/07/2021 71.6    • Microalbumin, Urine Rand* 07/07/2021 305.4    • Albumin / Creatinine Rat* 07/07/2021 427*   • Glycohemoglobin 07/07/2021 12.0*   • TSH 07/07/2021 2.210    • Free T-4 07/07/2021 2.41*   • 25-Hydroxy   Vitamin D 25 07/07/2021 19.4*   • Paul Peters [id number] [facility]* 07/07/2021 Comment    • Paul Peters LP Default 07/07/2021 Comment    Hospital Outpatient Visit on 07/07/2021   Component Date Value   • Forwarded to Lab: 07/08/2021 [facility]    • Forward Reason: 07/08/2021 Insurance    • Specimen Sent: 07/08/2021 Urine    • Specimen Sent (2nd): 07/08/2021 Blood    • Specimen Sent (3rd): 07/08/2021 Blood    • Specimen Sent (4th): 07/08/2021 Blood    Hospital Outpatient Visit on 04/07/2021   Component Date Value   • Significant Indicator 04/07/2021 NEG    • Source 04/07/2021 WND    • Site 04/07/2021 BENEATH BREASTS    • Culture Result 04/07/2021 No Anaerobes isolated.    • Significant Indicator 04/07/2021 POS*   • Source 04/07/2021 WND    • Site 04/07/2021 BENEATH BREASTS    • Culture Result 04/07/2021  Moderate growth usual skin felipe.*   • Gram Stain Result 04/07/2021                      Value:Rare Gram positive cocci.  Rare Gram positive rods.     • Culture Result 04/07/2021 *                    Value:Streptococcus agalactiae (Group B)  Rare growth  Group B Streptococci are susceptible to ampicillin,  penicillin and cefazolin but may be erythromycin and/or  clindamycin resistant.  Notify Microbiology if erythromycin  or clindamycin testing is required.     • Significant Indicator 04/07/2021 .    • Source 04/07/2021 WND    • Site 04/07/2021 BENEATH BREASTS    • Gram Stain Result 04/07/2021                      Value:Rare Gram positive cocci.  Rare Gram positive rods.     ]

## 2021-08-17 NOTE — TELEPHONE ENCOUNTER
Was the patient seen in the last year in this department? Yes    Does patient have an active prescription for medications requested? Yes    Received Request Via: Pharmacy    Hospital Outpatient Visit on 07/19/2021   Component Date Value   • Forwarded to Lab: 07/19/2021 [facility]    • Forward Reason: 07/19/2021 Insurance    • Specimen Sent: 07/19/2021 Urine    Office Visit on 07/19/2021   Component Date Value   • POC Color 07/19/2021 yellow    • POC Appearance 07/19/2021 cloudy    • POC Leukocyte Esterase 07/19/2021 small    • POC Nitrites 07/19/2021 neg    • POC Urobiligen 07/19/2021 0.2    • POC Protein 07/19/2021 neg    • POC Urine PH 07/19/2021 5.5    • POC Blood 07/19/2021 trace    • POC Specific Gravity 07/19/2021 1.005    • POC Ketones 07/19/2021 neg    • POC Bilirubin 07/19/2021 neg    • POC Glucose 07/19/2021 500    Orders Only on 07/07/2021   Component Date Value   • Glucose 07/07/2021 77    • Bun 07/07/2021 22    • Creatinine 07/07/2021 0.86    • GFR If Non  Ameri* 07/07/2021 84    • GFR If  07/07/2021 96    • Bun-Creatinine Ratio 07/07/2021 26*   • Sodium 07/07/2021 138    • Potassium 07/07/2021 4.5    • Chloride 07/07/2021 102    • Co2 07/07/2021 22    • Calcium 07/07/2021 9.5    • Total Protein 07/07/2021 6.8    • Albumin 07/07/2021 3.6*   • Globulin 07/07/2021 3.2    • A-G Ratio 07/07/2021 1.1*   • Total Bilirubin 07/07/2021 0.2    • Alkaline Phosphatase 07/07/2021 110    • AST(SGOT) 07/07/2021 18    • ALT(SGPT) 07/07/2021 25    • Specific Gravity 07/07/2021 >=1.030*   • Ph 07/07/2021 6.0    • Color 07/07/2021 Yellow    • Character 07/07/2021 Turbid*   • Leukocyte Esterase 07/07/2021 2+*   • Protein 07/07/2021 2+*   • Glucose 07/07/2021 3+*   • Ketones 07/07/2021 Negative    • Occult Blood 07/07/2021 1+*   • Bilirubin 07/07/2021 Negative    • Urobilinogen Semi Qnt 07/07/2021 0.2    • Nitrite 07/07/2021 Negative    • Microscopic Exam 07/07/2021 See below:    • Microscopic Exam  07/07/2021 CANCELED    • Urinalysis Reflex 07/07/2021 Comment    • WBC 07/07/2021 >30*   • RBC 07/07/2021 3-10*   • Epithelial Cells Non Ovidio* 07/07/2021 >10*   • Epithelial Cells Renal 07/07/2021 CANCELED    • Urine Casts 07/07/2021 None seen    • Cast Type 07/07/2021 CANCELED    • Urine Crystals 07/07/2021 CANCELED    • Crystal Type 07/07/2021 CANCELED    • Mucous Threads 07/07/2021 Present    • Bacteria 07/07/2021 Many*   • Yeast Cells 07/07/2021 Present*   • Trichomonas 07/07/2021 CANCELED    • Comment 07/07/2021 CANCELED    • Urine Culture 07/07/2021 Final report*   • Result 1 07/07/2021 Escherichia coli*   • Result 2 07/07/2021 Comment    • Susceptibility 07/07/2021 Comment    • Cholesterol,Tot 07/07/2021 174    • Triglycerides 07/07/2021 299*   • HDL 07/07/2021 38*   • VLDL Cholesterol Calc 07/07/2021 49*   • LDL Chol Calc ([facility]) 07/07/2021 87    • Comment: 07/07/2021 CANCELED    • Creatinine, Random Urine 07/07/2021 71.6    • Microalbumin, Urine Rand* 07/07/2021 305.4    • Albumin / Creatinine Rat* 07/07/2021 427*   • Glycohemoglobin 07/07/2021 12.0*   • TSH 07/07/2021 2.210    • Free T-4 07/07/2021 2.41*   • 25-Hydroxy   Vitamin D 25 07/07/2021 19.4*   • Paul Peters CMP14 [place]* 07/07/2021 Comment    • Paul Peters LP Default 07/07/2021 Comment    Hospital Outpatient Visit on 07/07/2021   Component Date Value   • Forwarded to Lab: 07/08/2021 [facility]    • Forward Reason: 07/08/2021 Insurance    • Specimen Sent: 07/08/2021 Urine    • Specimen Sent (2nd): 07/08/2021 Blood    • Specimen Sent (3rd): 07/08/2021 Blood    • Specimen Sent (4th): 07/08/2021 Blood    Hospital Outpatient Visit on 04/07/2021   Component Date Value   • Significant Indicator 04/07/2021 NEG    • Source 04/07/2021 WND    • Site 04/07/2021 BENEATH BREASTS    • Culture Result 04/07/2021 No Anaerobes isolated.    • Significant Indicator 04/07/2021 POS*   • Source 04/07/2021 WND    • Site 04/07/2021 BENEATH BREASTS    • Culture Result 04/07/2021  Moderate growth usual skin felipe.*   • Gram Stain Result 04/07/2021                      Value:Rare Gram positive cocci.  Rare Gram positive rods.     • Culture Result 04/07/2021 *                    Value:Streptococcus agalactiae (Group B)  Rare growth  Group B Streptococci are susceptible to ampicillin,  penicillin and cefazolin but may be erythromycin and/or  clindamycin resistant.  Notify Microbiology if erythromycin  or clindamycin testing is required.     • Significant Indicator 04/07/2021 .    • Source 04/07/2021 WND    • Site 04/07/2021 BENEATH BREASTS    • Gram Stain Result 04/07/2021                      Value:Rare Gram positive cocci.  Rare Gram positive rods.     ]

## 2021-09-09 ENCOUNTER — TELEMEDICINE (OUTPATIENT)
Dept: MEDICAL GROUP | Facility: CLINIC | Age: 41
End: 2021-09-09
Payer: MEDICARE

## 2021-09-09 VITALS — BODY MASS INDEX: 51.91 KG/M2 | WEIGHT: 293 LBS | HEIGHT: 63 IN

## 2021-09-09 DIAGNOSIS — I51.7 DILATED VENTRICLE: ICD-10-CM

## 2021-09-09 PROCEDURE — 99213 OFFICE O/P EST LOW 20 MIN: CPT | Mod: 95,CR | Performed by: PHYSICIAN ASSISTANT
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
## 2021-09-18 NOTE — PROGRESS NOTES
Bactrim sent in to pharmacy on file for rash.  There is a bacteria present.  Please make sure patient is not allergic to sulfa medications as I am sending in bactrim.  
Phone Number Called: [phone] (home)       Call outcome: Left detailed message for patient. Informed to call back with any additional questions.    Message: Left message for patient to call back or send a my chart message letting us know if she is allergic to sulfa medications     
18-Sep-2021 19:22

## 2021-09-23 DIAGNOSIS — L30.4 INTERTRIGO: ICD-10-CM

## 2021-09-23 DIAGNOSIS — G44.89 OTHER HEADACHE SYNDROME: ICD-10-CM

## 2021-09-23 DIAGNOSIS — R21 RASH: ICD-10-CM

## 2021-09-24 RX ORDER — IBUPROFEN 800 MG/1
800 TABLET ORAL EVERY 8 HOURS PRN
Qty: 30 TABLET | Refills: 0 | Status: SHIPPED | OUTPATIENT
Start: 2021-09-24 | End: 2021-11-15

## 2021-09-24 RX ORDER — NYSTATIN 100000 [USP'U]/G
POWDER TOPICAL
Qty: 120 G | Refills: 0 | Status: SHIPPED | OUTPATIENT
Start: 2021-09-24 | End: 2021-10-14 | Stop reason: SDUPTHER

## 2021-09-24 NOTE — TELEPHONE ENCOUNTER
Requested Prescriptions     Pending Prescriptions Disp Refills   • nystatin (MYCOSTATIN) powder 120 g 0     Sig: Apply to beneath breasts and to skin folds twice a day   • ibuprofen (MOTRIN) 800 MG Tab 30 Tablet 0     Sig: Take 1 Tablet by mouth every 8 hours as needed.       Last office visit: 09/22/21  Last lab:08/17/21

## 2021-10-10 DIAGNOSIS — E03.9 ACQUIRED HYPOTHYROIDISM: ICD-10-CM

## 2021-10-10 DIAGNOSIS — L30.4 INTERTRIGO: ICD-10-CM

## 2021-10-10 DIAGNOSIS — K92.1 BLOOD IN STOOL: ICD-10-CM

## 2021-10-10 DIAGNOSIS — R21 RASH: ICD-10-CM

## 2021-10-10 RX ORDER — METFORMIN HYDROCHLORIDE 500 MG/1
TABLET, EXTENDED RELEASE ORAL
Qty: 360 TABLET | Refills: 1 | Status: CANCELLED | OUTPATIENT
Start: 2021-10-10

## 2021-10-10 RX ORDER — NYSTATIN 100000 [USP'U]/G
POWDER TOPICAL
Qty: 120 G | Refills: 0 | Status: CANCELLED | OUTPATIENT
Start: 2021-10-10

## 2021-10-10 RX ORDER — EMPAGLIFLOZIN 25 MG/1
1 TABLET, FILM COATED ORAL DAILY
Qty: 90 TABLET | Refills: 1 | Status: CANCELLED | OUTPATIENT
Start: 2021-10-10

## 2021-10-10 RX ORDER — LEVOTHYROXINE SODIUM 0.1 MG/1
100 TABLET ORAL
Qty: 90 TABLET | Refills: 1 | Status: CANCELLED | OUTPATIENT
Start: 2021-10-10

## 2021-10-11 NOTE — TELEPHONE ENCOUNTER
Was the patient seen in the last year in this department? Yes    Does patient have an active prescription for medications requested? Yes    Received Request Via: Pharmacy    Hospital Outpatient Visit on 07/19/2021   Component Date Value   • Forwarded to Lab: 07/19/2021 [facility]    • Forward Reason: 07/19/2021 Insurance    • Specimen Sent: 07/19/2021 Urine    Office Visit on 07/19/2021   Component Date Value   • POC Color 07/19/2021 yellow    • POC Appearance 07/19/2021 cloudy    • POC Leukocyte Esterase 07/19/2021 small    • POC Nitrites 07/19/2021 neg    • POC Urobiligen 07/19/2021 0.2    • POC Protein 07/19/2021 neg    • POC Urine PH 07/19/2021 5.5    • POC Blood 07/19/2021 trace    • POC Specific Gravity 07/19/2021 1.005    • POC Ketones 07/19/2021 neg    • POC Bilirubin 07/19/2021 neg    • POC Glucose 07/19/2021 500    Orders Only on 07/07/2021   Component Date Value   • Glucose 07/07/2021 77    • Bun 07/07/2021 22    • Creatinine 07/07/2021 0.86    • GFR If Non  Ameri* 07/07/2021 84    • GFR If  07/07/2021 96    • Bun-Creatinine Ratio 07/07/2021 26*   • Sodium 07/07/2021 138    • Potassium 07/07/2021 4.5    • Chloride 07/07/2021 102    • Co2 07/07/2021 22    • Calcium 07/07/2021 9.5    • Total Protein 07/07/2021 6.8    • Albumin 07/07/2021 3.6*   • Globulin 07/07/2021 3.2    • A-G Ratio 07/07/2021 1.1*   • Total Bilirubin 07/07/2021 0.2    • Alkaline Phosphatase 07/07/2021 110    • AST(SGOT) 07/07/2021 18    • ALT(SGPT) 07/07/2021 25    • Specific Gravity 07/07/2021 >=1.030*   • Ph 07/07/2021 6.0    • Color 07/07/2021 Yellow    • Character 07/07/2021 Turbid*   • Leukocyte Esterase 07/07/2021 2+*   • Protein 07/07/2021 2+*   • Glucose 07/07/2021 3+*   • Ketones 07/07/2021 Negative    • Occult Blood 07/07/2021 1+*   • Bilirubin 07/07/2021 Negative    • Urobilinogen Semi Qnt 07/07/2021 0.2    • Nitrite 07/07/2021 Negative    • Microscopic Exam 07/07/2021 See below:    • Microscopic Exam  07/07/2021 CANCELED    • Urinalysis Reflex 07/07/2021 Comment    • WBC 07/07/2021 >30*   • RBC 07/07/2021 3-10*   • Epithelial Cells Non Ovidio* 07/07/2021 >10*   • Epithelial Cells Renal 07/07/2021 CANCELED    • Urine Casts 07/07/2021 None seen    • Cast Type 07/07/2021 CANCELED    • Urine Crystals 07/07/2021 CANCELED    • Crystal Type 07/07/2021 CANCELED    • Mucous Threads 07/07/2021 Present    • Bacteria 07/07/2021 Many*   • Yeast Cells 07/07/2021 Present*   • Trichomonas 07/07/2021 CANCELED    • Comment 07/07/2021 CANCELED    • Urine Culture 07/07/2021 Final report*   • Result 1 07/07/2021 Escherichia coli*   • Result 2 07/07/2021 Comment    • Susceptibility 07/07/2021 Comment    • Cholesterol,Tot 07/07/2021 174    • Triglycerides 07/07/2021 299*   • HDL 07/07/2021 38*   • VLDL Cholesterol Calc 07/07/2021 49*   • LDL Chol Calc ([facility]) 07/07/2021 87    • Comment: 07/07/2021 CANCELED    • Creatinine, Random Urine 07/07/2021 71.6    • Microalbumin, Urine Rand* 07/07/2021 305.4    • Albumin / Creatinine Rat* 07/07/2021 427*   • Glycohemoglobin 07/07/2021 12.0*   • TSH 07/07/2021 2.210    • Free T-4 07/07/2021 2.41*   • 25-Hydroxy   Vitamin D 25 07/07/2021 19.4*   • Paul Peters [id number] [place]* 07/07/2021 Comment    • Paul Peters LP Default 07/07/2021 Comment    Hospital Outpatient Visit on 07/07/2021   Component Date Value   • Forwarded to Lab: 07/08/2021 [facility]    • Forward Reason: 07/08/2021 Insurance    • Specimen Sent: 07/08/2021 Urine    • Specimen Sent (2nd): 07/08/2021 Blood    • Specimen Sent (3rd): 07/08/2021 Blood    • Specimen Sent (4th): 07/08/2021 Blood    Hospital Outpatient Visit on 04/07/2021   Component Date Value   • Significant Indicator 04/07/2021 NEG    • Source 04/07/2021 WND    • Site 04/07/2021 BENEATH BREASTS    • Culture Result 04/07/2021 No Anaerobes isolated.    • Significant Indicator 04/07/2021 POS*   • Source 04/07/2021 WND    • Site 04/07/2021 BENEATH BREASTS    • Culture Result 04/07/2021  Moderate growth usual skin felipe.*   • Gram Stain Result 04/07/2021                      Value:Rare Gram positive cocci.  Rare Gram positive rods.     • Culture Result 04/07/2021 *                    Value:Streptococcus agalactiae (Group B)  Rare growth  Group B Streptococci are susceptible to ampicillin,  penicillin and cefazolin but may be erythromycin and/or  clindamycin resistant.  Notify Microbiology if erythromycin  or clindamycin testing is required.     • Significant Indicator 04/07/2021 .    • Source 04/07/2021 WND    • Site 04/07/2021 BENEATH BREASTS    • Gram Stain Result 04/07/2021                      Value:Rare Gram positive cocci.  Rare Gram positive rods.     ]

## 2021-10-12 RX ORDER — HYDROCORTISONE 25 MG/G
CREAM TOPICAL
Qty: 28 G | Refills: 0 | Status: SHIPPED | OUTPATIENT
Start: 2021-10-12 | End: 2021-12-12 | Stop reason: SDUPTHER

## 2021-11-03 ENCOUNTER — TELEPHONE (OUTPATIENT)
Dept: MEDICAL GROUP | Facility: CLINIC | Age: 41
End: 2021-11-03

## 2021-11-03 NOTE — TELEPHONE ENCOUNTER
VOICEMAIL  1. Caller Name: Shilpa                      Call Back Number: [phone]    2. Message: [facility] called needing Dx code for Urine from 7/7/21    3. Patient approves office to leave a detailed voicemail/MyChart message: N\A

## 2021-11-14 DIAGNOSIS — G44.89 OTHER HEADACHE SYNDROME: ICD-10-CM

## 2021-11-15 RX ORDER — IBUPROFEN 800 MG/1
TABLET ORAL
Qty: 30 TABLET | Refills: 0 | Status: SHIPPED | OUTPATIENT
Start: 2021-11-15 | End: 2022-11-17

## 2021-11-15 NOTE — TELEPHONE ENCOUNTER
Was the patient seen in the last year in this department? Yes    Does patient have an active prescription for medications requested? Yes    Received Request Via: Pharmacy    Hospital Outpatient Visit on 07/19/2021   Component Date Value   • Forwarded to Lab: 07/19/2021 [facility]    • Forward Reason: 07/19/2021 Insurance    • Specimen Sent: 07/19/2021 Urine    Office Visit on 07/19/2021   Component Date Value   • POC Color 07/19/2021 yellow    • POC Appearance 07/19/2021 cloudy    • POC Leukocyte Esterase 07/19/2021 small    • POC Nitrites 07/19/2021 neg    • POC Urobiligen 07/19/2021 0.2    • POC Protein 07/19/2021 neg    • POC Urine PH 07/19/2021 5.5    • POC Blood 07/19/2021 trace    • POC Specific Gravity 07/19/2021 1.005    • POC Ketones 07/19/2021 neg    • POC Bilirubin 07/19/2021 neg    • POC Glucose 07/19/2021 500    Orders Only on 07/07/2021   Component Date Value   • Glucose 07/07/2021 77    • Bun 07/07/2021 22    • Creatinine 07/07/2021 0.86    • GFR If Non  Ameri* 07/07/2021 84    • GFR If  07/07/2021 96    • Bun-Creatinine Ratio 07/07/2021 26*   • Sodium 07/07/2021 138    • Potassium 07/07/2021 4.5    • Chloride 07/07/2021 102    • Co2 07/07/2021 22    • Calcium 07/07/2021 9.5    • Total Protein 07/07/2021 6.8    • Albumin 07/07/2021 3.6*   • Globulin 07/07/2021 3.2    • A-G Ratio 07/07/2021 1.1*   • Total Bilirubin 07/07/2021 0.2    • Alkaline Phosphatase 07/07/2021 110    • AST(SGOT) 07/07/2021 18    • ALT(SGPT) 07/07/2021 25    • Specific Gravity 07/07/2021 >=1.030*   • Ph 07/07/2021 6.0    • Color 07/07/2021 Yellow    • Character 07/07/2021 Turbid*   • Leukocyte Esterase 07/07/2021 2+*   • Protein 07/07/2021 2+*   • Glucose 07/07/2021 3+*   • Ketones 07/07/2021 Negative    • Occult Blood 07/07/2021 1+*   • Bilirubin 07/07/2021 Negative    • Urobilinogen Semi Qnt 07/07/2021 0.2    • Nitrite 07/07/2021 Negative    • Microscopic Exam 07/07/2021 See below:    • Microscopic Exam  07/07/2021 CANCELED    • Urinalysis Reflex 07/07/2021 Comment    • WBC 07/07/2021 >30*   • RBC 07/07/2021 3-10*   • Epithelial Cells Non Ovidio* 07/07/2021 >10*   • Epithelial Cells Renal 07/07/2021 CANCELED    • Urine Casts 07/07/2021 None seen    • Cast Type 07/07/2021 CANCELED    • Urine Crystals 07/07/2021 CANCELED    • Crystal Type 07/07/2021 CANCELED    • Mucous Threads 07/07/2021 Present    • Bacteria 07/07/2021 Many*   • Yeast Cells 07/07/2021 Present*   • Trichomonas 07/07/2021 CANCELED    • Comment 07/07/2021 CANCELED    • Urine Culture 07/07/2021 Final report*   • Result 1 07/07/2021 Escherichia coli*   • Result 2 07/07/2021 Comment    • Susceptibility 07/07/2021 Comment    • Cholesterol,Tot 07/07/2021 174    • Triglycerides 07/07/2021 299*   • HDL 07/07/2021 38*   • VLDL Cholesterol Calc 07/07/2021 49*   • LDL Chol Calc ([facility]) 07/07/2021 87    • Comment: 07/07/2021 CANCELED    • Creatinine, Random Urine 07/07/2021 71.6    • Microalbumin, Urine Rand* 07/07/2021 305.4    • Albumin / Creatinine Rat* 07/07/2021 427*   • Glycohemoglobin 07/07/2021 12.0*   • TSH 07/07/2021 2.210    • Free T-4 07/07/2021 2.41*   • 25-Hydroxy   Vitamin D 25 07/07/2021 19.4*   • Paul Peters CMP14 [place]* 07/07/2021 Comment    • Paul Peters LP Default 07/07/2021 Comment    Hospital Outpatient Visit on 07/07/2021   Component Date Value   • Forwarded to Lab: 07/08/2021 [facility]    • Forward Reason: 07/08/2021 Insurance    • Specimen Sent: 07/08/2021 Urine    • Specimen Sent (2nd): 07/08/2021 Blood    • Specimen Sent (3rd): 07/08/2021 Blood    • Specimen Sent (4th): 07/08/2021 Blood    Hospital Outpatient Visit on 04/07/2021   Component Date Value   • Significant Indicator 04/07/2021 NEG    • Source 04/07/2021 WND    • Site 04/07/2021 BENEATH BREASTS    • Culture Result 04/07/2021 No Anaerobes isolated.    • Significant Indicator 04/07/2021 POS*   • Source 04/07/2021 WND    • Site 04/07/2021 BENEATH BREASTS    • Culture Result 04/07/2021  Moderate growth usual skin felipe.*   • Gram Stain Result 04/07/2021                      Value:Rare Gram positive cocci.  Rare Gram positive rods.     • Culture Result 04/07/2021 *                    Value:Streptococcus agalactiae (Group B)  Rare growth  Group B Streptococci are susceptible to ampicillin,  penicillin and cefazolin but may be erythromycin and/or  clindamycin resistant.  Notify Microbiology if erythromycin  or clindamycin testing is required.     • Significant Indicator 04/07/2021 .    • Source 04/07/2021 WND    • Site 04/07/2021 BENEATH BREASTS    • Gram Stain Result 04/07/2021                      Value:Rare Gram positive cocci.  Rare Gram positive rods.     ]

## 2021-12-07 DIAGNOSIS — L30.4 INTERTRIGO: ICD-10-CM

## 2021-12-07 DIAGNOSIS — Z79.4 TYPE 2 DIABETES MELLITUS WITH DIABETIC NEUROPATHY, WITH LONG-TERM CURRENT USE OF INSULIN (HCC): ICD-10-CM

## 2021-12-07 DIAGNOSIS — E03.9 ACQUIRED HYPOTHYROIDISM: ICD-10-CM

## 2021-12-07 DIAGNOSIS — E11.3311 MODERATE NONPROLIFERATIVE DIABETIC RETINOPATHY OF RIGHT EYE WITH MACULAR EDEMA ASSOCIATED WITH TYPE 2 DIABETES MELLITUS (HCC): ICD-10-CM

## 2021-12-07 DIAGNOSIS — G44.89 OTHER HEADACHE SYNDROME: ICD-10-CM

## 2021-12-07 DIAGNOSIS — I10 ESSENTIAL HYPERTENSION: ICD-10-CM

## 2021-12-07 DIAGNOSIS — E11.40 TYPE 2 DIABETES MELLITUS WITH DIABETIC NEUROPATHY, WITH LONG-TERM CURRENT USE OF INSULIN (HCC): ICD-10-CM

## 2021-12-07 DIAGNOSIS — R21 RASH: ICD-10-CM

## 2021-12-07 DIAGNOSIS — E78.2 MIXED HYPERLIPIDEMIA: ICD-10-CM

## 2021-12-07 RX ORDER — IBUPROFEN 800 MG/1
800 TABLET ORAL EVERY 8 HOURS PRN
Qty: 30 TABLET | Refills: 0 | Status: CANCELLED | OUTPATIENT
Start: 2021-12-07

## 2021-12-07 NOTE — TELEPHONE ENCOUNTER
Was the patient seen in the last year in this department? Yes    Does patient have an active prescription for medications requested? Yes    Received Request Via: Pharmacy    Hospital Outpatient Visit on 07/19/2021   Component Date Value   • Forwarded to Lab: 07/19/2021 [facility]    • Forward Reason: 07/19/2021 Insurance    • Specimen Sent: 07/19/2021 Urine    Office Visit on 07/19/2021   Component Date Value   • POC Color 07/19/2021 yellow    • POC Appearance 07/19/2021 cloudy    • POC Leukocyte Esterase 07/19/2021 small    • POC Nitrites 07/19/2021 neg    • POC Urobiligen 07/19/2021 0.2    • POC Protein 07/19/2021 neg    • POC Urine PH 07/19/2021 5.5    • POC Blood 07/19/2021 trace    • POC Specific Gravity 07/19/2021 1.005    • POC Ketones 07/19/2021 neg    • POC Bilirubin 07/19/2021 neg    • POC Glucose 07/19/2021 500    Orders Only on 07/07/2021   Component Date Value   • Glucose 07/07/2021 77    • Bun 07/07/2021 22    • Creatinine 07/07/2021 0.86    • GFR If Non  Ameri* 07/07/2021 84    • GFR If  07/07/2021 96    • Bun-Creatinine Ratio 07/07/2021 26*   • Sodium 07/07/2021 138    • Potassium 07/07/2021 4.5    • Chloride 07/07/2021 102    • Co2 07/07/2021 22    • Calcium 07/07/2021 9.5    • Total Protein 07/07/2021 6.8    • Albumin 07/07/2021 3.6*   • Globulin 07/07/2021 3.2    • A-G Ratio 07/07/2021 1.1*   • Total Bilirubin 07/07/2021 0.2    • Alkaline Phosphatase 07/07/2021 110    • AST(SGOT) 07/07/2021 18    • ALT(SGPT) 07/07/2021 25    • Specific Gravity 07/07/2021 >=1.030*   • Ph 07/07/2021 6.0    • Color 07/07/2021 Yellow    • Character 07/07/2021 Turbid*   • Leukocyte Esterase 07/07/2021 2+*   • Protein 07/07/2021 2+*   • Glucose 07/07/2021 3+*   • Ketones 07/07/2021 Negative    • Occult Blood 07/07/2021 1+*   • Bilirubin 07/07/2021 Negative    • Urobilinogen Semi Qnt 07/07/2021 0.2    • Nitrite 07/07/2021 Negative    • Microscopic Exam 07/07/2021 See below:    • Microscopic Exam  07/07/2021 CANCELED    • Urinalysis Reflex 07/07/2021 Comment    • WBC 07/07/2021 >30*   • RBC 07/07/2021 3-10*   • Epithelial Cells Non Ovidio* 07/07/2021 >10*   • Epithelial Cells Renal 07/07/2021 CANCELED    • Urine Casts 07/07/2021 None seen    • Cast Type 07/07/2021 CANCELED    • Urine Crystals 07/07/2021 CANCELED    • Crystal Type 07/07/2021 CANCELED    • Mucous Threads 07/07/2021 Present    • Bacteria 07/07/2021 Many*   • Yeast Cells 07/07/2021 Present*   • Trichomonas 07/07/2021 CANCELED    • Comment 07/07/2021 CANCELED    • Urine Culture 07/07/2021 Final report*   • Result 1 07/07/2021 Escherichia coli*   • Result 2 07/07/2021 Comment    • Susceptibility 07/07/2021 Comment    • Cholesterol,Tot 07/07/2021 174    • Triglycerides 07/07/2021 299*   • HDL 07/07/2021 38*   • VLDL Cholesterol Calc 07/07/2021 49*   • LDL Chol Calc ([facility]) 07/07/2021 87    • Comment: 07/07/2021 CANCELED    • Creatinine, Random Urine 07/07/2021 71.6    • Microalbumin, Urine Rand* 07/07/2021 305.4    • Albumin / Creatinine Rat* 07/07/2021 427*   • Glycohemoglobin 07/07/2021 12.0*   • TSH 07/07/2021 2.210    • Free T-4 07/07/2021 2.41*   • 25-Hydroxy   Vitamin D 25 07/07/2021 19.4*   • Paul Peters CMP14 [place]* 07/07/2021 Comment    • Paul Peters LP Default 07/07/2021 Comment    Hospital Outpatient Visit on 07/07/2021   Component Date Value   • Forwarded to Lab: 07/08/2021 [facility]    • Forward Reason: 07/08/2021 Insurance    • Specimen Sent: 07/08/2021 Urine    • Specimen Sent (2nd): 07/08/2021 Blood    • Specimen Sent (3rd): 07/08/2021 Blood    • Specimen Sent (4th): 07/08/2021 Blood    Hospital Outpatient Visit on 04/07/2021   Component Date Value   • Significant Indicator 04/07/2021 NEG    • Source 04/07/2021 WND    • Site 04/07/2021 BENEATH BREASTS    • Culture Result 04/07/2021 No Anaerobes isolated.    • Significant Indicator 04/07/2021 POS*   • Source 04/07/2021 WND    • Site 04/07/2021 BENEATH BREASTS    • Culture Result 04/07/2021  Moderate growth usual skin felipe.*   • Gram Stain Result 04/07/2021                      Value:Rare Gram positive cocci.  Rare Gram positive rods.     • Culture Result 04/07/2021 *                    Value:Streptococcus agalactiae (Group B)  Rare growth  Group B Streptococci are susceptible to ampicillin,  penicillin and cefazolin but may be erythromycin and/or  clindamycin resistant.  Notify Microbiology if erythromycin  or clindamycin testing is required.     • Significant Indicator 04/07/2021 .    • Source 04/07/2021 WND    • Site 04/07/2021 BENEATH BREASTS    • Gram Stain Result 04/07/2021                      Value:Rare Gram positive cocci.  Rare Gram positive rods.     ]

## 2021-12-08 ENCOUNTER — OFFICE VISIT (OUTPATIENT)
Dept: MEDICAL GROUP | Facility: CLINIC | Age: 41
End: 2021-12-08
Payer: MEDICARE

## 2021-12-08 VITALS
OXYGEN SATURATION: 99 % | SYSTOLIC BLOOD PRESSURE: 112 MMHG | WEIGHT: 293 LBS | HEART RATE: 93 BPM | TEMPERATURE: 97.1 F | BODY MASS INDEX: 51.91 KG/M2 | DIASTOLIC BLOOD PRESSURE: 80 MMHG | HEIGHT: 63 IN

## 2021-12-08 DIAGNOSIS — E03.9 ACQUIRED HYPOTHYROIDISM: ICD-10-CM

## 2021-12-08 DIAGNOSIS — I10 PRIMARY HYPERTENSION: ICD-10-CM

## 2021-12-08 DIAGNOSIS — E11.40 TYPE 2 DIABETES MELLITUS WITH DIABETIC NEUROPATHY, WITH LONG-TERM CURRENT USE OF INSULIN (HCC): ICD-10-CM

## 2021-12-08 DIAGNOSIS — G44.209 TENSION HEADACHE: ICD-10-CM

## 2021-12-08 DIAGNOSIS — Z79.4 TYPE 2 DIABETES MELLITUS WITH DIABETIC NEUROPATHY, WITH LONG-TERM CURRENT USE OF INSULIN (HCC): ICD-10-CM

## 2021-12-08 DIAGNOSIS — E78.2 MIXED HYPERLIPIDEMIA: ICD-10-CM

## 2021-12-08 DIAGNOSIS — R21 RASH: ICD-10-CM

## 2021-12-08 PROCEDURE — 99214 OFFICE O/P EST MOD 30 MIN: CPT | Performed by: PHYSICIAN ASSISTANT

## 2021-12-08 RX ORDER — NYSTATIN 100000 [USP'U]/G
POWDER TOPICAL
Qty: 160 G | Refills: 0 | Status: SHIPPED | OUTPATIENT
Start: 2021-12-08 | End: 2022-01-26 | Stop reason: SDUPTHER

## 2021-12-08 RX ORDER — METFORMIN HYDROCHLORIDE 500 MG/1
TABLET, EXTENDED RELEASE ORAL
Qty: 60 TABLET | Refills: 0 | Status: SHIPPED | OUTPATIENT
Start: 2021-12-08 | End: 2022-01-26 | Stop reason: SDUPTHER

## 2021-12-08 RX ORDER — EMPAGLIFLOZIN 25 MG/1
1 TABLET, FILM COATED ORAL DAILY
Qty: 30 TABLET | Refills: 0 | Status: SHIPPED | OUTPATIENT
Start: 2021-12-08 | End: 2022-01-26 | Stop reason: SDUPTHER

## 2021-12-08 RX ORDER — ISOPROPYL ALCOHOL 0.7 ML/ML
SWAB TOPICAL
COMMUNITY
Start: 2021-11-15

## 2021-12-08 RX ORDER — LEVOTHYROXINE SODIUM 0.1 MG/1
100 TABLET ORAL
Qty: 90 TABLET | Refills: 1 | Status: SHIPPED | OUTPATIENT
Start: 2021-12-08 | End: 2022-01-26 | Stop reason: SDUPTHER

## 2021-12-08 RX ORDER — INSULIN LISPRO 100 [IU]/ML
11 INJECTION, SOLUTION INTRAVENOUS; SUBCUTANEOUS
Qty: 5 EACH | Refills: 0 | Status: SHIPPED | OUTPATIENT
Start: 2021-12-08

## 2021-12-08 RX ORDER — INSULIN ASPART 100 [IU]/ML
INJECTION, SOLUTION INTRAVENOUS; SUBCUTANEOUS
COMMUNITY
Start: 2021-09-10 | End: 2022-06-08

## 2021-12-08 RX ORDER — PIOGLITAZONEHYDROCHLORIDE 30 MG/1
TABLET ORAL
Qty: 30 TABLET | Refills: 0 | Status: SHIPPED | OUTPATIENT
Start: 2021-12-08 | End: 2022-01-26 | Stop reason: SDUPTHER

## 2021-12-08 RX ORDER — ATORVASTATIN CALCIUM 40 MG/1
40 TABLET, FILM COATED ORAL DAILY
Qty: 90 TABLET | Refills: 2 | Status: SHIPPED | OUTPATIENT
Start: 2021-12-08 | End: 2022-10-25 | Stop reason: SDUPTHER

## 2021-12-08 RX ORDER — INSULIN DEGLUDEC 200 U/ML
70 INJECTION, SOLUTION SUBCUTANEOUS
Qty: 6 ML | Refills: 0 | Status: SHIPPED
Start: 2021-12-08 | End: 2022-11-17

## 2021-12-08 RX ORDER — LOSARTAN POTASSIUM 25 MG/1
50 TABLET ORAL DAILY
Qty: 180 TABLET | Refills: 0 | Status: SHIPPED | OUTPATIENT
Start: 2021-12-08 | End: 2022-01-26 | Stop reason: SDUPTHER

## 2021-12-08 RX ORDER — BACLOFEN 20 MG/1
20 TABLET ORAL 2 TIMES DAILY
Qty: 60 TABLET | Refills: 0 | Status: SHIPPED | OUTPATIENT
Start: 2021-12-08 | End: 2022-01-04

## 2021-12-08 NOTE — PROGRESS NOTES
cc:  Medication refill    Subjective:     Tarah Britton is a 41 y.o. female presenting for medication refill      Patient presents to the office for medication refill.   Patient states that she has been out of her jardiance for a week.  She states that her sugars have been lower.  She states that her sugars have been 33-44 with an increase in the trulicity.  Patient is also needing refills for her hypothyroidism, hyperlipidemia and hypertension.  She will be due for lab work shortly.    Patient states that she is having headaches every other day.  She states that they are all around her head.  She states that they are behind both eyes.  She states that she will see the eye doctor in February.  She has been seeing the eye doctor every 3 months for a broken blood vessel.  Her headaches have been coming more frequently and she has been using more ibuprofen which is concerning as patient is diabetic and kidney protection is important.  She does indicate having some neck tension as well.    Patient has had a continual ongoing rash located under the pannus which she feels is getting worse.  She is starting to use more and more medication without improvement.    Review of systems:  See above.   Denies any symptoms unless previously indicated.        Current Outpatient Medications:   •  metFORMIN ER (GLUCOPHAGE XR) 500 MG TABLET SR 24 HR, TAKE TWO TABLETS BY MOUTH TWICE A DAY, Disp: 60 Tablet, Rfl: 0  •  baclofen (LIORESAL) 20 MG tablet, Take 1 Tablet by mouth 2 times a day., Disp: 60 Tablet, Rfl: 0  •  hydrocortisone 2.5 % Cream topical cream, , Disp: , Rfl:   •  ibuprofen (MOTRIN) 800 MG Tab, TAKE ONE TABLET BY MOUTH EVERY 8 HOURS AS NEEDED, Disp: 30 Tablet, Rfl: 0  •  gabapentin (NEURONTIN) 300 MG Cap, TAKE TWO CAPSULES BY MOUTH TWICE A DAY, Disp: 360 Capsule, Rfl: 2  •  hydrocortisone rectal (ANUSOL-HC) 2.5% Cream, Apply a small amount to affected area twice a day no longer than 2 weeks, Disp: 28 g, Rfl: 0  •   Dulaglutide (TRULICITY) 1.5 MG/0.5ML Solution Pen-injector, Inject 0.5 mL under the skin every 7 days., Disp: 2 mL, Rfl: 5  •  EYLEA 2 MG/0.05ML Solution, , Disp: , Rfl:   •  albuterol (PROVENTIL) 2.5mg/3ml Nebu Soln solution for nebulization, 3 mL by Nebulization route every four hours as needed for Shortness of Breath., Disp: 100 Bullet, Rfl: 3  •  Insulin Pen Needle 32G X 4 MM Misc, 1 Each by Does not apply route 4 times a day., Disp: 120 Each, Rfl: 11  •  Insulin Degludec (TRESIBA FLEXTOUCH) 200 UNIT/ML Solution Pen-injector, Inject 70 Units under the skin at bedtime., Disp: 6 mL, Rfl: 0  •  insulin lispro (HUMALOG,ADMELOG) 100 UNIT/ML Solution Pen-injector injection PEN, Inject 11 Units under the skin 3 times a day before meals., Disp: 5 Each, Rfl: 0  •  losartan (COZAAR) 25 MG Tab, Take 2 Tablets by mouth every day., Disp: 180 Tablet, Rfl: 0  •  Empagliflozin (JARDIANCE) 25 MG Tab, Take 1 Tablet by mouth every day. TAKE ONE TABLET BY MOUTH DAILY, Disp: 30 Tablet, Rfl: 0  •  levothyroxine (SYNTHROID) 100 MCG Tab, Take 1 Tablet by mouth every morning on an empty stomach. ON EMPTY STOMACH, Disp: 90 Tablet, Rfl: 1  •  atorvastatin (LIPITOR) 40 MG Tab, Take 1 Tablet by mouth every day. TAKE ONE TABLET BY MOUTH DAILY, Disp: 90 Tablet, Rfl: 2  •  pioglitazone (ACTOS) 30 MG Tab, TAKE ONE TABLET BY MOUTH DAILY, Disp: 30 Tablet, Rfl: 0  •  nystatin (MYCOSTATIN) powder, Apply to beneath breasts and to skin folds twice a day, Disp: 160 g, Rfl: 0  •  Alcohol Swabs (PHARMACIST CHOICE ALCOHOL) Pads, , Disp: , Rfl:   •  Insulin Aspart FlexPen 100 UNIT/ML Solution Pen-injector, , Disp: , Rfl:   •  escitalopram (LEXAPRO) 10 MG Tab, TAKE ONE TABLET BY MOUTH DAILY, Disp: 90 Tablet, Rfl: 0    Allergies, past medical history, past surgical history, family history, social history reviewed and updated    Objective:     Vitals: /80 (BP Location: Left arm, Patient Position: Sitting, BP Cuff Size: Large adult)   Pulse 93   Temp  "36.2 °C (97.1 °F) (Temporal)   Ht 1.6 m (5' 3\") Comment: obtained from chart  Wt (!) 153 kg (337 lb) Comment: with shoes on  SpO2 99%   BMI 59.70 kg/m²   General: Alert, pleasant, NAD  EYES:   PERRL, EOMI, no icterus or pallor.  Conjunctivae and lids normal.   HENT:  Normocephalic.  External ears normal.  Neck supple.    Respiratory: Normal respiratory effort.    Abdomen: obese  Skin: Warm, dry, no rashes.  Musculoskeletal: Gait is normal.  Moves all extremities well.    Extremities: normal range of motion all extremities.   Neurological: No tremors, sensation grossly intact, gait is normal, CN2-12 intact.  Psych:  Affect/mood is normal, judgement is good, memory is intact, grooming is appropriate.    Assessment/Plan:     Tarah was seen today for medication refill.    Diagnoses and all orders for this visit:    Uncontrolled type 2 diabetes mellitus with diabetic neuropathy, without long-term current use of insulin (HCC)  -     metFORMIN ER (GLUCOPHAGE XR) 500 MG TABLET SR 24 HR; TAKE TWO TABLETS BY MOUTH TWICE A DAY  Type 2 diabetes mellitus with diabetic neuropathy, with long-term current use of insulin (HCC)  -     Lipid Profile; Future  -     Comp Metabolic Panel; Future  -     HEMOGLOBIN A1C; Future    I have refilled medications for 1 month.  Patient will need to discuss with endocrinology especially as she has been having some lows in her sugars.  They will need to fill her diabetic medications at this time.    Acquired hypothyroidism  -     FREE THYROXINE; Future  -     TSH; Future  Mixed hyperlipidemia  -     Lipid Profile; Future  -     Comp Metabolic Panel; Future  Primary hypertension    Medications filled.  Labs will be due in approximately 2 to 3 months.    Rash  -     Referral to Dermatology    Rash is not improving and patient is continually using more more medication.  Referral submitted at this time.    Tension headache  -     baclofen (LIORESAL) 20 MG tablet; Take 1 Tablet by mouth 2 times a " day.  We will try patient on baclofen.  Side effects discussed with patient.  Follow-up in 4 weeks, sooner if needed.            Return if symptoms worsen or fail to improve, for follow up meds.    Please note that this dictation was created using voice recognition software. I have made every reasonable attempt to correct obvious errors, but expect that there are errors of grammar and possible content that I did not discover before finalizing note.

## 2021-12-12 DIAGNOSIS — K92.1 BLOOD IN STOOL: ICD-10-CM

## 2021-12-13 RX ORDER — HYDROCORTISONE 25 MG/G
CREAM TOPICAL
Qty: 28 G | Refills: 0 | Status: SHIPPED
Start: 2021-12-13 | End: 2022-11-17

## 2021-12-13 NOTE — TELEPHONE ENCOUNTER
Was the patient seen in the last year in this department? Yes    Does patient have an active prescription for medications requested? Yes    Received Request Via: Pharmacy    Hospital Outpatient Visit on 07/19/2021   Component Date Value   • Forwarded to Lab: 07/19/2021 [facility]    • Forward Reason: 07/19/2021 Insurance    • Specimen Sent: 07/19/2021 Urine    Office Visit on 07/19/2021   Component Date Value   • POC Color 07/19/2021 yellow    • POC Appearance 07/19/2021 cloudy    • POC Leukocyte Esterase 07/19/2021 small    • POC Nitrites 07/19/2021 neg    • POC Urobiligen 07/19/2021 0.2    • POC Protein 07/19/2021 neg    • POC Urine PH 07/19/2021 5.5    • POC Blood 07/19/2021 trace    • POC Specific Gravity 07/19/2021 1.005    • POC Ketones 07/19/2021 neg    • POC Bilirubin 07/19/2021 neg    • POC Glucose 07/19/2021 500    Orders Only on 07/07/2021   Component Date Value   • Glucose 07/07/2021 77    • Bun 07/07/2021 22    • Creatinine 07/07/2021 0.86    • GFR If Non  Ameri* 07/07/2021 84    • GFR If  07/07/2021 96    • Bun-Creatinine Ratio 07/07/2021 26*   • Sodium 07/07/2021 138    • Potassium 07/07/2021 4.5    • Chloride 07/07/2021 102    • Co2 07/07/2021 22    • Calcium 07/07/2021 9.5    • Total Protein 07/07/2021 6.8    • Albumin 07/07/2021 3.6*   • Globulin 07/07/2021 3.2    • A-G Ratio 07/07/2021 1.1*   • Total Bilirubin 07/07/2021 0.2    • Alkaline Phosphatase 07/07/2021 110    • AST(SGOT) 07/07/2021 18    • ALT(SGPT) 07/07/2021 25    • Specific Gravity 07/07/2021 >=1.030*   • Ph 07/07/2021 6.0    • Color 07/07/2021 Yellow    • Character 07/07/2021 Turbid*   • Leukocyte Esterase 07/07/2021 2+*   • Protein 07/07/2021 2+*   • Glucose 07/07/2021 3+*   • Ketones 07/07/2021 Negative    • Occult Blood 07/07/2021 1+*   • Bilirubin 07/07/2021 Negative    • Urobilinogen Semi Qnt 07/07/2021 0.2    • Nitrite 07/07/2021 Negative    • Microscopic Exam 07/07/2021 See below:    • Microscopic Exam  07/07/2021 CANCELED    • Urinalysis Reflex 07/07/2021 Comment    • WBC 07/07/2021 >30*   • RBC 07/07/2021 3-10*   • Epithelial Cells Non Ovidio* 07/07/2021 >10*   • Epithelial Cells Renal 07/07/2021 CANCELED    • Urine Casts 07/07/2021 None seen    • Cast Type 07/07/2021 CANCELED    • Urine Crystals 07/07/2021 CANCELED    • Crystal Type 07/07/2021 CANCELED    • Mucous Threads 07/07/2021 Present    • Bacteria 07/07/2021 Many*   • Yeast Cells 07/07/2021 Present*   • Trichomonas 07/07/2021 CANCELED    • Comment 07/07/2021 CANCELED    • Urine Culture 07/07/2021 Final report*   • Result 1 07/07/2021 Escherichia coli*   • Result 2 07/07/2021 Comment    • Susceptibility 07/07/2021 Comment    • Cholesterol,Tot 07/07/2021 174    • Triglycerides 07/07/2021 299*   • HDL 07/07/2021 38*   • VLDL Cholesterol Calc 07/07/2021 49*   • LDL Chol Calc ([facility]) 07/07/2021 87    • Comment: 07/07/2021 CANCELED    • Creatinine, Random Urine 07/07/2021 71.6    • Microalbumin, Urine Rand* 07/07/2021 305.4    • Albumin / Creatinine Rat* 07/07/2021 427*   • Glycohemoglobin 07/07/2021 12.0*   • TSH 07/07/2021 2.210    • Free T-4 07/07/2021 2.41*   • 25-Hydroxy   Vitamin D 25 07/07/2021 19.4*   • Paul Peters CMP14 Defau* 07/07/2021 Comment    • Paul Peters LP Default 07/07/2021 Comment    Hospital Outpatient Visit on 07/07/2021   Component Date Value   • Forwarded to Lab: 07/08/2021 [facility]    • Forward Reason: 07/08/2021 Insurance    • Specimen Sent: 07/08/2021 Urine    • Specimen Sent (2nd): 07/08/2021 Blood    • Specimen Sent (3rd): 07/08/2021 Blood    • Specimen Sent (4th): 07/08/2021 Blood    Hospital Outpatient Visit on 04/07/2021   Component Date Value   • Significant Indicator 04/07/2021 NEG    • Source 04/07/2021 WND    • Site 04/07/2021 BENEATH BREASTS    • Culture Result 04/07/2021 No Anaerobes isolated.    • Significant Indicator 04/07/2021 POS*   • Source 04/07/2021 WND    • Site 04/07/2021 BENEATH BREASTS    • Culture Result 04/07/2021  Moderate growth usual skin felipe.*   • Gram Stain Result 04/07/2021                      Value:Rare Gram positive cocci.  Rare Gram positive rods.     • Culture Result 04/07/2021 *                    Value:Streptococcus agalactiae (Group B)  Rare growth  Group B Streptococci are susceptible to ampicillin,  penicillin and cefazolin but may be erythromycin and/or  clindamycin resistant.  Notify Microbiology if erythromycin  or clindamycin testing is required.     • Significant Indicator 04/07/2021 .    • Source 04/07/2021 WND    • Site 04/07/2021 BENEATH BREASTS    • Gram Stain Result 04/07/2021                      Value:Rare Gram positive cocci.  Rare Gram positive rods.     ]

## 2022-01-03 ENCOUNTER — TELEPHONE (OUTPATIENT)
Dept: MEDICAL GROUP | Facility: CLINIC | Age: 42
End: 2022-01-03

## 2022-01-04 DIAGNOSIS — G44.209 TENSION HEADACHE: ICD-10-CM

## 2022-01-04 RX ORDER — BACLOFEN 20 MG/1
TABLET ORAL
Qty: 60 TABLET | Refills: 0 | Status: SHIPPED | OUTPATIENT
Start: 2022-01-04 | End: 2022-01-06 | Stop reason: SDUPTHER

## 2022-01-04 NOTE — TELEPHONE ENCOUNTER
Was the patient seen in the last year in this department? Yes    Does patient have an active prescription for medications requested? Yes    Received Request Via: Pharmacy    Hospital Outpatient Visit on 07/19/2021   Component Date Value   • Forwarded to Lab: 07/19/2021 [facility]    • Forward Reason: 07/19/2021 Insurance    • Specimen Sent: 07/19/2021 Urine    Office Visit on 07/19/2021   Component Date Value   • POC Color 07/19/2021 yellow    • POC Appearance 07/19/2021 cloudy    • POC Leukocyte Esterase 07/19/2021 small    • POC Nitrites 07/19/2021 neg    • POC Urobiligen 07/19/2021 0.2    • POC Protein 07/19/2021 neg    • POC Urine PH 07/19/2021 5.5    • POC Blood 07/19/2021 trace    • POC Specific Gravity 07/19/2021 1.005    • POC Ketones 07/19/2021 neg    • POC Bilirubin 07/19/2021 neg    • POC Glucose 07/19/2021 500    Orders Only on 07/07/2021   Component Date Value   • Glucose 07/07/2021 77    • Bun 07/07/2021 22    • Creatinine 07/07/2021 0.86    • GFR If Non  Ameri* 07/07/2021 84    • GFR If  07/07/2021 96    • Bun-Creatinine Ratio 07/07/2021 26*   • Sodium 07/07/2021 138    • Potassium 07/07/2021 4.5    • Chloride 07/07/2021 102    • Co2 07/07/2021 22    • Calcium 07/07/2021 9.5    • Total Protein 07/07/2021 6.8    • Albumin 07/07/2021 3.6*   • Globulin 07/07/2021 3.2    • A-G Ratio 07/07/2021 1.1*   • Total Bilirubin 07/07/2021 0.2    • Alkaline Phosphatase 07/07/2021 110    • AST(SGOT) 07/07/2021 18    • ALT(SGPT) 07/07/2021 25    • Specific Gravity 07/07/2021 >=1.030*   • Ph 07/07/2021 6.0    • Color 07/07/2021 Yellow    • Character 07/07/2021 Turbid*   • Leukocyte Esterase 07/07/2021 2+*   • Protein 07/07/2021 2+*   • Glucose 07/07/2021 3+*   • Ketones 07/07/2021 Negative    • Occult Blood 07/07/2021 1+*   • Bilirubin 07/07/2021 Negative    • Urobilinogen Semi Qnt 07/07/2021 0.2    • Nitrite 07/07/2021 Negative    • Microscopic Exam 07/07/2021 See below:    • Microscopic Exam  07/07/2021 CANCELED    • Urinalysis Reflex 07/07/2021 Comment    • WBC 07/07/2021 >30*   • RBC 07/07/2021 3-10*   • Epithelial Cells Non Ovidio* 07/07/2021 >10*   • Epithelial Cells Renal 07/07/2021 CANCELED    • Urine Casts 07/07/2021 None seen    • Cast Type 07/07/2021 CANCELED    • Urine Crystals 07/07/2021 CANCELED    • Crystal Type 07/07/2021 CANCELED    • Mucous Threads 07/07/2021 Present    • Bacteria 07/07/2021 Many*   • Yeast Cells 07/07/2021 Present*   • Trichomonas 07/07/2021 CANCELED    • Comment 07/07/2021 CANCELED    • Urine Culture 07/07/2021 Final report*   • Result 1 07/07/2021 Escherichia coli*   • Result 2 07/07/2021 Comment    • Susceptibility 07/07/2021 Comment    • Cholesterol,Tot 07/07/2021 174    • Triglycerides 07/07/2021 299*   • HDL 07/07/2021 38*   • VLDL Cholesterol Calc 07/07/2021 49*   • LDL Chol Calc ([facility]) 07/07/2021 87    • Comment: 07/07/2021 CANCELED    • Creatinine, Random Urine 07/07/2021 71.6    • Microalbumin, Urine Rand* 07/07/2021 305.4    • Albumin / Creatinine Rat* 07/07/2021 427*   • Glycohemoglobin 07/07/2021 12.0*   • TSH 07/07/2021 2.210    • Free T-4 07/07/2021 2.41*   • 25-Hydroxy   Vitamin D 25 07/07/2021 19.4*   • Paul Peters CMP14 [place]* 07/07/2021 Comment    • Paul Peters LP Default 07/07/2021 Comment    Hospital Outpatient Visit on 07/07/2021   Component Date Value   • Forwarded to Lab: 07/08/2021 [facility]    • Forward Reason: 07/08/2021 Insurance    • Specimen Sent: 07/08/2021 Urine    • Specimen Sent (2nd): 07/08/2021 Blood    • Specimen Sent (3rd): 07/08/2021 Blood    • Specimen Sent (4th): 07/08/2021 Blood    Hospital Outpatient Visit on 04/07/2021   Component Date Value   • Significant Indicator 04/07/2021 NEG    • Source 04/07/2021 WND    • Site 04/07/2021 BENEATH BREASTS    • Culture Result 04/07/2021 No Anaerobes isolated.    • Significant Indicator 04/07/2021 POS*   • Source 04/07/2021 WND    • Site 04/07/2021 BENEATH BREASTS    • Culture Result 04/07/2021  Moderate growth usual skin felipe.*   • Gram Stain Result 04/07/2021                      Value:Rare Gram positive cocci.  Rare Gram positive rods.     • Culture Result 04/07/2021 *                    Value:Streptococcus agalactiae (Group B)  Rare growth  Group B Streptococci are susceptible to ampicillin,  penicillin and cefazolin but may be erythromycin and/or  clindamycin resistant.  Notify Microbiology if erythromycin  or clindamycin testing is required.     • Significant Indicator 04/07/2021 .    • Source 04/07/2021 WND    • Site 04/07/2021 BENEATH BREASTS    • Gram Stain Result 04/07/2021                      Value:Rare Gram positive cocci.  Rare Gram positive rods.     ]

## 2022-01-04 NOTE — TELEPHONE ENCOUNTER
DOCUMENTATION OF PAR STATUS:    1. Name of Medication & Dose: Humalog  Kwikpen     2. Name of Prescription Coverage Company & phone #: Medicare     3. Date Prior Auth Submitted: 01.03.22    4. What information was given to obtain insurance decision? Dx code, prior medication     5. Prior Auth Status? Approved    6. Patient Notified: N\A

## 2022-01-06 ENCOUNTER — TELEMEDICINE (OUTPATIENT)
Dept: MEDICAL GROUP | Facility: CLINIC | Age: 42
End: 2022-01-06
Payer: MEDICARE

## 2022-01-06 VITALS — BODY MASS INDEX: 51.91 KG/M2 | WEIGHT: 293 LBS | HEIGHT: 63 IN

## 2022-01-06 DIAGNOSIS — G44.209 TENSION HEADACHE: ICD-10-CM

## 2022-01-06 DIAGNOSIS — E11.40 TYPE 2 DIABETES MELLITUS WITH DIABETIC NEUROPATHY, WITH LONG-TERM CURRENT USE OF INSULIN (HCC): ICD-10-CM

## 2022-01-06 DIAGNOSIS — Z79.4 TYPE 2 DIABETES MELLITUS WITH DIABETIC NEUROPATHY, WITH LONG-TERM CURRENT USE OF INSULIN (HCC): ICD-10-CM

## 2022-01-06 PROCEDURE — 99213 OFFICE O/P EST LOW 20 MIN: CPT | Mod: 95,CR | Performed by: PHYSICIAN ASSISTANT

## 2022-01-06 RX ORDER — BACLOFEN 20 MG/1
20 TABLET ORAL 2 TIMES DAILY
Qty: 60 TABLET | Refills: 2 | Status: SHIPPED | OUTPATIENT
Start: 2022-01-06 | End: 2022-05-18

## 2022-01-06 RX ORDER — PROCHLORPERAZINE 25 MG/1
1 SUPPOSITORY RECTAL
Qty: 9 EACH | Refills: 0 | Status: SHIPPED | OUTPATIENT
Start: 2022-01-06
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
## 2022-01-26 ENCOUNTER — TELEPHONE (OUTPATIENT)
Dept: MEDICAL GROUP | Facility: CLINIC | Age: 42
End: 2022-01-26

## 2022-01-26 DIAGNOSIS — R21 RASH: ICD-10-CM

## 2022-01-26 DIAGNOSIS — L30.4 INTERTRIGO: ICD-10-CM

## 2022-01-26 DIAGNOSIS — I10 ESSENTIAL HYPERTENSION: ICD-10-CM

## 2022-01-26 DIAGNOSIS — E03.9 ACQUIRED HYPOTHYROIDISM: ICD-10-CM

## 2022-01-26 RX ORDER — LEVOTHYROXINE SODIUM 0.1 MG/1
100 TABLET ORAL
Qty: 90 TABLET | Refills: 0 | Status: SHIPPED | OUTPATIENT
Start: 2022-01-26 | End: 2022-02-07 | Stop reason: SDUPTHER

## 2022-01-26 RX ORDER — EMPAGLIFLOZIN 25 MG/1
1 TABLET, FILM COATED ORAL DAILY
Qty: 30 TABLET | Refills: 0 | Status: SHIPPED | OUTPATIENT
Start: 2022-01-26

## 2022-01-26 RX ORDER — NYSTATIN 100000 [USP'U]/G
POWDER TOPICAL
Qty: 160 G | Refills: 0 | Status: SHIPPED | OUTPATIENT
Start: 2022-01-26 | End: 2022-02-07 | Stop reason: SDUPTHER

## 2022-01-26 RX ORDER — LOSARTAN POTASSIUM 25 MG/1
50 TABLET ORAL DAILY
Qty: 180 TABLET | Refills: 0 | Status: SHIPPED | OUTPATIENT
Start: 2022-01-26 | End: 2022-02-07 | Stop reason: SDUPTHER

## 2022-01-26 RX ORDER — METFORMIN HYDROCHLORIDE 500 MG/1
TABLET, EXTENDED RELEASE ORAL
Qty: 60 TABLET | Refills: 0 | Status: SHIPPED | OUTPATIENT
Start: 2022-01-26 | End: 2022-02-07 | Stop reason: SDUPTHER

## 2022-01-26 RX ORDER — NYSTATIN 100000 [USP'U]/G
POWDER TOPICAL
Qty: 160 G | Refills: 0 | Status: CANCELLED | OUTPATIENT
Start: 2022-01-26

## 2022-01-26 RX ORDER — PIOGLITAZONEHYDROCHLORIDE 30 MG/1
TABLET ORAL
Qty: 30 TABLET | Refills: 0 | Status: SHIPPED | OUTPATIENT
Start: 2022-01-26 | End: 2022-02-07 | Stop reason: SDUPTHER

## 2022-01-26 NOTE — TELEPHONE ENCOUNTER
DOCUMENTATION OF PAR STATUS:    1. Name of Medication & Dose: Jardiance 25mg      2. Name of Prescription Coverage Company & phone #: Medicare    3. Date Prior Auth Submitted: 01.26.22    4. What information was given to obtain insurance decision? Dx code     5. Prior Auth Status?Pending    6. Patient Notified: N\A

## 2022-01-26 NOTE — TELEPHONE ENCOUNTER
Was the patient seen in the last year in this department? Yes    Does patient have an active prescription for medications requested? Yes    Received Request Via: Pharmacy    Hospital Outpatient Visit on 07/19/2021   Component Date Value   • Forwarded to Lab: 07/19/2021 [facility]    • Forward Reason: 07/19/2021 Insurance    • Specimen Sent: 07/19/2021 Urine    Office Visit on 07/19/2021   Component Date Value   • POC Color 07/19/2021 yellow    • POC Appearance 07/19/2021 cloudy    • POC Leukocyte Esterase 07/19/2021 small    • POC Nitrites 07/19/2021 neg    • POC Urobiligen 07/19/2021 0.2    • POC Protein 07/19/2021 neg    • POC Urine PH 07/19/2021 5.5    • POC Blood 07/19/2021 trace    • POC Specific Gravity 07/19/2021 1.005    • POC Ketones 07/19/2021 neg    • POC Bilirubin 07/19/2021 neg    • POC Glucose 07/19/2021 500    Orders Only on 07/07/2021   Component Date Value   • Glucose 07/07/2021 77    • Bun 07/07/2021 22    • Creatinine 07/07/2021 0.86    • GFR If Non  Ameri* 07/07/2021 84    • GFR If  07/07/2021 96    • Bun-Creatinine Ratio 07/07/2021 26*   • Sodium 07/07/2021 138    • Potassium 07/07/2021 4.5    • Chloride 07/07/2021 102    • Co2 07/07/2021 22    • Calcium 07/07/2021 9.5    • Total Protein 07/07/2021 6.8    • Albumin 07/07/2021 3.6*   • Globulin 07/07/2021 3.2    • A-G Ratio 07/07/2021 1.1*   • Total Bilirubin 07/07/2021 0.2    • Alkaline Phosphatase 07/07/2021 110    • AST(SGOT) 07/07/2021 18    • ALT(SGPT) 07/07/2021 25    • Specific Gravity 07/07/2021 >=1.030*   • Ph 07/07/2021 6.0    • Color 07/07/2021 Yellow    • Character 07/07/2021 Turbid*   • Leukocyte Esterase 07/07/2021 2+*   • Protein 07/07/2021 2+*   • Glucose 07/07/2021 3+*   • Ketones 07/07/2021 Negative    • Occult Blood 07/07/2021 1+*   • Bilirubin 07/07/2021 Negative    • Urobilinogen Semi Qnt 07/07/2021 0.2    • Nitrite 07/07/2021 Negative    • Microscopic Exam 07/07/2021 See below:    • Microscopic Exam  07/07/2021 CANCELED    • Urinalysis Reflex 07/07/2021 Comment    • WBC 07/07/2021 >30*   • RBC 07/07/2021 3-10*   • Epithelial Cells Non Ovidio* 07/07/2021 >10*   • Epithelial Cells Renal 07/07/2021 CANCELED    • Urine Casts 07/07/2021 None seen    • Cast Type 07/07/2021 CANCELED    • Urine Crystals 07/07/2021 CANCELED    • Crystal Type 07/07/2021 CANCELED    • Mucous Threads 07/07/2021 Present    • Bacteria 07/07/2021 Many*   • Yeast Cells 07/07/2021 Present*   • Trichomonas 07/07/2021 CANCELED    • Comment 07/07/2021 CANCELED    • Urine Culture 07/07/2021 Final report*   • Result 1 07/07/2021 Escherichia coli*   • Result 2 07/07/2021 Comment    • Susceptibility 07/07/2021 Comment    • Cholesterol,Tot 07/07/2021 174    • Triglycerides 07/07/2021 299*   • HDL 07/07/2021 38*   • VLDL Cholesterol Calc 07/07/2021 49*   • LDL Chol Calc ([facility] [place]) 07/07/2021 87    • Comment: 07/07/2021 CANCELED    • Creatinine, Random Urine 07/07/2021 71.6    • Microalbumin, Urine Rand* 07/07/2021 305.4    • Albumin / Creatinine Rat* 07/07/2021 427*   • Glycohemoglobin 07/07/2021 12.0*   • TSH 07/07/2021 2.210    • Free T-4 07/07/2021 2.41*   • 25-Hydroxy   Vitamin D 25 07/07/2021 19.4*   • Paul Peters CMP14 [place]* 07/07/2021 Comment    • Paul Peters LP Default 07/07/2021 Comment    Hospital Outpatient Visit on 07/07/2021   Component Date Value   • Forwarded to Lab: 07/08/2021 [facility]    • Forward Reason: 07/08/2021 Insurance    • Specimen Sent: 07/08/2021 Urine    • Specimen Sent (2nd): 07/08/2021 Blood    • Specimen Sent (3rd): 07/08/2021 Blood    • Specimen Sent (4th): 07/08/2021 Blood    Hospital Outpatient Visit on 04/07/2021   Component Date Value   • Significant Indicator 04/07/2021 NEG    • Source 04/07/2021 WND    • Site 04/07/2021 BENEATH BREASTS    • Culture Result 04/07/2021 No Anaerobes isolated.    • Significant Indicator 04/07/2021 POS*   • Source 04/07/2021 WND    • Site 04/07/2021 BENEATH BREASTS    • Culture Result 04/07/2021  Moderate growth usual skin felipe.*   • Gram Stain Result 04/07/2021                      Value:Rare Gram positive cocci.  Rare Gram positive rods.     • Culture Result 04/07/2021 *                    Value:Streptococcus agalactiae (Group B)  Rare growth  Group B Streptococci are susceptible to ampicillin,  penicillin and cefazolin but may be erythromycin and/or  clindamycin resistant.  Notify Microbiology if erythromycin  or clindamycin testing is required.     • Significant Indicator 04/07/2021 .    • Source 04/07/2021 WND    • Site 04/07/2021 BENEATH BREASTS    • Gram Stain Result 04/07/2021                      Value:Rare Gram positive cocci.  Rare Gram positive rods.     ]

## 2022-01-26 NOTE — TELEPHONE ENCOUNTER
Was the patient seen in the last year in this department? Yes    Does patient have an active prescription for medications requested? Yes    Received Request Via: Pharmacy    Hospital Outpatient Visit on 07/19/2021   Component Date Value   • Forwarded to Lab: 07/19/2021 [facility]    • Forward Reason: 07/19/2021 Insurance    • Specimen Sent: 07/19/2021 Urine    Office Visit on 07/19/2021   Component Date Value   • POC Color 07/19/2021 yellow    • POC Appearance 07/19/2021 cloudy    • POC Leukocyte Esterase 07/19/2021 small    • POC Nitrites 07/19/2021 neg    • POC Urobiligen 07/19/2021 0.2    • POC Protein 07/19/2021 neg    • POC Urine PH 07/19/2021 5.5    • POC Blood 07/19/2021 trace    • POC Specific Gravity 07/19/2021 1.005    • POC Ketones 07/19/2021 neg    • POC Bilirubin 07/19/2021 neg    • POC Glucose 07/19/2021 500    Orders Only on 07/07/2021   Component Date Value   • Glucose 07/07/2021 77    • Bun 07/07/2021 22    • Creatinine 07/07/2021 0.86    • GFR If Non  Ameri* 07/07/2021 84    • GFR If  07/07/2021 96    • Bun-Creatinine Ratio 07/07/2021 26*   • Sodium 07/07/2021 138    • Potassium 07/07/2021 4.5    • Chloride 07/07/2021 102    • Co2 07/07/2021 22    • Calcium 07/07/2021 9.5    • Total Protein 07/07/2021 6.8    • Albumin 07/07/2021 3.6*   • Globulin 07/07/2021 3.2    • A-G Ratio 07/07/2021 1.1*   • Total Bilirubin 07/07/2021 0.2    • Alkaline Phosphatase 07/07/2021 110    • AST(SGOT) 07/07/2021 18    • ALT(SGPT) 07/07/2021 25    • Specific Gravity 07/07/2021 >=1.030*   • Ph 07/07/2021 6.0    • Color 07/07/2021 Yellow    • Character 07/07/2021 Turbid*   • Leukocyte Esterase 07/07/2021 2+*   • Protein 07/07/2021 2+*   • Glucose 07/07/2021 3+*   • Ketones 07/07/2021 Negative    • Occult Blood 07/07/2021 1+*   • Bilirubin 07/07/2021 Negative    • Urobilinogen Semi Qnt 07/07/2021 0.2    • Nitrite 07/07/2021 Negative    • Microscopic Exam 07/07/2021 See below:    • Microscopic Exam  07/07/2021 CANCELED    • Urinalysis Reflex 07/07/2021 Comment    • WBC 07/07/2021 >30*   • RBC 07/07/2021 3-10*   • Epithelial Cells Non Ovidio* 07/07/2021 >10*   • Epithelial Cells Renal 07/07/2021 CANCELED    • Urine Casts 07/07/2021 None seen    • Cast Type 07/07/2021 CANCELED    • Urine Crystals 07/07/2021 CANCELED    • Crystal Type 07/07/2021 CANCELED    • Mucous Threads 07/07/2021 Present    • Bacteria 07/07/2021 Many*   • Yeast Cells 07/07/2021 Present*   • Trichomonas 07/07/2021 CANCELED    • Comment 07/07/2021 CANCELED    • Urine Culture 07/07/2021 Final report*   • Result 1 07/07/2021 Escherichia coli*   • Result 2 07/07/2021 Comment    • Susceptibility 07/07/2021 Comment    • Cholesterol,Tot 07/07/2021 174    • Triglycerides 07/07/2021 299*   • HDL 07/07/2021 38*   • VLDL Cholesterol Calc 07/07/2021 49*   • LDL Chol Calc ([facility]) 07/07/2021 87    • Comment: 07/07/2021 CANCELED    • Creatinine, Random Urine 07/07/2021 71.6    • Microalbumin, Urine Rand* 07/07/2021 305.4    • Albumin / Creatinine Rat* 07/07/2021 427*   • Glycohemoglobin 07/07/2021 12.0*   • TSH 07/07/2021 2.210    • Free T-4 07/07/2021 2.41*   • 25-Hydroxy   Vitamin D 25 07/07/2021 19.4*   • Paul Peters CMP14 Defau* 07/07/2021 Comment    • Paul Peters LP Default 07/07/2021 Comment    Hospital Outpatient Visit on 07/07/2021   Component Date Value   • Forwarded to Lab: 07/08/2021 [facility]    • Forward Reason: 07/08/2021 Insurance    • Specimen Sent: 07/08/2021 Urine    • Specimen Sent (2nd): 07/08/2021 Blood    • Specimen Sent (3rd): 07/08/2021 Blood    • Specimen Sent (4th): 07/08/2021 Blood    Hospital Outpatient Visit on 04/07/2021   Component Date Value   • Significant Indicator 04/07/2021 NEG    • Source 04/07/2021 WND    • Site 04/07/2021 BENEATH BREASTS    • Culture Result 04/07/2021 No Anaerobes isolated.    • Significant Indicator 04/07/2021 POS*   • Source 04/07/2021 WND    • Site 04/07/2021 BENEATH BREASTS    • Culture Result 04/07/2021  Moderate growth usual skin felipe.*   • Gram Stain Result 04/07/2021                      Value:Rare Gram positive cocci.  Rare Gram positive rods.     • Culture Result 04/07/2021 *                    Value:Streptococcus agalactiae (Group B)  Rare growth  Group B Streptococci are susceptible to ampicillin,  penicillin and cefazolin but may be erythromycin and/or  clindamycin resistant.  Notify Microbiology if erythromycin  or clindamycin testing is required.     • Significant Indicator 04/07/2021 .    • Source 04/07/2021 WND    • Site 04/07/2021 BENEATH BREASTS    • Gram Stain Result 04/07/2021                      Value:Rare Gram positive cocci.  Rare Gram positive rods.     ]

## 2022-02-07 ENCOUNTER — PATIENT MESSAGE (OUTPATIENT)
Dept: MEDICAL GROUP | Facility: PHYSICIAN GROUP | Age: 42
End: 2022-02-07

## 2022-02-07 DIAGNOSIS — E03.9 ACQUIRED HYPOTHYROIDISM: ICD-10-CM

## 2022-02-07 DIAGNOSIS — E11.40 TYPE 2 DIABETES MELLITUS WITH DIABETIC NEUROPATHY, WITH LONG-TERM CURRENT USE OF INSULIN (HCC): ICD-10-CM

## 2022-02-07 DIAGNOSIS — L30.4 INTERTRIGO: ICD-10-CM

## 2022-02-07 DIAGNOSIS — I10 ESSENTIAL HYPERTENSION: ICD-10-CM

## 2022-02-07 DIAGNOSIS — E11.3311 MODERATE NONPROLIFERATIVE DIABETIC RETINOPATHY OF RIGHT EYE WITH MACULAR EDEMA ASSOCIATED WITH TYPE 2 DIABETES MELLITUS (HCC): ICD-10-CM

## 2022-02-07 DIAGNOSIS — R21 RASH: ICD-10-CM

## 2022-02-07 DIAGNOSIS — Z79.4 TYPE 2 DIABETES MELLITUS WITH DIABETIC NEUROPATHY, WITH LONG-TERM CURRENT USE OF INSULIN (HCC): ICD-10-CM

## 2022-02-07 RX ORDER — NYSTATIN 100000 [USP'U]/G
POWDER TOPICAL
Qty: 160 G | Refills: 0 | Status: SHIPPED | OUTPATIENT
Start: 2022-02-07 | End: 2022-02-08 | Stop reason: SDUPTHER

## 2022-02-07 RX ORDER — LEVOTHYROXINE SODIUM 0.1 MG/1
100 TABLET ORAL
Qty: 90 TABLET | Refills: 0 | Status: SHIPPED | OUTPATIENT
Start: 2022-02-07 | End: 2022-05-09 | Stop reason: SDUPTHER

## 2022-02-07 RX ORDER — PIOGLITAZONEHYDROCHLORIDE 30 MG/1
TABLET ORAL
Qty: 30 TABLET | Refills: 0 | Status: SHIPPED | OUTPATIENT
Start: 2022-02-07

## 2022-02-07 RX ORDER — LOSARTAN POTASSIUM 25 MG/1
50 TABLET ORAL DAILY
Qty: 180 TABLET | Refills: 0 | Status: SHIPPED | OUTPATIENT
Start: 2022-02-07 | End: 2022-05-09 | Stop reason: SDUPTHER

## 2022-02-07 RX ORDER — INSULIN DEGLUDEC 200 U/ML
INJECTION, SOLUTION SUBCUTANEOUS
OUTPATIENT
Start: 2022-02-07

## 2022-02-07 RX ORDER — METFORMIN HYDROCHLORIDE 500 MG/1
TABLET, EXTENDED RELEASE ORAL
Qty: 60 TABLET | Refills: 0 | Status: SHIPPED | OUTPATIENT
Start: 2022-02-07

## 2022-02-07 NOTE — TELEPHONE ENCOUNTER
Was the patient seen in the last year in this department? 1/6/22    Does patient have an active prescription for medications requested? Yes    Received Request Via: Pharmacy    Hospital Outpatient Visit on 07/19/2021   Component Date Value   • Forwarded to Lab: 07/19/2021 [facility]    • Forward Reason: 07/19/2021 Insurance    • Specimen Sent: 07/19/2021 Urine    Office Visit on 07/19/2021   Component Date Value   • POC Color 07/19/2021 yellow    • POC Appearance 07/19/2021 cloudy    • POC Leukocyte Esterase 07/19/2021 small    • POC Nitrites 07/19/2021 neg    • POC Urobiligen 07/19/2021 0.2    • POC Protein 07/19/2021 neg    • POC Urine PH 07/19/2021 5.5    • POC Blood 07/19/2021 trace    • POC Specific Gravity 07/19/2021 1.005    • POC Ketones 07/19/2021 neg    • POC Bilirubin 07/19/2021 neg    • POC Glucose 07/19/2021 500    Orders Only on 07/07/2021   Component Date Value   • Glucose 07/07/2021 77    • Bun 07/07/2021 22    • Creatinine 07/07/2021 0.86    • GFR If Non  Ameri* 07/07/2021 84    • GFR If  07/07/2021 96    • Bun-Creatinine Ratio 07/07/2021 26*   • Sodium 07/07/2021 138    • Potassium 07/07/2021 4.5    • Chloride 07/07/2021 102    • Co2 07/07/2021 22    • Calcium 07/07/2021 9.5    • Total Protein 07/07/2021 6.8    • Albumin 07/07/2021 3.6*   • Globulin 07/07/2021 3.2    • A-G Ratio 07/07/2021 1.1*   • Total Bilirubin 07/07/2021 0.2    • Alkaline Phosphatase 07/07/2021 110    • AST(SGOT) 07/07/2021 18    • ALT(SGPT) 07/07/2021 25    • Specific Gravity 07/07/2021 >=1.030*   • Ph 07/07/2021 6.0    • Color 07/07/2021 Yellow    • Character 07/07/2021 Turbid*   • Leukocyte Esterase 07/07/2021 2+*   • Protein 07/07/2021 2+*   • Glucose 07/07/2021 3+*   • Ketones 07/07/2021 Negative    • Occult Blood 07/07/2021 1+*   • Bilirubin 07/07/2021 Negative    • Urobilinogen Semi Qnt 07/07/2021 0.2    • Nitrite 07/07/2021 Negative    • Microscopic Exam 07/07/2021 See below:    • Microscopic Exam  07/07/2021 CANCELED    • Urinalysis Reflex 07/07/2021 Comment    • WBC 07/07/2021 >30*   • RBC 07/07/2021 3-10*   • Epithelial Cells Non Ovidio* 07/07/2021 >10*   • Epithelial Cells Renal 07/07/2021 CANCELED    • Urine Casts 07/07/2021 None seen    • Cast Type 07/07/2021 CANCELED    • Urine Crystals 07/07/2021 CANCELED    • Crystal Type 07/07/2021 CANCELED    • Mucous Threads 07/07/2021 Present    • Bacteria 07/07/2021 Many*   • Yeast Cells 07/07/2021 Present*   • Trichomonas 07/07/2021 CANCELED    • Comment 07/07/2021 CANCELED    • Urine Culture 07/07/2021 Final report*   • Result 1 07/07/2021 Escherichia coli*   • Result 2 07/07/2021 Comment    • Susceptibility 07/07/2021 Comment    • Cholesterol,Tot 07/07/2021 174    • Triglycerides 07/07/2021 299*   • HDL 07/07/2021 38*   • VLDL Cholesterol Calc 07/07/2021 49*   • LDL Chol Calc ([facility]) 07/07/2021 87    • Comment: 07/07/2021 CANCELED    • Creatinine, Random Urine 07/07/2021 71.6    • Microalbumin, Urine Rand* 07/07/2021 305.4    • Albumin / Creatinine Rat* 07/07/2021 427*   • Glycohemoglobin 07/07/2021 12.0*   • TSH 07/07/2021 2.210    • Free T-4 07/07/2021 2.41*   • 25-Hydroxy   Vitamin D 25 07/07/2021 19.4*   • Paul Peters CMP14 Defau* 07/07/2021 Comment    • Paul Peters LP Default 07/07/2021 Comment    Hospital Outpatient Visit on 07/07/2021   Component Date Value   • Forwarded to Lab: 07/08/2021 [facility]    • Forward Reason: 07/08/2021 Insurance    • Specimen Sent: 07/08/2021 Urine    • Specimen Sent (2nd): 07/08/2021 Blood    • Specimen Sent (3rd): 07/08/2021 Blood    • Specimen Sent (4th): 07/08/2021 Blood    Hospital Outpatient Visit on 04/07/2021   Component Date Value   • Significant Indicator 04/07/2021 NEG    • Source 04/07/2021 WND    • Site 04/07/2021 BENEATH BREASTS    • Culture Result 04/07/2021 No Anaerobes isolated.    • Significant Indicator 04/07/2021 POS*   • Source 04/07/2021 WND    • Site 04/07/2021 BENEATH BREASTS    • Culture Result 04/07/2021  Moderate growth usual skin felipe.*   • Gram Stain Result 04/07/2021                      Value:Rare Gram positive cocci.  Rare Gram positive rods.     • Culture Result 04/07/2021 *                    Value:Streptococcus agalactiae (Group B)  Rare growth  Group B Streptococci are susceptible to ampicillin,  penicillin and cefazolin but may be erythromycin and/or  clindamycin resistant.  Notify Microbiology if erythromycin  or clindamycin testing is required.     • Significant Indicator 04/07/2021 .    • Source 04/07/2021 WND    • Site 04/07/2021 BENEATH BREASTS    • Gram Stain Result 04/07/2021                      Value:Rare Gram positive cocci.  Rare Gram positive rods.

## 2022-02-08 RX ORDER — NYSTATIN 100000 [USP'U]/G
POWDER TOPICAL
Qty: 160 G | Refills: 0 | Status: SHIPPED | OUTPATIENT
Start: 2022-02-08 | End: 2022-02-11 | Stop reason: SDUPTHER

## 2022-02-11 RX ORDER — NYSTATIN 100000 [USP'U]/G
POWDER TOPICAL
Qty: 160 G | Refills: 0 | Status: SHIPPED | OUTPATIENT
Start: 2022-02-11 | End: 2022-03-01 | Stop reason: SDUPTHER

## 2022-02-15 ENCOUNTER — TELEMEDICINE (OUTPATIENT)
Dept: MEDICAL GROUP | Facility: CLINIC | Age: 42
End: 2022-02-15
Payer: MEDICARE

## 2022-02-15 VITALS — WEIGHT: 293 LBS | BODY MASS INDEX: 51.91 KG/M2 | HEIGHT: 63 IN

## 2022-02-15 DIAGNOSIS — J45.41 MODERATE PERSISTENT ASTHMA WITH ACUTE EXACERBATION: ICD-10-CM

## 2022-02-15 DIAGNOSIS — J40 BRONCHITIS: ICD-10-CM

## 2022-02-15 DIAGNOSIS — Z12.31 ENCOUNTER FOR SCREENING MAMMOGRAM FOR BREAST CANCER: ICD-10-CM

## 2022-02-15 PROBLEM — Z87.828 HISTORY OF MOTOR VEHICLE ACCIDENT: Status: RESOLVED | Noted: 2018-05-17 | Resolved: 2022-02-15

## 2022-02-15 PROCEDURE — 99213 OFFICE O/P EST LOW 20 MIN: CPT | Mod: 95,CR | Performed by: PHYSICIAN ASSISTANT

## 2022-02-15 RX ORDER — ALBUTEROL SULFATE 2.5 MG/3ML
2.5 SOLUTION RESPIRATORY (INHALATION) EVERY 4 HOURS PRN
Qty: 100 ML | Refills: 2 | Status: SHIPPED | OUTPATIENT
Start: 2022-02-15 | End: 2022-08-17 | Stop reason: SDUPTHER

## 2022-02-15 RX ORDER — AMOXICILLIN AND CLAVULANATE POTASSIUM 875; 125 MG/1; MG/1
1 TABLET, FILM COATED ORAL 2 TIMES DAILY
Qty: 20 TABLET | Refills: 0 | Status: SHIPPED
Start: 2022-02-15 | End: 2022-04-26

## 2022-02-15 NOTE — PROGRESS NOTES
Virtual Visit: Established Patient   This visit was conducted via Zoom using secure and encrypted videoconferencing technology.   The patient was in their home in the state of [place].    The patient's identity was confirmed and verbal consent was obtained for this virtual visit.    Subjective:   CC:   Chief Complaint   Patient presents with   • Referral Needed     mammo      Tarah Britton is a 41 y.o. female presenting for evaluation and management of:    mammo referral.  Patient states that she is needing a referral to have her mammogram done. Patient states that she would like for her order to be sent to [facility].      Patient states that she has had cough for one month usually at night, but has been occurring in the day.  It is not better but it is not worse.  She has tried cough syrup, cough drops.  She has been negative for covid.  She has not been on antibiotic. She has albuterol but she is out of the nebulizer.     ROS   Denies any other symptoms unless previously indicated.     Current medicines (including changes today)  Current Outpatient Medications   Medication Sig Dispense Refill   • amoxicillin-clavulanate (AUGMENTIN) 875-125 MG Tab Take 1 Tablet by mouth 2 times a day. 20 Tablet 0   • albuterol (PROVENTIL) 2.5mg/3ml Nebu Soln solution for nebulization Take 3 mL by nebulization every four hours as needed for Shortness of Breath. 100 mL 2   • nystatin (MYCOSTATIN) powder Apply to beneath breasts and to skin folds twice a day 160 g 0   • losartan (COZAAR) 25 MG Tab Take 2 Tablets by mouth every day. 180 Tablet 0   • levothyroxine (SYNTHROID) 100 MCG Tab Take 1 Tablet by mouth every morning on an empty stomach. ON EMPTY STOMACH 90 Tablet 0   • pioglitazone (ACTOS) 30 MG Tab TAKE ONE TABLET BY MOUTH DAILY 30 Tablet 0   • metFORMIN ER (GLUCOPHAGE XR) 500 MG TABLET SR 24 HR TAKE TWO TABLETS BY MOUTH TWICE A DAY 60 Tablet 0   • Empagliflozin (JARDIANCE) 25 MG Tab Take 1 Tablet by mouth every day. TAKE  ONE TABLET BY MOUTH DAILY 30 Tablet 0   • baclofen (LIORESAL) 20 MG tablet Take 1 Tablet by mouth 2 times a day. 60 Tablet 2   • Continuous Blood Gluc Sensor (DEXCOM G6 SENSOR) Misc 1 Each every 10 days. 9 Each 0   • hydrocortisone rectal (ANUSOL-HC) 2.5% Cream Apply a small amount to affected area twice a day no longer than 2 weeks 28 g 0   • Insulin Degludec (TRESIBA FLEXTOUCH) 200 UNIT/ML Solution Pen-injector Inject 70 Units under the skin at bedtime. 6 mL 0   • insulin lispro (HUMALOG,ADMELOG) 100 UNIT/ML Solution Pen-injector injection PEN Inject 11 Units under the skin 3 times a day before meals. 5 Each 0   • atorvastatin (LIPITOR) 40 MG Tab Take 1 Tablet by mouth every day. TAKE ONE TABLET BY MOUTH DAILY 90 Tablet 2   • Alcohol Swabs (PHARMACIST CHOICE ALCOHOL) Pads      • hydrocortisone 2.5 % Cream topical cream      • Insulin Aspart FlexPen 100 UNIT/ML Solution Pen-injector      • ibuprofen (MOTRIN) 800 MG Tab TAKE ONE TABLET BY MOUTH EVERY 8 HOURS AS NEEDED 30 Tablet 0   • gabapentin (NEURONTIN) 300 MG Cap TAKE TWO CAPSULES BY MOUTH TWICE A  Capsule 2   • Dulaglutide (TRULICITY) 1.5 MG/0.5ML Solution Pen-injector Inject 0.5 mL under the skin every 7 days. 2 mL 5   • EYLEA 2 MG/0.05ML Solution      • Insulin Pen Needle 32G X 4 MM Misc 1 Each by Does not apply route 4 times a day. 120 Each 11     No current facility-administered medications for this visit.       Patient Active Problem List    Diagnosis Date Noted   • Bronchitis 02/15/2022   • Dilated ventricle 09/09/2021   • Vitamin D deficiency 07/19/2021   • Acute cystitis with hematuria 07/19/2021   • Urge incontinence 07/07/2021   • Breast pain, left 07/07/2021   • Blood in stool 07/07/2021   • Encounter for vitamin deficiency screening 07/07/2021   • Tension headache 07/07/2021   • Walking difficulty due to ankle and foot 07/07/2021   • Rash 04/07/2021   • Breast lump 12/21/2020   • Intractable migraine with aura without status migrainosus  "11/03/2020   • Plantar wart 11/03/2020   • Other chest pain 11/03/2020   • Palpitations 11/03/2020   • Visit for screening mammogram 11/03/2020   • Generalized anxiety disorder 06/11/2019   • Learning disabilities 06/11/2019   • Moderate nonproliferative diabetic retinopathy associated with type 2 diabetes mellitus (HCC) 08/31/2018   • Acquired hypothyroidism 05/31/2018   • Moderate persistent asthma with acute exacerbation 05/17/2018   • Frequent headaches 05/17/2018   • Morbid obesity with BMI of 50.0-59.9, adult ([facility]) 01/29/2011   • Type 2 diabetes mellitus, with long-term current use of insulin ([facility])    • Mixed hyperlipidemia    • HTN (hypertension)    • Depression    • DESEAN on CPAP         Objective:   Ht 1.6 m (5' 3\") Comment: obtained from chart  Wt (!) 153 kg (337 lb) Comment: obtained from chart  BMI 59.70 kg/m²     Physical Exam:  Constitutional: Alert, no distress, well-groomed.  Skin: No rashes in visible areas.  Eye: Round. Conjunctiva clear, lids normal. No icterus.   ENMT: Lips pink without lesions, good dentition, moist mucous membranes. Phonation normal.  Neck: No masses, no thyromegaly. Moves freely without pain.  Respiratory: coughing and wheezing.  Psych: Alert and oriented x3, normal affect and mood.     Assessment and Plan:   The following treatment plan was discussed:     1. Encounter for screening mammogram for breast cancer  - MA-SCREENING MAMMO BILAT W/CAD; Future    Mammogram order placed and will be sent to [facility]     2. Bronchitis  - DX-CHEST-2 VIEWS; Future  - amoxicillin-clavulanate (AUGMENTIN) 875-125 MG Tab; Take 1 Tablet by mouth 2 times a day.  Dispense: 20 Tablet; Refill: 0  3. Moderate persistent asthma with acute exacerbation  - albuterol (PROVENTIL) 2.5mg/3ml Nebu Soln solution for nebulization; Take 3 mL by nebulization every four hours as needed for Shortness of Breath.  Dispense: 100 mL; Refill: 2    Will start patient on Augmentin, obtain a chest xray and refill " albuterol.  Follow up in office if not improved.     Follow-up: No follow-ups on file.

## 2022-02-17 ENCOUNTER — TELEPHONE (OUTPATIENT)
Dept: MEDICAL GROUP | Facility: CLINIC | Age: 42
End: 2022-02-17
Payer: MEDICARE

## 2022-02-17 NOTE — TELEPHONE ENCOUNTER
DOCUMENTATION OF PAR STATUS:    1. Name of Medication & Dose: Albuterol Sulfate (2.5 MG/3ML) 0.083% nebulizer solution    2. Name of Prescription Coverage Company & phone #: [employer]     3. Date Prior Auth Submitted: 2/17/22    4. What information was given to obtain insurance decision? Dose, Provider Information    5. Prior Auth Status? Pending    6. Patient Notified: N\A

## 2022-02-25 ENCOUNTER — TELEPHONE (OUTPATIENT)
Dept: MEDICAL GROUP | Facility: CLINIC | Age: 42
End: 2022-02-25
Payer: MEDICARE

## 2022-02-25 NOTE — TELEPHONE ENCOUNTER
DOCUMENTATION OF PAR STATUS:    1. Name of Medication & Dose:  Albuterol Sulfate 2.5MG/3ML     2. Name of Prescription Coverage Company & phone #: [employer]    3. Date Prior Auth Submitted: 2/21/22    4. What information was given to obtain insurance decision? All    5. Prior Auth Status? Deined    6. Patient Notified: N\A

## 2022-03-01 DIAGNOSIS — L30.4 INTERTRIGO: ICD-10-CM

## 2022-03-01 DIAGNOSIS — R21 RASH: ICD-10-CM

## 2022-03-01 RX ORDER — NYSTATIN 100000 [USP'U]/G
POWDER TOPICAL
Qty: 160 G | Refills: 0 | Status: SHIPPED | OUTPATIENT
Start: 2022-03-01 | End: 2022-03-28 | Stop reason: SDUPTHER

## 2022-03-01 RX ORDER — NYSTATIN 100000 [USP'U]/G
POWDER TOPICAL
Qty: 160 G | Refills: 0 | OUTPATIENT
Start: 2022-03-01

## 2022-03-01 NOTE — TELEPHONE ENCOUNTER
Received request via: Pharmacy    Was the patient seen in the last year in this department? Yes    Does the patient have an active prescription (recently filled or refills available) for medication(s) requested? No - 2/11/2022 Rx issued for 160 gm (2 bottles).  I sent a message to the patient asking if she had picked that up and used both bottles.  I also gave her the information again to schedule her appointment with Dermatology

## 2022-03-28 ENCOUNTER — PATIENT MESSAGE (OUTPATIENT)
Dept: MEDICAL GROUP | Facility: CLINIC | Age: 42
End: 2022-03-28
Payer: MEDICARE

## 2022-03-28 DIAGNOSIS — R21 RASH: ICD-10-CM

## 2022-03-28 DIAGNOSIS — L30.4 INTERTRIGO: ICD-10-CM

## 2022-03-28 RX ORDER — NYSTATIN 100000 [USP'U]/G
POWDER TOPICAL
Qty: 160 G | Refills: 0 | Status: SHIPPED | OUTPATIENT
Start: 2022-03-28 | End: 2022-04-26 | Stop reason: SDUPTHER

## 2022-04-06 ENCOUNTER — OFFICE VISIT (OUTPATIENT)
Dept: MEDICAL GROUP | Facility: CLINIC | Age: 42
End: 2022-04-06
Payer: MEDICARE

## 2022-04-06 VITALS
WEIGHT: 293 LBS | BODY MASS INDEX: 51.91 KG/M2 | OXYGEN SATURATION: 97 % | HEART RATE: 90 BPM | SYSTOLIC BLOOD PRESSURE: 120 MMHG | TEMPERATURE: 97.4 F | DIASTOLIC BLOOD PRESSURE: 70 MMHG | HEIGHT: 63 IN | RESPIRATION RATE: 18 BRPM

## 2022-04-06 DIAGNOSIS — N64.4 BREAST PAIN, LEFT: ICD-10-CM

## 2022-04-06 DIAGNOSIS — I36.1 NONRHEUMATIC TRICUSPID VALVE REGURGITATION: ICD-10-CM

## 2022-04-06 DIAGNOSIS — R07.89 OTHER CHEST PAIN: ICD-10-CM

## 2022-04-06 DIAGNOSIS — I10 PRIMARY HYPERTENSION: ICD-10-CM

## 2022-04-06 DIAGNOSIS — R94.30 ELEVATED RIGHT VENTRICULAR END-DIASTOLIC PRESSURE: ICD-10-CM

## 2022-04-06 DIAGNOSIS — N63.0 BREAST LUMP: ICD-10-CM

## 2022-04-06 PROCEDURE — 99213 OFFICE O/P EST LOW 20 MIN: CPT | Performed by: PHYSICIAN ASSISTANT

## 2022-04-06 RX ORDER — ESCITALOPRAM OXALATE 10 MG/1
10 TABLET ORAL DAILY
COMMUNITY
Start: 2022-02-02 | End: 2022-08-17 | Stop reason: SDUPTHER

## 2022-04-06 ASSESSMENT — PATIENT HEALTH QUESTIONNAIRE - PHQ9: CLINICAL INTERPRETATION OF PHQ2 SCORE: 0

## 2022-04-26 ENCOUNTER — OFFICE VISIT (OUTPATIENT)
Dept: MEDICAL GROUP | Facility: CLINIC | Age: 42
End: 2022-04-26
Payer: MEDICARE

## 2022-04-26 VITALS
HEIGHT: 64 IN | OXYGEN SATURATION: 96 % | BODY MASS INDEX: 50.02 KG/M2 | RESPIRATION RATE: 20 BRPM | WEIGHT: 293 LBS | TEMPERATURE: 97.5 F | SYSTOLIC BLOOD PRESSURE: 110 MMHG | DIASTOLIC BLOOD PRESSURE: 74 MMHG | HEART RATE: 93 BPM

## 2022-04-26 DIAGNOSIS — M25.561 ACUTE PAIN OF BOTH KNEES: ICD-10-CM

## 2022-04-26 DIAGNOSIS — E66.01 MORBID OBESITY WITH BMI OF 50.0-59.9, ADULT (HCC): ICD-10-CM

## 2022-04-26 DIAGNOSIS — M25.862 CYST OF KNEE JOINT, LEFT: ICD-10-CM

## 2022-04-26 DIAGNOSIS — M25.511 ACUTE PAIN OF RIGHT SHOULDER: ICD-10-CM

## 2022-04-26 DIAGNOSIS — M25.562 ACUTE PAIN OF BOTH KNEES: ICD-10-CM

## 2022-04-26 DIAGNOSIS — S40.811A ABRASION OF RIGHT UPPER EXTREMITY, INITIAL ENCOUNTER: ICD-10-CM

## 2022-04-26 PROBLEM — N30.01 ACUTE CYSTITIS WITH HEMATURIA: Status: RESOLVED | Noted: 2021-07-19 | Resolved: 2022-04-26

## 2022-04-26 PROCEDURE — 99213 OFFICE O/P EST LOW 20 MIN: CPT | Performed by: PHYSICIAN ASSISTANT

## 2022-04-26 RX ORDER — BACLOFEN 20 MG/1
1 TABLET ORAL PRN
COMMUNITY
Start: 2022-01-06 | End: 2022-04-26

## 2022-04-26 RX ORDER — CEPHALEXIN 250 MG/1
250 CAPSULE ORAL 4 TIMES DAILY
Qty: 28 CAPSULE | Refills: 0 | Status: SHIPPED | OUTPATIENT
Start: 2022-04-26 | End: 2022-05-03

## 2022-04-26 RX ORDER — NYSTATIN 100000 [USP'U]/G
POWDER TOPICAL
Qty: 160 G | Refills: 0 | Status: SHIPPED
Start: 2022-04-26 | End: 2022-11-17

## 2022-04-26 RX ORDER — INSULIN LISPRO 100 [IU]/ML
INJECTION, SOLUTION SUBCUTANEOUS
COMMUNITY
Start: 2022-04-07 | End: 2022-06-08

## 2022-04-29 ENCOUNTER — TELEPHONE (OUTPATIENT)
Dept: MEDICAL GROUP | Facility: CLINIC | Age: 42
End: 2022-04-29
Payer: MEDICARE

## 2022-04-29 NOTE — TELEPHONE ENCOUNTER
VOICEMAIL  1. Caller Name: [facility]              Call Back Number: [phone]    2. Message: [facility] contacting regarding the pt's mammogram. Pt stated to [facility] that she does not have a breast lump. On her referral it states that she does. Pt was not able to be seen because they could not do a mammogram if pt does not have lump. Pt will talk about this in her upcoming appointment on 5/5/22    3. Patient approves office to leave a detailed voicemail/MyChart message: N\A

## 2022-05-12 ENCOUNTER — APPOINTMENT (RX ONLY)
Dept: URBAN - METROPOLITAN AREA CLINIC 31 | Facility: CLINIC | Age: 42
Setting detail: DERMATOLOGY
End: 2022-05-12

## 2022-05-12 DIAGNOSIS — L30.4 ERYTHEMA INTERTRIGO: ICD-10-CM | Status: INADEQUATELY CONTROLLED

## 2022-05-12 PROCEDURE — ? TREATMENT REGIMEN

## 2022-05-12 PROCEDURE — 99202 OFFICE O/P NEW SF 15 MIN: CPT

## 2022-05-12 PROCEDURE — ? COUNSELING

## 2022-05-12 PROCEDURE — ? PRESCRIPTION

## 2022-05-12 RX ORDER — KETOCONAZOLE 20 MG/G
CREAM TOPICAL BID
Qty: 60 | Refills: 3 | Status: ERX | COMMUNITY
Start: 2022-05-12

## 2022-05-12 RX ADMIN — KETOCONAZOLE: 20 CREAM TOPICAL at 00:00

## 2022-05-12 ASSESSMENT — LOCATION DETAILED DESCRIPTION DERM
LOCATION DETAILED: LEFT INFRAMAMMARY CREASE (INNER QUADRANT)
LOCATION DETAILED: LEFT SUPRAPUBIC SKIN
LOCATION DETAILED: RIGHT SUPRAPUBIC SKIN

## 2022-05-12 ASSESSMENT — LOCATION SIMPLE DESCRIPTION DERM
LOCATION SIMPLE: GROIN
LOCATION SIMPLE: LEFT BREAST

## 2022-05-12 ASSESSMENT — LOCATION ZONE DERM: LOCATION ZONE: TRUNK

## 2022-05-12 NOTE — HPI: RASH
What Type Of Note Output Would You Prefer (Optional)?: Standard Output
Is This A New Presentation, Or A Follow-Up?: Referral for Rash
Who Is Your Referring Provider?: Chandni No PA-C

## 2022-05-12 NOTE — PROCEDURE: TREATMENT REGIMEN
Detail Level: Zone
Initiate Treatment: Ketoconizole BID x 4-6 wks as needed for flares. Instructions reviewed.\\nDiscontinue nystatin powder. \\nDiscussed chronic nature of condition and goal of maintaining few and less severe flares with above regimen.\\nF/u in event of flares increasing in frequency or severity.

## 2022-05-18 DIAGNOSIS — G44.209 TENSION HEADACHE: ICD-10-CM

## 2022-05-18 RX ORDER — BACLOFEN 20 MG/1
TABLET ORAL
Qty: 60 TABLET | Refills: 0 | Status: SHIPPED | OUTPATIENT
Start: 2022-05-18 | End: 2022-06-15

## 2022-05-18 NOTE — TELEPHONE ENCOUNTER
Was the patient seen in the last year in this department? Yes    Does patient have an active prescription for medications requested? Yes    Received Request Via: Pharmacy    Hospital Outpatient Visit on 07/19/2021   Component Date Value   • Forwarded to Lab: 07/19/2021 [facility]    • Forward Reason: 07/19/2021 Insurance    • Specimen Sent: 07/19/2021 Urine    Office Visit on 07/19/2021   Component Date Value   • POC Color 07/19/2021 yellow    • POC Appearance 07/19/2021 cloudy    • POC Leukocyte Esterase 07/19/2021 small    • POC Nitrites 07/19/2021 neg    • POC Urobiligen 07/19/2021 0.2    • POC Protein 07/19/2021 neg    • POC Urine PH 07/19/2021 5.5    • POC Blood 07/19/2021 trace    • POC Specific Gravity 07/19/2021 1.005    • POC Ketones 07/19/2021 neg    • POC Bilirubin 07/19/2021 neg    • POC Glucose 07/19/2021 500    Orders Only on 07/07/2021   Component Date Value   • Glucose 07/07/2021 77    • Bun 07/07/2021 22    • Creatinine 07/07/2021 0.86    • GFR If Non  Ameri* 07/07/2021 84    • GFR If  07/07/2021 96    • Bun-Creatinine Ratio 07/07/2021 26 (A)   • Sodium 07/07/2021 138    • Potassium 07/07/2021 4.5    • Chloride 07/07/2021 102    • Co2 07/07/2021 22    • Calcium 07/07/2021 9.5    • Total Protein 07/07/2021 6.8    • Albumin 07/07/2021 3.6 (A)   • Globulin 07/07/2021 3.2    • A-G Ratio 07/07/2021 1.1 (A)   • Total Bilirubin 07/07/2021 0.2    • Alkaline Phosphatase 07/07/2021 110    • AST(SGOT) 07/07/2021 18    • ALT(SGPT) 07/07/2021 25    • Specific Gravity 07/07/2021 >=1.030 (A)   • Ph 07/07/2021 6.0    • Color 07/07/2021 Yellow    • Character 07/07/2021 Turbid (A)   • Leukocyte Esterase 07/07/2021 2+ (A)   • Protein 07/07/2021 2+ (A)   • Glucose 07/07/2021 3+ (A)   • Ketones 07/07/2021 Negative    • Occult Blood 07/07/2021 1+ (A)   • Bilirubin 07/07/2021 Negative    • Urobilinogen Semi Qnt 07/07/2021 0.2    • Nitrite 07/07/2021 Negative    • Microscopic Exam 07/07/2021 See below:     • Microscopic Exam 07/07/2021 CANCELED    • Urinalysis Reflex 07/07/2021 Comment    • WBC 07/07/2021 >30 (A)   • RBC 07/07/2021 3-10 (A)   • Epithelial Cells Non Ovidio* 07/07/2021 >10 (A)   • Epithelial Cells Renal 07/07/2021 CANCELED    • Urine Casts 07/07/2021 None seen    • Cast Type 07/07/2021 CANCELED    • Urine Crystals 07/07/2021 CANCELED    • Crystal Type 07/07/2021 CANCELED    • Mucous Threads 07/07/2021 Present    • Bacteria 07/07/2021 Many (A)   • Yeast Cells 07/07/2021 Present (A)   • Trichomonas 07/07/2021 CANCELED    • Comment 07/07/2021 CANCELED    • Urine Culture 07/07/2021 Final report (A)   • Result 1 07/07/2021 Escherichia coli (A)   • Result 2 07/07/2021 Comment    • Susceptibility 07/07/2021 Comment    • Cholesterol,Tot 07/07/2021 174    • Triglycerides 07/07/2021 299 (A)   • HDL 07/07/2021 38 (A)   • VLDL Cholesterol Calc 07/07/2021 49 (A)   • LDL Chol Calc ([facility]) 07/07/2021 87    • Comment: 07/07/2021 CANCELED    • Creatinine, Random Urine 07/07/2021 71.6    • Microalbumin, Urine Rand* 07/07/2021 305.4    • Albumin / Creatinine Rat* 07/07/2021 427 (A)   • Glycohemoglobin 07/07/2021 12.0 (A)   • TSH 07/07/2021 2.210    • Free T-4 07/07/2021 2.41 (A)   • 25-Hydroxy   Vitamin D 25 07/07/2021 19.4 (A)   • Paul Peters CMP14 Defau* 07/07/2021 Comment    • Paul Peters LP Default 07/07/2021 Comment    Hospital Outpatient Visit on 07/07/2021   Component Date Value   • Forwarded to Lab: 07/08/2021 [facility]    • Forward Reason: 07/08/2021 Insurance    • Specimen Sent: 07/08/2021 Urine    • Specimen Sent (2nd): 07/08/2021 Blood    • Specimen Sent (3rd): 07/08/2021 Blood    • Specimen Sent (4th): 07/08/2021 Blood    ]

## 2022-05-27 ENCOUNTER — APPOINTMENT (RX ONLY)
Dept: URBAN - METROPOLITAN AREA CLINIC 31 | Facility: CLINIC | Age: 42
Setting detail: DERMATOLOGY
End: 2022-05-27

## 2022-05-27 DIAGNOSIS — L30.4 ERYTHEMA INTERTRIGO: ICD-10-CM

## 2022-05-27 PROCEDURE — ? COUNSELING

## 2022-05-27 PROCEDURE — ? TREATMENT REGIMEN

## 2022-05-27 PROCEDURE — 99212 OFFICE O/P EST SF 10 MIN: CPT

## 2022-05-27 ASSESSMENT — LOCATION SIMPLE DESCRIPTION DERM
LOCATION SIMPLE: LEFT BREAST
LOCATION SIMPLE: GROIN

## 2022-05-27 ASSESSMENT — LOCATION ZONE DERM: LOCATION ZONE: TRUNK

## 2022-05-27 ASSESSMENT — LOCATION DETAILED DESCRIPTION DERM
LOCATION DETAILED: LEFT SUPRAPUBIC SKIN
LOCATION DETAILED: RIGHT SUPRAPUBIC SKIN
LOCATION DETAILED: LEFT INFRAMAMMARY CREASE (INNER QUADRANT)

## 2022-05-27 NOTE — PROCEDURE: TREATMENT REGIMEN
Detail Level: Zone
Initiate Treatment: Continue Ketoconizole, apply thin layer for flares. Instructions reviewed.\\nPatient reminded to Discontinue nystatin powder. Start OTC Zeasorb powder, it was discussed that this is available at [facility] and could be ordered on [employer].\\nDiscussed chronic nature of condition and goal of maintaining few and less severe flares with above regimen.

## 2022-06-03 ENCOUNTER — TELEPHONE (OUTPATIENT)
Dept: CARDIOLOGY | Facility: MEDICAL CENTER | Age: 42
End: 2022-06-03
Payer: MEDICARE

## 2022-06-03 NOTE — TELEPHONE ENCOUNTER
Spoke to pt in regards to obtaining records for NP appointment with [facility]. Per patient has never been treated by a previous cardiologist. Pt stated she had a recent echo done at [facility], in chart. Confirmed most recent labs are in chart. Confirmed with patient appointment date, time, and location.

## 2022-06-06 NOTE — PROGRESS NOTES
"Subjective:   Chief Complaint:   Chief Complaint   Patient presents with   • Hypertension     F/V Dx: Primary hypertension   • Other     F/V Dx: Nonrheumatic tricuspid valve regurgitation       Tarah Britton is a 42 y.o. female who is referred by CINDY Cardenas for TR, pulmonary hypertension, FH CAD.    Echocardiogram at [facility] 2022 with EF of 65 to 70%, moderate TR, RVSP 45-50.  Normal RV size and function.  I cannot see the echocardiogram, just the report.  Echocardiogram at [facility] 2021 with mildly dilated RV, RVSP 35-40, no significant TR.    Has insulin-dependent type 2 diabetes, uncontrolled, A1c 9.6.    Has hyperlipidemia, on statin.  LDL 87, HDL low at 38, triglycerides 299.  For vitamin D deficiency.    Has hypertension, blood pressure controlled.    BMI 55.  Attempted bariatric surgery, was turned down twice.  Everyone in her family is large.    Has DESEAN, on CPAP but living in travel trailer and struggles to use it.  Likes to sleep propped up, not new.    Has moderate persistent asthma.    Has anemia with a normal MCV.    Father  of MI at 57, was number 14 she tells me, he had half a lung, was cut with sword.  She was only 13.  Mother  at 56, was in a nursing home, fell, could not care for self.  Pt states mother had \"14 heart attacks.\"  Brother in jail, not sure if he has a heart condition.    Today, she reports severe chest pain and was having it during the ECG which was normal.  Pain comes and goes, atypical.    Sedentary overall.  Limited by back pain, knee pain.  Can walk 20 feet without shortness of breath.    Gets lightheaded at times, stops, closes eyes, it passes, no syncope.     Some palpitations, only a few seconds.   No stroke/TIA like symptoms.    Living in [place] in small travel trailer with her SOBlane.  Has one 1/2 sister she keeps in contact with in [place], other 1/2 siblings [place] east.    DATA REVIEWED by me:  ECG (my personal interpretation) " 6/8/2022  Sinus, 86, small inferior Q waves of undetermined significance    Echo 3/25/2022 Jasvir Ibarra  The Ejection Fraction estimate is 65-70%   No regional wall motion abnormalities noted.   Normal diastolic function   The right ventricular systolic function is normal.   There is moderate tricuspid regurgitation   Right ventricular systolic pressure is elevated at 45-50 mmHg   There is no pericardial effusion.     Echocardiogram Jasvir Ibarra 8/25/2021  Left ventricular systolic function is normal.   The Ejection Fraction estimate is 60-65%   Normal diastolic function.   The right ventricle is mildly dilated.   The right ventricular systolic function is normal.   No significant valvular dysfunction.   Mildly elevated right ventricular systolic pressure, 35-40mmHg.         Most recent labs:       Lab Results   Component Value Date/Time    WBC 8.9 02/01/2011 05:30 AM    HEMOGLOBIN 9.6 (L) 02/01/2011 05:30 AM    HEMATOCRIT 28.2 (L) 02/01/2011 05:30 AM    MCV 89.6 02/01/2011 05:30 AM    INR 0.99 01/29/2011 12:00 AM    TSH 2.210 07/07/2021 08:55 AM      Lab Results   Component Value Date/Time    SODIUM 138 07/07/2021 08:55 AM    SODIUM 136 05/10/2011 08:40 AM    POTASSIUM 4.5 07/07/2021 08:55 AM    POTASSIUM 4.1 05/10/2011 08:40 AM    CHLORIDE 102 07/07/2021 08:55 AM    CHLORIDE 101 05/10/2011 08:40 AM    CO2 22 07/07/2021 08:55 AM    CO2 28 05/10/2011 08:40 AM    GLUCOSE 77 07/07/2021 08:55 AM    GLUCOSE 121 (H) 05/10/2011 08:40 AM    BUN 22 07/07/2021 08:55 AM    BUN 11 05/10/2011 08:40 AM    CREATININE 0.86 07/07/2021 08:55 AM    CREATININE 0.80 05/10/2011 08:40 AM    CREATININE 0.71 07/15/2010 12:00 AM    GLOMRATE >59 07/15/2010 12:00 AM      Lab Results   Component Value Date/Time    ASTSGOT 18 07/07/2021 08:55 AM    ASTSGOT 23 05/10/2011 08:40 AM    ALTSGPT 25 07/07/2021 08:55 AM    ALTSGPT 34 05/10/2011 08:40 AM    ALBUMIN 3.6 (L) 07/07/2021 08:55 AM    ALBUMIN 3.2 05/10/2011 08:40 AM      Lab Results  "  Component Value Date/Time    CHOLSTRLTOT 174 2021 08:55 AM    CHOLSTRLTOT 167 05/10/2011 08:40 AM    LDL 91 05/10/2011 08:40 AM    HDL 38 (L) 2021 08:55 AM    HDL 37 (L) 05/10/2011 08:40 AM    TRIGLYCERIDE 299 (H) 2021 08:55 AM    TRIGLYCERIDE 197 (H) 05/10/2011 08:40 AM     No results for input(s): NTPROBNP, TROPONINT in the last 72 hours.      Past Medical History:   Diagnosis Date   • Anxiety    • ASTHMA    • Depressed    • Depression    • Diabetes    • DM (diabetes mellitus) ([facility])    • HTN (hypertension)    • Hyperlipidemia LDL goal <70    • Morbid obesity with BMI of 50.0-59.9, adult ([facility])    • Psychiatric disorder     depressed   • Sleep apnea    • Uncontrolled type 2 diabetes mellitus with neurologic complication, without long-term current use of insulin ([facility])      Past Surgical History:   Procedure Laterality Date   • ABDOMINAL HYSTERECTOMY TOTAL     • APPENDECTOMY     • CHOLECYSTECTOMY     • CLUB FOOT RELEASE       Family History   Problem Relation Age of Onset   • Heart Disease Mother         \"14 heart attacks\"   • Other Mother          at 56 in nursing home after fall   • Stroke Father    • Heart Disease Sister    • Cancer Sister    • Stroke Sister      Social History     Socioeconomic History   • Marital status: Single     Spouse name: Not on file   • Number of children: Not on file   • Years of education: Not on file   • Highest education level: 12th grade   Occupational History   • Not on file   Tobacco Use   • Smoking status: Never Smoker   • Smokeless tobacco: Former User     Types: Chew     Quit date: 2018   Vaping Use   • Vaping Use: Never used   Substance and Sexual Activity   • Alcohol use: No   • Drug use: No   • Sexual activity: Yes     Partners: Male   Other Topics Concern   • Not on file   Social History Narrative    ** Merged History Encounter **          Social Determinants of Health     Financial Resource Strain: Not on file   Food Insecurity: Not on file "   Transportation Needs: Not on file   Physical Activity: Not on file   Stress: Not on file   Social Connections: Not on file   Intimate Partner Violence: Not on file   Housing Stability: Not on file     Allergies   Allergen Reactions   • Vitamin D Hives and Swelling     Hives all over body, legs and feet swollen       Current Outpatient Medications   Medication Sig Dispense Refill   • Insulin Disposable Pump (OMNIPOD DASH PODS, GEN 4,) Misc      • ketoconazole (NIZORAL) 2 % Cream Apply  topically every day.     • aspirin EC (ECOTRIN) 81 MG Tablet Delayed Response Take 1 Tablet by mouth every day. 30 Tablet    • baclofen (LIORESAL) 20 MG tablet TAKE ONE TABLET BY MOUTH TWICE A DAY 60 Tablet 0   • losartan (COZAAR) 25 MG Tab Take 2 Tablets by mouth every day. 180 Tablet 1   • levothyroxine (SYNTHROID) 100 MCG Tab Take 1 Tablet by mouth every morning on an empty stomach. ON EMPTY STOMACH 90 Tablet 0   • nystatin (MYCOSTATIN) powder Apply to beneath breasts and to skin folds twice a day 160 g 0   • escitalopram (LEXAPRO) 10 MG Tab Take 10 mg by mouth every day.     • albuterol (PROVENTIL) 2.5mg/3ml Nebu Soln solution for nebulization Take 3 mL by nebulization every four hours as needed for Shortness of Breath. 100 mL 2   • pioglitazone (ACTOS) 30 MG Tab TAKE ONE TABLET BY MOUTH DAILY 30 Tablet 0   • metFORMIN ER (GLUCOPHAGE XR) 500 MG TABLET SR 24 HR TAKE TWO TABLETS BY MOUTH TWICE A DAY (Patient taking differently: Take 1,000 mg by mouth 2 times a day. TAKE TWO TABLETS BY MOUTH TWICE A DAY) 60 Tablet 0   • Empagliflozin (JARDIANCE) 25 MG Tab Take 1 Tablet by mouth every day. TAKE ONE TABLET BY MOUTH DAILY 30 Tablet 0   • Continuous Blood Gluc Sensor (DEXCOM G6 SENSOR) Misc 1 Each every 10 days. 9 Each 0   • hydrocortisone rectal (ANUSOL-HC) 2.5% Cream Apply a small amount to affected area twice a day no longer than 2 weeks 28 g 0   • Insulin Degludec (TRESIBA FLEXTOUCH) 200 UNIT/ML Solution Pen-injector Inject 70 Units  "under the skin at bedtime. 6 mL 0   • insulin lispro (HUMALOG,ADMELOG) 100 UNIT/ML Solution Pen-injector injection PEN Inject 11 Units under the skin 3 times a day before meals. 5 Each 0   • atorvastatin (LIPITOR) 40 MG Tab Take 1 Tablet by mouth every day. TAKE ONE TABLET BY MOUTH DAILY 90 Tablet 2   • Alcohol Swabs (PHARMACIST CHOICE ALCOHOL) Pads      • ibuprofen (MOTRIN) 800 MG Tab TAKE ONE TABLET BY MOUTH EVERY 8 HOURS AS NEEDED 30 Tablet 0   • gabapentin (NEURONTIN) 300 MG Cap TAKE TWO CAPSULES BY MOUTH TWICE A DAY (Patient taking differently: Take 300 mg by mouth 2 times a day.) 360 Capsule 2   • Dulaglutide (TRULICITY) 1.5 MG/0.5ML Solution Pen-injector Inject 0.5 mL under the skin every 7 days. 2 mL 5   • EYLEA 2 MG/0.05ML Solution every day.     • Insulin Pen Needle 32G X 4 MM Misc 1 Each by Does not apply route 4 times a day. 120 Each 11     No current facility-administered medications for this visit.       ROS  All others systems reviewed and negative.     Objective:     /56 (BP Location: Left arm, Patient Position: Sitting, BP Cuff Size: Adult)   Pulse 88   Resp 20   Ht 1.626 m (5' 4\")   Wt (!) 146 kg (320 lb 12.3 oz)   SpO2 97%  Body mass index is 55.06 kg/m².    General: No acute distress. obese  HEENT: EOM grossly intact, no scleral icterus, no pharyngeal erythema.   Neck:  Unable to assess JVP due to habitus, no bruits, trachea midline  CVS: RRR. Distant HS. No callie M/R/G. trace LE edema.  2+ radial pulses, 2+ PT pulses  Resp: CTAB. No wheezing or crackles/rhonchi. Normal respiratory effort.  Abdomen: Soft, NT, no callie hepatomegaly.  MSK/Ext: No clubbing or cyanosis.  Skin: Warm and dry, no rashes.  Neurological: CN III-XII grossly intact. No focal deficits.   Psych: A&O x 3, appropriate affect,  adequate judgement        Assessment:     1. Tricuspid valve regurgitation, nonrheumatic  EKG    EC-ECHOCARDIOGRAM COMPLETE W/O CONT   2. Type 2 diabetes mellitus with diabetic neuropathy, with " long-term current use of insulin (HCC)  EKG   3. Mixed hyperlipidemia  EKG   4. Primary hypertension  EKG   5. DESEAN on CPAP  EKG    Referral to Pulmonary and Sleep Medicine   6. Morbid obesity with BMI of 50.0-59.9, adult (HCC)  EKG   7. Moderate persistent asthma with acute exacerbation  EKG   8. Palpitations  EKG   9. Elevated right ventricular end-diastolic pressure  EKG    EC-ECHOCARDIOGRAM COMPLETE W/O CONT   10. Vitamin D deficiency  EKG   11. Atypical chest pain     12. Other secondary pulmonary hypertension (HCC)  EC-ECHOCARDIOGRAM COMPLETE W/O CONT       Medical Decision Making:  Today's Assessment / Status / Plan:     -TR and pulmonary hypertension are most likely secondary.  Most likely causes is untreated DESEAN and elevated BMI.  -The best think she could do is wear her CPAP and then weight loss.  -It is a hardship to get back on her CPAP but she will try.  -She was turned down for bariatric surgery twice so this is not a reality for her  -Atypical CP, no evaluation  -FH CAD, cont primary prevention statin, ASA  -We can track the TR and pulm HTN over time  -RTC 1 year with echo at that time, echo ordered today    Written instructions given today:    -In 2021 [facility] cardiology said the right side of the heart was enlarging but the echo in 2022 said it was normal size.    -The pressures on the right side of the heart are elevated and that is because the heart is not happy pushing blood to the lungs, probably related to untreated sleep apnea and elevated body mass index.    -Getting your CPAP back on track could help out greatly.    -The leaking heart valve is also related to the same condition and is only moderately leaky so we will watch this over time.    -Return to see cardiology in 1 year with an echo prior.    -We do not need to start any medications for this right now.    -I will refer you to the sleep center, you can call any sleep center in [place] and get established to see if you can get  some updated equipment.  This would be very beneficial.    -You have a strong family history of heart disease but you are on great medications, I cannot do any better than what you are on right now.    Return in about 1 year (around 6/8/2023).    It is my pleasure to participate in the care of Ms. Britton.  Please do not hesitate to contact me with questions or concerns.    Shannan Graves MD, [facility]  Cardiologist [facility]    Please note that this dictation was created using voice recognition software. I have made every reasonable attempt to correct obvious errors, but it is possible there are errors of grammar and possibly content that I did not discover before finalizing the note.

## 2022-06-08 ENCOUNTER — OFFICE VISIT (OUTPATIENT)
Dept: CARDIOLOGY | Facility: PHYSICIAN GROUP | Age: 42
End: 2022-06-08
Attending: PHYSICIAN ASSISTANT
Payer: MEDICARE

## 2022-06-08 VITALS
HEART RATE: 88 BPM | SYSTOLIC BLOOD PRESSURE: 118 MMHG | OXYGEN SATURATION: 97 % | RESPIRATION RATE: 20 BRPM | BODY MASS INDEX: 50.02 KG/M2 | HEIGHT: 64 IN | WEIGHT: 293 LBS | DIASTOLIC BLOOD PRESSURE: 56 MMHG

## 2022-06-08 DIAGNOSIS — E66.01 MORBID OBESITY WITH BMI OF 50.0-59.9, ADULT (HCC): ICD-10-CM

## 2022-06-08 DIAGNOSIS — G47.33 OSA ON CPAP: ICD-10-CM

## 2022-06-08 DIAGNOSIS — R07.89 ATYPICAL CHEST PAIN: ICD-10-CM

## 2022-06-08 DIAGNOSIS — R00.2 PALPITATIONS: ICD-10-CM

## 2022-06-08 DIAGNOSIS — I10 PRIMARY HYPERTENSION: ICD-10-CM

## 2022-06-08 DIAGNOSIS — I27.29 OTHER SECONDARY PULMONARY HYPERTENSION (HCC): ICD-10-CM

## 2022-06-08 DIAGNOSIS — R94.30 ELEVATED RIGHT VENTRICULAR END-DIASTOLIC PRESSURE: ICD-10-CM

## 2022-06-08 DIAGNOSIS — Z79.4 TYPE 2 DIABETES MELLITUS WITH DIABETIC NEUROPATHY, WITH LONG-TERM CURRENT USE OF INSULIN (HCC): ICD-10-CM

## 2022-06-08 DIAGNOSIS — E78.2 MIXED HYPERLIPIDEMIA: ICD-10-CM

## 2022-06-08 DIAGNOSIS — E11.40 TYPE 2 DIABETES MELLITUS WITH DIABETIC NEUROPATHY, WITH LONG-TERM CURRENT USE OF INSULIN (HCC): ICD-10-CM

## 2022-06-08 DIAGNOSIS — I36.1 TRICUSPID VALVE REGURGITATION, NONRHEUMATIC: ICD-10-CM

## 2022-06-08 DIAGNOSIS — J45.41 MODERATE PERSISTENT ASTHMA WITH ACUTE EXACERBATION: ICD-10-CM

## 2022-06-08 DIAGNOSIS — E55.9 VITAMIN D DEFICIENCY: ICD-10-CM

## 2022-06-08 LAB — EKG IMPRESSION: NORMAL

## 2022-06-08 PROCEDURE — 99204 OFFICE O/P NEW MOD 45 MIN: CPT | Mod: 25 | Performed by: INTERNAL MEDICINE

## 2022-06-08 PROCEDURE — 93000 ELECTROCARDIOGRAM COMPLETE: CPT | Performed by: INTERNAL MEDICINE

## 2022-06-08 RX ORDER — EMPAGLIFLOZIN 25 MG/1
25 TABLET, FILM COATED ORAL DAILY
COMMUNITY
Start: 2022-05-02 | End: 2022-06-08

## 2022-06-08 RX ORDER — KETOCONAZOLE 20 MG/G
CREAM TOPICAL DAILY
COMMUNITY
Start: 2022-05-13 | End: 2022-11-17

## 2022-06-08 RX ORDER — INSULIN PUMP CONTROLLER
EACH MISCELLANEOUS
COMMUNITY
Start: 2022-05-05 | End: 2022-11-17

## 2022-06-08 NOTE — LETTER
"     [facility]   [address]  [place] [address]  Phone: [phone]  Fax: [phone]              Tarah Britton  1980    Encounter Date: 2022    Shannan Graves M.D.          PROGRESS NOTE:  Subjective:   Chief Complaint:   Chief Complaint   Patient presents with   • Hypertension     F/V Dx: Primary hypertension   • Other     F/V Dx: Nonrheumatic tricuspid valve regurgitation       Tarah Britton is a 42 y.o. female who is referred by CINDY Cardenas for TR, pulmonary hypertension, FH CAD.    Echocardiogram at [facility] 2022 with EF of 65 to 70%, moderate TR, RVSP 45-50.  Normal RV size and function.  I cannot see the echocardiogram, just the report.  Echocardiogram at [facility] 2021 with mildly dilated RV, RVSP 35-40, no significant TR.    Has insulin-dependent type 2 diabetes, uncontrolled, A1c 9.6.    Has hyperlipidemia, on statin.  LDL 87, HDL low at 38, triglycerides 299.  For vitamin D deficiency.    Has hypertension, blood pressure controlled.    BMI 55.  Attempted bariatric surgery, was turned down twice.  Everyone in her family is large.    Has DESEAN, on CPAP but living in travel trailer and struggles to use it.  Likes to sleep propped up, not new.    Has moderate persistent asthma.    Has anemia with a normal MCV.    Father  of MI at 57, was number 14 she tells me, he had half a lung, was cut with sword.  She was only 13.  Mother  at 56, was in a nursing home, fell, could not care for self.  Pt states mother had \"14 heart attacks.\"  Brother in assisted, not sure if he has a heart condition.    Today, she reports severe chest pain and was having it during the ECG which was normal.  Pain comes and goes, atypical.    Sedentary overall.  Limited by back pain, knee pain.  Can walk 20 feet without shortness of breath.    Gets lightheaded at times, stops, closes eyes, it passes, no syncope.     Some " palpitations, only a few seconds.   No stroke/TIA like symptoms.    Living in [place] in small travel trailer with her SO, Blane.  Has one 1/2 sister she keeps in contact with in [place], other 1/2 siblings back east.    DATA REVIEWED by me:  ECG (my personal interpretation) 6/8/2022  Sinus, 86, small inferior Q waves of undetermined significance    Echo 3/25/2022 Jasvir Ibarra  The Ejection Fraction estimate is 65-70%   No regional wall motion abnormalities noted.   Normal diastolic function   The right ventricular systolic function is normal.   There is moderate tricuspid regurgitation   Right ventricular systolic pressure is elevated at 45-50 mmHg   There is no pericardial effusion.     Echocardiogram Jasvir Ibarra 8/25/2021  Left ventricular systolic function is normal.   The Ejection Fraction estimate is 60-65%   Normal diastolic function.   The right ventricle is mildly dilated.   The right ventricular systolic function is normal.   No significant valvular dysfunction.   Mildly elevated right ventricular systolic pressure, 35-40mmHg.         Most recent labs:       Lab Results   Component Value Date/Time    WBC 8.9 02/01/2011 05:30 AM    HEMOGLOBIN 9.6 (L) 02/01/2011 05:30 AM    HEMATOCRIT 28.2 (L) 02/01/2011 05:30 AM    MCV 89.6 02/01/2011 05:30 AM    INR 0.99 01/29/2011 12:00 AM    TSH 2.210 07/07/2021 08:55 AM      Lab Results   Component Value Date/Time    SODIUM 138 07/07/2021 08:55 AM    SODIUM 136 05/10/2011 08:40 AM    POTASSIUM 4.5 07/07/2021 08:55 AM    POTASSIUM 4.1 05/10/2011 08:40 AM    CHLORIDE 102 07/07/2021 08:55 AM    CHLORIDE 101 05/10/2011 08:40 AM    CO2 22 07/07/2021 08:55 AM    CO2 28 05/10/2011 08:40 AM    GLUCOSE 77 07/07/2021 08:55 AM    GLUCOSE 121 (H) 05/10/2011 08:40 AM    BUN 22 07/07/2021 08:55 AM    BUN 11 05/10/2011 08:40 AM    CREATININE 0.86 07/07/2021 08:55 AM    CREATININE 0.80 05/10/2011 08:40 AM    CREATININE 0.71 07/15/2010 12:00 AM    GLOMRATE >59 07/15/2010 12:00 AM     "  Lab Results   Component Value Date/Time    ASTSGOT 18 2021 08:55 AM    ASTSGOT 23 05/10/2011 08:40 AM    ALTSGPT 25 2021 08:55 AM    ALTSGPT 34 05/10/2011 08:40 AM    ALBUMIN 3.6 (L) 2021 08:55 AM    ALBUMIN 3.2 05/10/2011 08:40 AM      Lab Results   Component Value Date/Time    CHOLSTRLTOT 174 2021 08:55 AM    CHOLSTRLTOT 167 05/10/2011 08:40 AM    LDL 91 05/10/2011 08:40 AM    HDL 38 (L) 2021 08:55 AM    HDL 37 (L) 05/10/2011 08:40 AM    TRIGLYCERIDE 299 (H) 2021 08:55 AM    TRIGLYCERIDE 197 (H) 05/10/2011 08:40 AM     No results for input(s): NTPROBNP, TROPONINT in the last 72 hours.      Past Medical History:   Diagnosis Date   • Anxiety    • ASTHMA    • Depressed    • Depression    • Diabetes    • DM (diabetes mellitus) ([facility])    • HTN (hypertension)    • Hyperlipidemia LDL goal <70    • Morbid obesity with BMI of 50.0-59.9, adult ([facility])    • Psychiatric disorder     depressed   • Sleep apnea    • Uncontrolled type 2 diabetes mellitus with neurologic complication, without long-term current use of insulin ([facility])      Past Surgical History:   Procedure Laterality Date   • ABDOMINAL HYSTERECTOMY TOTAL     • APPENDECTOMY     • CHOLECYSTECTOMY     • CLUB FOOT RELEASE       Family History   Problem Relation Age of Onset   • Heart Disease Mother         \"14 heart attacks\"   • Other Mother          at 56 in nursing home after fall   • Stroke Father    • Heart Disease Sister    • Cancer Sister    • Stroke Sister      Social History     Socioeconomic History   • Marital status: Single     Spouse name: Not on file   • Number of children: Not on file   • Years of education: Not on file   • Highest education level: 12th grade   Occupational History   • Not on file   Tobacco Use   • Smoking status: Never Smoker   • Smokeless tobacco: Former User     Types: Chew     Quit date: 2018   Vaping Use   • Vaping Use: Never used   Substance and Sexual Activity   • Alcohol use: No   • Drug " use: No   • Sexual activity: Yes     Partners: Male   Other Topics Concern   • Not on file   Social History Narrative    ** Merged History Encounter **          Social Determinants of Health     Financial Resource Strain: Not on file   Food Insecurity: Not on file   Transportation Needs: Not on file   Physical Activity: Not on file   Stress: Not on file   Social Connections: Not on file   Intimate Partner Violence: Not on file   Housing Stability: Not on file     Allergies   Allergen Reactions   • Vitamin D Hives and Swelling     Hives all over body, legs and feet swollen       Current Outpatient Medications   Medication Sig Dispense Refill   • Insulin Disposable Pump (OMNIPOD DASH PODS, GEN 4,) Misc      • ketoconazole (NIZORAL) 2 % Cream Apply  topically every day.     • aspirin EC (ECOTRIN) 81 MG Tablet Delayed Response Take 1 Tablet by mouth every day. 30 Tablet    • baclofen (LIORESAL) 20 MG tablet TAKE ONE TABLET BY MOUTH TWICE A DAY 60 Tablet 0   • losartan (COZAAR) 25 MG Tab Take 2 Tablets by mouth every day. 180 Tablet 1   • levothyroxine (SYNTHROID) 100 MCG Tab Take 1 Tablet by mouth every morning on an empty stomach. ON EMPTY STOMACH 90 Tablet 0   • nystatin (MYCOSTATIN) powder Apply to beneath breasts and to skin folds twice a day 160 g 0   • escitalopram (LEXAPRO) 10 MG Tab Take 10 mg by mouth every day.     • albuterol (PROVENTIL) 2.5mg/3ml Nebu Soln solution for nebulization Take 3 mL by nebulization every four hours as needed for Shortness of Breath. 100 mL 2   • pioglitazone (ACTOS) 30 MG Tab TAKE ONE TABLET BY MOUTH DAILY 30 Tablet 0   • metFORMIN ER (GLUCOPHAGE XR) 500 MG TABLET SR 24 HR TAKE TWO TABLETS BY MOUTH TWICE A DAY (Patient taking differently: Take 1,000 mg by mouth 2 times a day. TAKE TWO TABLETS BY MOUTH TWICE A DAY) 60 Tablet 0   • Empagliflozin (JARDIANCE) 25 MG Tab Take 1 Tablet by mouth every day. TAKE ONE TABLET BY MOUTH DAILY 30 Tablet 0   • Continuous Blood Gluc Sensor (DEXCOM  "G6 SENSOR) Misc 1 Each every 10 days. 9 Each 0   • hydrocortisone rectal (ANUSOL-HC) 2.5% Cream Apply a small amount to affected area twice a day no longer than 2 weeks 28 g 0   • Insulin Degludec (TRESIBA FLEXTOUCH) 200 UNIT/ML Solution Pen-injector Inject 70 Units under the skin at bedtime. 6 mL 0   • insulin lispro (HUMALOG,ADMELOG) 100 UNIT/ML Solution Pen-injector injection PEN Inject 11 Units under the skin 3 times a day before meals. 5 Each 0   • atorvastatin (LIPITOR) 40 MG Tab Take 1 Tablet by mouth every day. TAKE ONE TABLET BY MOUTH DAILY 90 Tablet 2   • Alcohol Swabs (PHARMACIST CHOICE ALCOHOL) Pads      • ibuprofen (MOTRIN) 800 MG Tab TAKE ONE TABLET BY MOUTH EVERY 8 HOURS AS NEEDED 30 Tablet 0   • gabapentin (NEURONTIN) 300 MG Cap TAKE TWO CAPSULES BY MOUTH TWICE A DAY (Patient taking differently: Take 300 mg by mouth 2 times a day.) 360 Capsule 2   • Dulaglutide (TRULICITY) 1.5 MG/0.5ML Solution Pen-injector Inject 0.5 mL under the skin every 7 days. 2 mL 5   • EYLEA 2 MG/0.05ML Solution every day.     • Insulin Pen Needle 32G X 4 MM Misc 1 Each by Does not apply route 4 times a day. 120 Each 11     No current facility-administered medications for this visit.       ROS  All others systems reviewed and negative.     Objective:     /56 (BP Location: Left arm, Patient Position: Sitting, BP Cuff Size: Adult)   Pulse 88   Resp 20   Ht 1.626 m (5' 4\")   Wt (!) 146 kg (320 lb 12.3 oz)   SpO2 97%  Body mass index is 55.06 kg/m².    General: No acute distress. obese  HEENT: EOM grossly intact, no scleral icterus, no pharyngeal erythema.   Neck:  Unable to assess JVP due to habitus, no bruits, trachea midline  CVS: RRR. Distant HS. No callie M/R/G. trace LE edema.  2+ radial pulses, 2+ PT pulses  Resp: CTAB. No wheezing or crackles/rhonchi. Normal respiratory effort.  Abdomen: Soft, NT, no callie hepatomegaly.  MSK/Ext: No clubbing or cyanosis.  Skin: Warm and dry, no rashes.  Neurological: CN III-XII " grossly intact. No focal deficits.   Psych: A&O x 3, appropriate affect,  adequate judgement        Assessment:     1. Tricuspid valve regurgitation, nonrheumatic  EKG    EC-ECHOCARDIOGRAM COMPLETE W/O CONT   2. Type 2 diabetes mellitus with diabetic neuropathy, with long-term current use of insulin ([facility])  EKG   3. Mixed hyperlipidemia  EKG   4. Primary hypertension  EKG   5. DESEAN on CPAP  EKG    Referral to Pulmonary and Sleep Medicine   6. Morbid obesity with BMI of 50.0-59.9, adult ([facility])  EKG   7. Moderate persistent asthma with acute exacerbation  EKG   8. Palpitations  EKG   9. Elevated right ventricular end-diastolic pressure  EKG    EC-ECHOCARDIOGRAM COMPLETE W/O CONT   10. Vitamin D deficiency  EKG   11. Atypical chest pain     12. Other secondary pulmonary hypertension (HCC)  EC-ECHOCARDIOGRAM COMPLETE W/O CONT       Medical Decision Making:  Today's Assessment / Status / Plan:     -TR and pulmonary hypertension are most likely secondary.  Most likely causes is untreated DESEAN and elevated BMI.  -The best think she could do is wear her CPAP and then weight loss.  -It is a hardship to get back on her CPAP but she will try.  -She was turned down for bariatric surgery twice so this is not a reality for her  -Atypical CP, no evaluation  -FH CAD, cont primary prevention statin, ASA  -We can track the TR and pulm HTN over time  -RTC 1 year with echo at that time, echo ordered today    Written instructions given today:    -In 2021 Jasvir Ibarra cardiology said the right side of the heart was enlarging but the echo in 2022 said it was normal size.    -The pressures on the right side of the heart are elevated and that is because the heart is not happy pushing blood to the lungs, probably related to untreated sleep apnea and elevated body mass index.    -Getting your CPAP back on track could help out greatly.    -The leaking heart valve is also related to the same condition and is only moderately leaky so we will watch  this over time.    -Return to see cardiology in 1 year with an echo prior.    -We do not need to start any medications for this right now.    -I will refer you to the sleep center, you can call any sleep center in [place] and get established to see if you can get some updated equipment.  This would be very beneficial.    -You have a strong family history of heart disease but you are on great medications, I cannot do any better than what you are on right now.    Return in about 1 year (around 6/8/2023).    It is my pleasure to participate in the care of Ms. Britton.  Please do not hesitate to contact me with questions or concerns.    Shannan Graves MD, [facility]  Cardiologist [facility]    Please note that this dictation was created using voice recognition software. I have made every reasonable attempt to correct obvious errors, but it is possible there are errors of grammar and possibly content that I did not discover before finalizing the note.          No Recipients

## 2022-06-08 NOTE — PATIENT INSTRUCTIONS
-In 2021 [facility] cardiology said the right side of the heart was enlarging but the echo in 2022 said it was normal size.    -The pressures on the right side of the heart are elevated and that is because the heart is not happy pushing blood to the lungs, probably related to untreated sleep apnea and elevated body mass index.    -Getting your CPAP back on track could help out greatly.    -The leaking heart valve is also related to the same condition and is only moderately leaky so we will watch this over time.    -Return to see cardiology in 1 year with an echo prior.    -We do not need to start any medications for this right now.    -I will refer you to the sleep center, you can call any sleep center in [place] and get established to see if you can get some updated equipment.  This would be very beneficial.    -You have a strong family history of heart disease but you are on great medications, I cannot do any better than what you are on right now.

## 2022-06-15 DIAGNOSIS — G44.209 TENSION HEADACHE: ICD-10-CM

## 2022-06-15 RX ORDER — BACLOFEN 20 MG/1
TABLET ORAL
Qty: 60 TABLET | Refills: 0 | Status: SHIPPED | OUTPATIENT
Start: 2022-06-15

## 2022-06-15 NOTE — TELEPHONE ENCOUNTER
Received request via: Pharmacy    Was the patient seen in the last year in this department? Yes    Does the patient have an active prescription (recently filled or refills available) for medication(s) requested? No     Last OV 4/26/2022  Last labs 9/22/2021

## 2022-06-21 ENCOUNTER — PATIENT MESSAGE (OUTPATIENT)
Dept: MEDICAL GROUP | Facility: CLINIC | Age: 42
End: 2022-06-21

## 2022-06-21 DIAGNOSIS — F32.A DEPRESSION, UNSPECIFIED DEPRESSION TYPE: ICD-10-CM

## 2022-06-21 DIAGNOSIS — F41.1 GENERALIZED ANXIETY DISORDER: ICD-10-CM

## 2022-06-21 DIAGNOSIS — J45.41 MODERATE PERSISTENT ASTHMA WITH ACUTE EXACERBATION: ICD-10-CM

## 2022-07-17 DIAGNOSIS — Z79.4 TYPE 2 DIABETES MELLITUS WITH DIABETIC NEUROPATHY, WITH LONG-TERM CURRENT USE OF INSULIN (HCC): ICD-10-CM

## 2022-07-17 DIAGNOSIS — E11.40 TYPE 2 DIABETES MELLITUS WITH DIABETIC NEUROPATHY, WITH LONG-TERM CURRENT USE OF INSULIN (HCC): ICD-10-CM

## 2022-07-18 NOTE — TELEPHONE ENCOUNTER
Received request via: Pharmacy    Was the patient seen in the last year in this department? Yes    Does the patient have an active prescription (recently filled or refills available) for medication(s) requested? No     Last OV: 6/8/22  Last labs: 8/17/21

## 2022-07-19 RX ORDER — GABAPENTIN 300 MG/1
CAPSULE ORAL
Qty: 360 CAPSULE | Refills: 0 | Status: SHIPPED | OUTPATIENT
Start: 2022-07-19 | End: 2022-10-18

## 2022-08-17 DIAGNOSIS — J45.41 MODERATE PERSISTENT ASTHMA WITH ACUTE EXACERBATION: ICD-10-CM

## 2022-08-17 RX ORDER — ESCITALOPRAM OXALATE 10 MG/1
10 TABLET ORAL DAILY
Qty: 30 TABLET | Refills: 3 | Status: SHIPPED | OUTPATIENT
Start: 2022-08-17 | End: 2022-10-25 | Stop reason: SDUPTHER

## 2022-08-17 RX ORDER — ALBUTEROL SULFATE 2.5 MG/3ML
2.5 SOLUTION RESPIRATORY (INHALATION) EVERY 4 HOURS PRN
Qty: 100 ML | Refills: 2 | Status: SHIPPED | OUTPATIENT
Start: 2022-08-17 | End: 2022-10-13 | Stop reason: SDUPTHER

## 2022-08-17 NOTE — TELEPHONE ENCOUNTER
Received request via: Patient    Was the patient seen in the last year in this department? Yes    Does the patient have an active prescription (recently filled or refills available) for medication(s) requested? No    Last OV: 4/26/22  Last labs: 8/17/21

## 2022-08-18 ENCOUNTER — TELEPHONE (OUTPATIENT)
Dept: MEDICAL GROUP | Facility: CLINIC | Age: 42
End: 2022-08-18
Payer: MEDICARE

## 2022-08-18 RX ORDER — ALBUTEROL SULFATE 90 UG/1
2 AEROSOL, METERED RESPIRATORY (INHALATION) EVERY 4 HOURS PRN
Qty: 1 EACH | Refills: 3 | Status: SHIPPED
Start: 2022-08-18 | End: 2022-11-17

## 2022-08-18 NOTE — TELEPHONE ENCOUNTER
DOCUMENTATION OF PAR STATUS:    1. Name of Medication & Dose: Albuterol Sulfate (2.5 MG/3ML) 0.083%     2. Name of Prescription Coverage Company & phone #: Medicare & Medicaid    3. Date Prior Auth Submitted: 8/18/22    4. What information was given to obtain insurance decision? Chart Notes, ICD-10 Codes, diagnosis.     5. Prior Auth Status? Denied    6. Patient Notified: N\A

## 2022-09-27 ENCOUNTER — PATIENT MESSAGE (OUTPATIENT)
Dept: MEDICAL GROUP | Facility: CLINIC | Age: 42
End: 2022-09-27
Payer: MEDICARE

## 2022-09-27 DIAGNOSIS — J45.41 MODERATE PERSISTENT ASTHMA WITH ACUTE EXACERBATION: ICD-10-CM

## 2022-10-11 DIAGNOSIS — I10 ESSENTIAL HYPERTENSION: ICD-10-CM

## 2022-10-11 RX ORDER — LOSARTAN POTASSIUM 25 MG/1
50 TABLET ORAL DAILY
Qty: 180 TABLET | Refills: 0 | Status: SHIPPED | OUTPATIENT
Start: 2022-10-11 | End: 2023-03-15

## 2022-10-11 NOTE — TELEPHONE ENCOUNTER
Was the patient seen in the last year in this department? Yes    Does patient have an active prescription for medications requested? Yes    Received Request Via: Pharmacy    Office Visit on 2022   Component Date Value    Report 2022                      [facility]    Test Date:  2022  Pt Name:    EFREM SPAULDING                   Department: [facility]  MRN:        [id number]                      Room:  Gender:     Female                       Technician:   :        1980                   Requested By:SHANNAN GRAVES  Order #:    [id number]                    Reading MD: Shannan Graves    Measurements  Intervals                                Axis  Rate:       86                           P:          26  NV:         128                          QRS:        44  QRSD:       92                           T:          43  QT:         380  QTc:        455    Interpretive Statements  SINUS RHYTHM  Small inferior Q waves of undetermined significance  No previous ECG available for comparison  Electronically Signed On 2022 15:21:49 PDT by Shannan Graves     ]

## 2022-10-13 RX ORDER — ALBUTEROL SULFATE 2.5 MG/3ML
2.5 SOLUTION RESPIRATORY (INHALATION) EVERY 4 HOURS PRN
Qty: 100 ML | Refills: 2 | Status: SHIPPED | OUTPATIENT
Start: 2022-10-13 | End: 2022-11-17 | Stop reason: SDUPTHER

## 2022-11-17 ENCOUNTER — TELEMEDICINE (OUTPATIENT)
Dept: MEDICAL GROUP | Facility: CLINIC | Age: 42
End: 2022-11-17
Payer: MEDICARE

## 2022-11-17 DIAGNOSIS — J45.41 MODERATE PERSISTENT ASTHMA WITH ACUTE EXACERBATION: ICD-10-CM

## 2022-11-17 DIAGNOSIS — E11.3311 MODERATE NONPROLIFERATIVE DIABETIC RETINOPATHY OF RIGHT EYE WITH MACULAR EDEMA ASSOCIATED WITH TYPE 2 DIABETES MELLITUS (HCC): ICD-10-CM

## 2022-11-17 DIAGNOSIS — F32.A DEPRESSION, UNSPECIFIED DEPRESSION TYPE: ICD-10-CM

## 2022-11-17 DIAGNOSIS — E11.42 DIABETIC POLYNEUROPATHY ASSOCIATED WITH TYPE 2 DIABETES MELLITUS (HCC): ICD-10-CM

## 2022-11-17 PROCEDURE — 99214 OFFICE O/P EST MOD 30 MIN: CPT | Mod: 95 | Performed by: PHYSICIAN ASSISTANT

## 2022-11-17 RX ORDER — INSULIN ASPART 100 [IU]/ML
INJECTION, SOLUTION INTRAVENOUS; SUBCUTANEOUS
COMMUNITY
Start: 2022-10-24

## 2022-11-17 RX ORDER — GABAPENTIN 600 MG/1
600 TABLET ORAL 2 TIMES DAILY
Qty: 180 TABLET | Refills: 1 | Status: SHIPPED | OUTPATIENT
Start: 2022-11-17 | End: 2024-01-02

## 2022-11-17 RX ORDER — ESCITALOPRAM OXALATE 5 MG/1
5 TABLET ORAL DAILY
Qty: 30 TABLET | Refills: 0 | Status: SHIPPED | OUTPATIENT
Start: 2022-11-17 | End: 2022-12-14

## 2022-11-17 RX ORDER — INSULIN ASPART 100 [IU]/ML
INJECTION, SOLUTION INTRAVENOUS; SUBCUTANEOUS
COMMUNITY
Start: 2022-10-26

## 2022-11-17 RX ORDER — ALBUTEROL SULFATE 2.5 MG/3ML
2.5 SOLUTION RESPIRATORY (INHALATION) EVERY 4 HOURS PRN
Qty: 300 ML | Refills: 2 | Status: SHIPPED | OUTPATIENT
Start: 2022-11-17

## 2022-11-17 NOTE — LETTER
[employer]  Rosa Gross P.A.-C.  [address]  [facility] [address]  Fax: [phone]   Authorization for Release/Disclosure of   Protected Health Information   Name: TARAH SPAULDING : 1980 SSN: [id number]   Address: [address] [place] [address] [place] [address] Phone:    [phone] (home)    I authorize the entity listed below to release/disclose the PHI below to:   [facility]/Rosa Gross P.A.-C. and Rosa Gross P.A.-C.   Provider or Entity Name:   [facility].       Address   City, State, [facility]   Phone:      Fax:     Reason for request: continuity of care   Information to be released:    [  ] LAST COLONOSCOPY,  including any PATH REPORT and follow-up  [  ] LAST FIT/COLOGUARD RESULT [  ] LAST DEXA  [  ] LAST MAMMOGRAM  [  ] LAST PAP  [  ] LAST LABS [  ] RETINA EXAM REPORT  [  ] IMMUNIZATION RECORDS  [ x ] Release all info      [  ] Check here and initial the line next to each item to release ALL health information INCLUDING  _____ Care and treatment for drug and / or alcohol abuse  _____ HIV testing, infection status, or AIDS  _____ Genetic Testing    DATES OF SERVICE OR TIME PERIOD TO BE DISCLOSED: _____________  I understand and acknowledge that:  * This Authorization may be revoked at any time by you in writing, except if your health information has already been used or disclosed.  * Your health information that will be used or disclosed as a result of you signing this authorization could be re-disclosed by the recipient. If this occurs, your re-disclosed health information may no longer be protected by State or Federal laws.  * You may refuse to sign this Authorization. Your refusal will not affect your ability to obtain treatment.  * This Authorization becomes effective upon signing and will  on (date) __________.      If no date is indicated, this Authorization will  one (1) year from the signature date.    Name: Tarah Spaulding    Signature:   Date:      11/17/2022       PLEASE FAX REQUESTED RECORDS BACK TO: [phone]

## 2022-11-17 NOTE — PROGRESS NOTES
Virtual Visit: Established Patient   This visit was conducted via Zoom using secure and encrypted videoconferencing technology.   The patient was in their home in the state of [place].    The patient's identity was confirmed and verbal consent was obtained for this virtual visit.    Subjective:   CC:   Chief Complaint   Patient presents with    Medication Refill     Gabapentin   Metformin   Pt needs albuterol for nebulizer     Tarah Britton is a 42 y.o. female presenting for evaluation and management of:    Gabapentin.  Patient sates that she is doing well with the medication and is not having any problems with the medication.  Patient is currently taking gabapentin 600 mg twice a day for diabetic peripheral neuropathy.  She is needing a refill at this time.  She is also needing a refill of her albuterol nebulizer medication for her asthma.  She will follow-up with her endocrinologist for medication refills for her diabetic medication.  However she has been to see [place] [facility] and we will try to obtain these records.    Patient requests to come off of her depression medication.  She feels that it is no longer needed.    ROS   Denies any other symptoms unless previously indicated    Current medicines (including changes today)  Current Outpatient Medications   Medication Sig Dispense Refill    NOVOLOG 100 UNIT/ML Solution       insulin aspart (NOVOLOG) 100 UNIT/ML Solution Inject 180 units under the skin once daily via insulin pump (max of 180 units per day) 90 days supply      gabapentin (NEURONTIN) 600 MG tablet Take 1 Tablet by mouth 2 times a day. 180 Tablet 1    albuterol (PROVENTIL) 2.5mg/3ml Nebu Soln solution for nebulization Take 3 mL by nebulization every four hours as needed for Shortness of Breath. 300 mL 2    escitalopram (LEXAPRO) 5 MG tablet Take 1 Tablet by mouth every day. 30 Tablet 0    atorvastatin (LIPITOR) 40 MG Tab Take 1 Tablet by mouth every day. TAKE ONE TABLET BY MOUTH DAILY 30 Tablet 0     levothyroxine (SYNTHROID) 100 MCG Tab Take 1 Tablet by mouth every morning on an empty stomach. ON EMPTY STOMACH 30 Tablet 0    losartan (COZAAR) 25 MG Tab Take 2 Tablets by mouth every day. 180 Tablet 0    baclofen (LIORESAL) 20 MG tablet TAKE ONE TABLET BY MOUTH TWICE A DAY 60 Tablet 0    Insulin Disposable Pump (OMNIPOD DASH PODS, GEN 4,) Misc       aspirin EC (ECOTRIN) 81 MG Tablet Delayed Response Take 1 Tablet by mouth every day. 30 Tablet     pioglitazone (ACTOS) 30 MG Tab TAKE ONE TABLET BY MOUTH DAILY 30 Tablet 0    metFORMIN ER (GLUCOPHAGE XR) 500 MG TABLET SR 24 HR TAKE TWO TABLETS BY MOUTH TWICE A DAY (Patient taking differently: Take 2 Tablets by mouth 2 times a day. TAKE TWO TABLETS BY MOUTH TWICE A DAY) 60 Tablet 0    Empagliflozin (JARDIANCE) 25 MG Tab Take 1 Tablet by mouth every day. TAKE ONE TABLET BY MOUTH DAILY 30 Tablet 0    Continuous Blood Gluc Sensor (DEXCOM G6 SENSOR) Misc 1 Each every 10 days. 9 Each 0    insulin lispro (HUMALOG,ADMELOG) 100 UNIT/ML Solution Pen-injector injection PEN Inject 11 Units under the skin 3 times a day before meals. 5 Each 0    Alcohol Swabs (PHARMACIST CHOICE ALCOHOL) Pads       Dulaglutide (TRULICITY) 1.5 MG/0.5ML Solution Pen-injector Inject 0.5 mL under the skin every 7 days. 2 mL 5    Insulin Pen Needle 32G X 4 MM Misc 1 Each by Does not apply route 4 times a day. 120 Each 11    ketoconazole (NIZORAL) 2 % Cream Apply  topically every day.       No current facility-administered medications for this visit.       Patient Active Problem List    Diagnosis Date Noted    Diabetic polyneuropathy associated with type 2 diabetes mellitus (HCC) 11/17/2022    Acute pain of right shoulder 04/26/2022    Acute pain of both knees 04/26/2022    Cyst of knee joint, left 04/26/2022    Abrasion of right arm 04/26/2022    Nonrheumatic tricuspid valve regurgitation 04/06/2022    Elevated right ventricular end-diastolic pressure 04/06/2022    Bronchitis 02/15/2022    Dilated  ventricle 09/09/2021    Vitamin D deficiency 07/19/2021    Urge incontinence 07/07/2021    Breast pain, left 07/07/2021    Blood in stool 07/07/2021    Encounter for vitamin deficiency screening 07/07/2021    Tension headache 07/07/2021    Walking difficulty due to ankle and foot 07/07/2021    Rash 04/07/2021    Breast lump 12/21/2020    Intractable migraine with aura without status migrainosus 11/03/2020    Plantar wart 11/03/2020    Other chest pain 11/03/2020    Palpitations 11/03/2020    Visit for screening mammogram 11/03/2020    Generalized anxiety disorder 06/11/2019    Learning disabilities 06/11/2019    Moderate nonproliferative diabetic retinopathy associated with type 2 diabetes mellitus (HCC) 08/31/2018    Acquired hypothyroidism 05/31/2018    Moderate persistent asthma with acute exacerbation 05/17/2018    Frequent headaches 05/17/2018    Morbid obesity with BMI of 50.0-59.9, adult ([facility]) 01/29/2011    Type 2 diabetes mellitus, with long-term current use of insulin ([facility])     Mixed hyperlipidemia     HTN (hypertension)     Depression     DESEAN on CPAP         Objective:   There were no vitals taken for this visit.    Physical Exam:  Constitutional: Alert, no distress, well-groomed.  Skin: No rashes in visible areas.  Eye: Round. Conjunctiva clear, lids normal. No icterus.   ENMT: Lips pink without lesions, good dentition, moist mucous membranes. Phonation normal.  Neck: No masses, no thyromegaly. Moves freely without pain.  Respiratory: Unlabored respiratory effort, no cough or audible wheeze  Psych: Alert and oriented x3, normal affect and mood.     Assessment and Plan:   The following treatment plan was discussed:     1. Moderate nonproliferative diabetic retinopathy of right eye with macular edema associated with type 2 diabetes mellitus (HCC)  2. Diabetic polyneuropathy associated with type 2 diabetes mellitus (HCC)  - gabapentin (NEURONTIN) 600 MG tablet; Take 1 Tablet by mouth 2 times a day.   Dispense: 180 Tablet; Refill: 1    Patient doing well with medication.  We will continue with use at this time.    3. Moderate persistent asthma with acute exacerbation  - albuterol (PROVENTIL) 2.5mg/3ml Nebu Soln solution for nebulization; Take 3 mL by nebulization every four hours as needed for Shortness of Breath.  Dispense: 300 mL; Refill: 2    Medication refilled    4. Depression, unspecified depression type  - escitalopram (LEXAPRO) 5 MG tablet; Take 1 Tablet by mouth every day.  Dispense: 30 Tablet; Refill: 0    Patient requesting to come off medication.  Therefore we will start by decreasing her dose to 5 mg daily and follow-up in 4 weeks.    Other orders  - NOVOLOG 100 UNIT/ML Solution  - insulin aspart (NOVOLOG) 100 UNIT/ML Solution; Inject 180 units under the skin once daily via insulin pump (max of 180 units per day) 90 days supply    Follow-up: Return in about 4 weeks (around 12/15/2022), or if symptoms worsen or fail to improve.

## 2023-03-10 NOTE — TELEPHONE ENCOUNTER
Atif from CPAP services left a voicemail in regards to following up on a fax that was sent over. We need to provide a recent chart note faxed to [phone] that says the patient uses CPAP machine.  Please call back [phone] and use patient ID#[id number] as a reference   Talked with Marina and scheduled her surgery with Dr. Garcia for 03.16.23. A confirmation letter has been sent to her e-mail.

## 2023-03-13 NOTE — TELEPHONE ENCOUNTER
Chart reviewed. Pt admitted as inpatient day # 3 for Septicemia. Pt is on D[facility] MWF. From house with spouse, is active with [facility]. Psych consulted d/t suicidal ideations. Pt denies plan. Per psychiatry, will plan for OP counseling, advised wife to hide guns at home. Palliative care consulted for goals of care. Changed code status to limited x 4 and [facility] DNR signed and placed in chart. Nephrology planning HD today. ID recs for staph epi bacteremia. Cardiology following. SOB inproving. Remains on o2- 4lpm.    Plan remains to dc home with spouse and resume [facility] services. Will follow. Received request via: Patient    Was the patient seen in the last year in this department? Yes    Does the patient have an active prescription (recently filled or refills available) for medication(s) requested? No

## 2023-03-15 DIAGNOSIS — I10 ESSENTIAL HYPERTENSION: ICD-10-CM

## 2023-03-15 RX ORDER — LOSARTAN POTASSIUM 25 MG/1
TABLET ORAL
Qty: 180 TABLET | Refills: 0 | Status: SHIPPED | OUTPATIENT
Start: 2023-03-15 | End: 2023-06-15 | Stop reason: SDUPTHER

## 2023-03-15 NOTE — TELEPHONE ENCOUNTER
Was the patient seen in the last year in this department? Yes    Does patient have an active prescription for medications requested? Yes    Received Request Via: Pharmacy    Office Visit on 2022   Component Date Value    Report 2022                      [facility]    Test Date:  2022  Pt Name:    EFREM SPAULDING                   Department: [facility]  MRN:        [id number]                      Room:  Gender:     Female                       Technician:   :        1980                   Requested By:SHANNAN GRAVES  Order #:    [id number]                    Reading MD: Shannan Graves    Measurements  Intervals                                Axis  Rate:       86                           P:          26  AL:         128                          QRS:        44  QRSD:       92                           T:          43  QT:         380  QTc:        455    Interpretive Statements  SINUS RHYTHM  Small inferior Q waves of undetermined significance  No previous ECG available for comparison  Electronically Signed On 2022 15:21:49 PDT by Shannan Graves     ]

## 2023-03-22 DIAGNOSIS — E78.2 MIXED HYPERLIPIDEMIA: ICD-10-CM

## 2023-03-22 RX ORDER — ATORVASTATIN CALCIUM 40 MG/1
TABLET, FILM COATED ORAL
Qty: 30 TABLET | Refills: 0 | Status: SHIPPED | OUTPATIENT
Start: 2023-03-22 | End: 2023-04-06 | Stop reason: SDUPTHER

## 2023-03-22 NOTE — TELEPHONE ENCOUNTER
Received request via: Pharmacy    Was the patient seen in the last year in this department? Yes    Does the patient have an active prescription (recently filled or refills available) for medication(s) requested? No    Does the patient have long-term Plus and need 100 day supply (blood pressure, diabetes and cholesterol meds only)? Patient does not have SCP    Last OV: 11/17/22  Last labs: 2/27/23

## 2023-03-23 NOTE — PROCEDURE: MIPS QUALITY
Detail Level: Detailed
Quality 130: Documentation Of Current Medications In The Medical Record: Current Medications Documented
Topical Sulfur Applications Pregnancy And Lactation Text: This medication is considered safe during pregnancy and breast feeding secondary to limited systemic absorption.

## 2023-04-06 DIAGNOSIS — E78.2 MIXED HYPERLIPIDEMIA: ICD-10-CM

## 2023-04-06 RX ORDER — ATORVASTATIN CALCIUM 40 MG/1
40 TABLET, FILM COATED ORAL DAILY
Qty: 100 TABLET | Refills: 1 | Status: SHIPPED | OUTPATIENT
Start: 2023-04-06 | End: 2024-01-02 | Stop reason: SDUPTHER

## 2023-04-06 NOTE — TELEPHONE ENCOUNTER
Received request via: Pharmacy    Was the patient seen in the last year in this department? Yes    Does the patient have an active prescription (recently filled or refills available) for medication(s) requested?  Pharmacy requesting refill of 100 quantity and 3 refills per insurance.    Does the patient have nursing home Plus and need 100 day supply (blood pressure, diabetes and cholesterol meds only)? Patient does not have SCP

## 2023-04-24 RX ORDER — GABAPENTIN 300 MG/1
CAPSULE ORAL
Qty: 360 CAPSULE | Refills: 0 | Status: SHIPPED | OUTPATIENT
Start: 2023-04-24 | End: 2024-01-02 | Stop reason: SDUPTHER

## 2023-04-24 NOTE — TELEPHONE ENCOUNTER
Received request via: Pharmacy    Was the patient seen in the last year in this department? Yes    Does the patient have an active prescription (recently filled or refills available) for medication(s) requested? No    Does the patient have intermediate Plus and need 100 day supply (blood pressure, diabetes and cholesterol meds only)? Patient does not have SCP    Last OV: 11/17/22  Last labs: 4/24/23

## 2023-06-15 DIAGNOSIS — I10 ESSENTIAL HYPERTENSION: ICD-10-CM

## 2023-06-15 RX ORDER — LOSARTAN POTASSIUM 25 MG/1
50 TABLET ORAL DAILY
Qty: 180 TABLET | Refills: 0 | Status: SHIPPED | OUTPATIENT
Start: 2023-06-15 | End: 2023-12-21 | Stop reason: SDUPTHER

## 2023-06-15 NOTE — TELEPHONE ENCOUNTER
Was the patient seen in the last year in this department? Yes    Does patient have an active prescription for medications requested? Yes    Received Request Via: Pharmacy    No visits with results within 1 Year(s) from this visit.   Latest known visit with results is:   Office Visit on 2022   Component Date Value    Report 2022                      Value:[facility]    Test Date:  2022  Pt Name:    EFREM SPAULDING                   Department: [facility]  MRN:        [id number]                      Room:  Gender:     Female                       Technician: MARISA  :        1980                   Requested By:ENOCH GRAVES  Order #:    [id number]                    Reading MD: Enoch Graves    Measurements  Intervals                                Axis  Rate:       86                           P:          26  OH:         128                          QRS:        44  QRSD:       92                           T:          43  QT:         380  QTc:        455    Interpretive Statements  SINUS RHYTHM  Small inferior Q waves of undetermined significance  No previous ECG available for comparison  Electronically Signed On 2022 15:21:49 PDT by Enoch Graves     ]

## 2023-10-04 NOTE — PROGRESS NOTES
Addended by: FADY OSORIO on: 10/4/2023 11:24 AM     Modules accepted: Orders     Complaint: Hospital f/u     Subjective:     Tarah Britton is a 39 y.o. female here today for     Encounter for examination following treatment at hospital  Seen [facility] following bout of CP. Has appointment with cards for stress test.    Morbid obesity with BMI of 50.0-59.9, adult (HCC)  Never heard back from Dr. Queen, was declined by Dr. Enriquez due to her mental capacity.    Type 2 diabetes mellitus, with long-term current use of insulin (HCC)  Currently on metformin, Tresiba, Novolog, Actos, Trulicity and Jardiance. Still having rashes under breasts and in skin creases.    Acquired hypothyroidism  On Synthroid 75 mcg/day       Current medicines (including changes today)  Current Outpatient Medications   Medication Sig Dispense Refill   • Empagliflozin 25 MG Tab Take 1 Tab by mouth every day. 30 Tab 5   • nystatin (MYCOSTATIN) powder Apply to beneath breasts and to skin folds twice a day 15 g 2   • metFORMIN ER (GLUCOPHAGE XR) 500 MG TABLET SR 24 HR TAKE TWO TABLETS (1000MG) BY MOUTH TWICE A  Tab 0   • gabapentin (NEURONTIN) 300 MG Cap TAKE TWO CAPSULES BY MOUTH TWICE A  Cap 0   • ADVAIR DISKUS 250-50 MCG/DOSE AEROSOL POWDER, BREATH ACTIVATED INHALE ONE PUFF BY MOUTH EVERY 12 HOURS 180 Inhaler 2   • losartan (COZAAR) 50 MG Tab TAKE ONE TABLET BY MOUTH DAILY 90 Tab 1   • TRESIBA FLEXTOUCH 200 UNIT/ML Solution Pen-injector INJECT 60 UNITS UNDER THE SKIN EVERY NIGHT AT BEDTIME 3 PEN 2   • Blood Glucose Test Strips Testing twice a day 60 Applicator 7   • Blood Glucose Monitoring Suppl Device Testing twice a day 1 Device 1   • lovastatin (MEVACOR) 40 MG tablet TAKE ONE TABLET BY MOUTH DAILY 90 Tab 1   • insulin aspart (NOVOLOG) 100 unit/ml injection PEN Inject 10 Units as instructed 3 times a day before meals. 5 PEN 11   • insulin aspart (NOVOLOG) 100 unit/ml injection PEN Inject 10 Units as instructed 3 times a day before meals. 5 PEN 5   • Dulaglutide (TRULICITY) 1.5 MG/0.5ML Solution Pen-injector Inject  0.5 mL as instructed every 7 days. 4 PEN 6   • pioglitazone (ACTOS) 30 MG Tab Take 1 Tab by mouth every day. 30 Tab 11   • levothyroxine (SYNTHROID) 75 MCG Tab Take 1 Tab by mouth Every morning on an empty stomach. 90 Tab 1   • Insulin Pen Needle 32G X 4 MM Misc 1 Each by Does not apply route 4 times a day. 120 Each 11   • albuterol (PROVENTIL) 2.5mg/3ml NEBU 3 mL by Nebulization route every four hours as needed for Shortness of Breath. 100 Bullet 3     No current facility-administered medications for this visit.      She  has a past medical history of Anxiety, ASTHMA, Depressed, Depression, Diabetes, DM (diabetes mellitus) ([facility]), HTN (hypertension), Hyperlipidemia LDL goal <70, Morbid obesity with BMI of 50.0-59.9, adult ([facility]), Psychiatric disorder, Sleep apnea, and Uncontrolled type 2 diabetes mellitus with neurologic complication, without long-term current use of insulin ([facility]).    Health Maintenance:       Allergies: Patient has no known allergies.    Current Outpatient Medications Ordered in Epic   Medication Sig Dispense Refill   • Empagliflozin 25 MG Tab Take 1 Tab by mouth every day. 30 Tab 5   • nystatin (MYCOSTATIN) powder Apply to beneath breasts and to skin folds twice a day 15 g 2   • metFORMIN ER (GLUCOPHAGE XR) 500 MG TABLET SR 24 HR TAKE TWO TABLETS (1000MG) BY MOUTH TWICE A  Tab 0   • gabapentin (NEURONTIN) 300 MG Cap TAKE TWO CAPSULES BY MOUTH TWICE A  Cap 0   • ADVAIR DISKUS 250-50 MCG/DOSE AEROSOL POWDER, BREATH ACTIVATED INHALE ONE PUFF BY MOUTH EVERY 12 HOURS 180 Inhaler 2   • losartan (COZAAR) 50 MG Tab TAKE ONE TABLET BY MOUTH DAILY 90 Tab 1   • TRESIBA FLEXTOUCH 200 UNIT/ML Solution Pen-injector INJECT 60 UNITS UNDER THE SKIN EVERY NIGHT AT BEDTIME 3 PEN 2   • Blood Glucose Test Strips Testing twice a day 60 Applicator 7   • Blood Glucose Monitoring Suppl Device Testing twice a day 1 Device 1   • lovastatin (MEVACOR) 40 MG tablet TAKE ONE TABLET BY MOUTH DAILY 90 Tab 1   •  insulin aspart (NOVOLOG) 100 unit/ml injection PEN Inject 10 Units as instructed 3 times a day before meals. 5 PEN 11   • insulin aspart (NOVOLOG) 100 unit/ml injection PEN Inject 10 Units as instructed 3 times a day before meals. 5 PEN 5   • Dulaglutide (TRULICITY) 1.5 MG/0.5ML Solution Pen-injector Inject 0.5 mL as instructed every 7 days. 4 PEN 6   • pioglitazone (ACTOS) 30 MG Tab Take 1 Tab by mouth every day. 30 Tab 11   • levothyroxine (SYNTHROID) 75 MCG Tab Take 1 Tab by mouth Every morning on an empty stomach. 90 Tab 1   • Insulin Pen Needle 32G X 4 MM Misc 1 Each by Does not apply route 4 times a day. 120 Each 11   • albuterol (PROVENTIL) 2.5mg/3ml NEBU 3 mL by Nebulization route every four hours as needed for Shortness of Breath. 100 Bullet 3     No current [facility]-ordered facility-administered medications on file.        Past Medical History:   Diagnosis Date   • Anxiety    • ASTHMA    • Depressed    • Depression    • Diabetes    • DM (diabetes mellitus) ([facility])    • HTN (hypertension)    • Hyperlipidemia LDL goal <70    • Morbid obesity with BMI of 50.0-59.9, adult ([facility])    • Psychiatric disorder     depressed   • Sleep apnea    • Uncontrolled type 2 diabetes mellitus with neurologic complication, without long-term current use of insulin ([facility])        Past Surgical History:   Procedure Laterality Date   • ABDOMINAL HYSTERECTOMY TOTAL     • APPENDECTOMY     • CHOLECYSTECTOMY     • CLUB FOOT RELEASE         Social History     Tobacco Use   • Smoking status: Never Smoker   • Smokeless tobacco: Former User   Substance Use Topics   • Alcohol use: No   • Drug use: No       Social History     Social History Narrative    ** Merged History Encounter **            Family History   Problem Relation Age of Onset   • Heart Disease Sister          ROS   Patient denies any fever, chills, unintentional weight gain/loss, fatigue, stroke symptoms, dizziness, headache, nasal congestion, sore-throat, cough, heartburn, chest pain,  "difficulty breathing, abdominal discomfort, diarrhea/constipation, burning with urination or frequency, joint or back pain, skin rashes, depression or anxiety.       Objective:     /70 (BP Location: Left arm, Patient Position: Sitting)   Pulse 84   Temp 36.3 °C (97.4 °F) (Temporal)   Ht 1.626 m (5' 4\")   Wt (!) 136.8 kg (301 lb 9.6 oz)   SpO2 100%  Body mass index is 51.77 kg/m².   Physical Exam:  Constitutional: Alert, no distress.  Psych: Alert and oriented x3, appropriate affect and mood.        Assessment and Plan:   The following treatment plan was discussed    1. Morbid obesity with BMI of 50.0-59.9, adult ([facility])  Would definitely benefit from weight loss surgery.  - REFERRAL TO BARIATRIC SURGERY - Dr. Spring [facility]    2. Uncontrolled type 2 diabetes mellitus with neurologic complication, without long-term current use of insulin ([facility])  A1c 8.6%. Will increase her Jardiance to 25 mg. Will notify with lab test results.  - POCT Hemoglobin A1C  - CBC WITH DIFFERENTIAL; Future  - Comp Metabolic Panel; Future  - Lipid Profile; Future  - MICROALBUMIN CREAT RATIO URINE; Future  - Empagliflozin 25 MG Tab; Take 1 Tab by mouth every day.  Dispense: 30 Tab; Refill: 5    3. Acquired hypothyroidism  Will notify with lab test results. Goal TSH between  1 and 2.  - TSH WITH REFLEX TO FT4; Future    4. Encounter for screening mammogram for breast cancer  Baseline.  - MA-SCREENING MAMMO BILAT W/TOMOSYNTHESIS W/CAD; Future    5. Intertrigo  Reviewed proper skin care and prevention of infection.  - nystatin (MYCOSTATIN) powder; Apply to beneath breasts and to skin folds twice a day  Dispense: 15 g; Refill: 2        Followup: Return in about 3 months (around 6/4/2020).    Please note that this dictation was created using voice recognition software. I have made every reasonable attempt to correct obvious errors, but I expect that there are errors of grammar and possibly content that I did not discover before finalizing the " note.

## 2023-12-21 ENCOUNTER — APPOINTMENT (OUTPATIENT)
Dept: MEDICAL GROUP | Facility: CLINIC | Age: 43
End: 2023-12-21
Payer: MEDICARE

## 2023-12-21 DIAGNOSIS — E03.9 ACQUIRED HYPOTHYROIDISM: ICD-10-CM

## 2023-12-21 DIAGNOSIS — I10 ESSENTIAL HYPERTENSION: ICD-10-CM

## 2023-12-21 RX ORDER — LOSARTAN POTASSIUM 25 MG/1
50 TABLET ORAL DAILY
Qty: 28 TABLET | Refills: 0 | Status: SHIPPED | OUTPATIENT
Start: 2023-12-21 | End: 2024-01-02 | Stop reason: SDUPTHER

## 2023-12-21 RX ORDER — LEVOTHYROXINE SODIUM 0.1 MG/1
100 TABLET ORAL
Qty: 14 TABLET | Refills: 0 | Status: SHIPPED | OUTPATIENT
Start: 2023-12-21 | End: 2024-01-02 | Stop reason: SDUPTHER

## 2023-12-21 NOTE — TELEPHONE ENCOUNTER
Received request via: Patient    Was the patient seen in the last year in this department? Yes    Does the patient have an active prescription (recently filled or refills available) for medication(s) requested? No    Does the patient have [facility] Plus and need 100 day supply (blood pressure, diabetes and cholesterol meds only)? Patient does not have SCP

## 2024-01-02 ENCOUNTER — OFFICE VISIT (OUTPATIENT)
Dept: MEDICAL GROUP | Facility: CLINIC | Age: 44
End: 2024-01-02
Payer: MEDICARE

## 2024-01-02 ENCOUNTER — HOSPITAL ENCOUNTER (OUTPATIENT)
Facility: MEDICAL CENTER | Age: 44
End: 2024-01-02
Attending: PHYSICIAN ASSISTANT
Payer: MEDICARE

## 2024-01-02 VITALS
WEIGHT: 293 LBS | BODY MASS INDEX: 50.02 KG/M2 | DIASTOLIC BLOOD PRESSURE: 62 MMHG | HEIGHT: 64 IN | HEART RATE: 84 BPM | TEMPERATURE: 97.8 F | RESPIRATION RATE: 20 BRPM | OXYGEN SATURATION: 96 % | SYSTOLIC BLOOD PRESSURE: 138 MMHG

## 2024-01-02 DIAGNOSIS — E11.40 TYPE 2 DIABETES MELLITUS WITH DIABETIC NEUROPATHY, WITH LONG-TERM CURRENT USE OF INSULIN (HCC): ICD-10-CM

## 2024-01-02 DIAGNOSIS — Z79.4 TYPE 2 DIABETES MELLITUS WITH DIABETIC NEUROPATHY, WITH LONG-TERM CURRENT USE OF INSULIN (HCC): ICD-10-CM

## 2024-01-02 DIAGNOSIS — E55.9 VITAMIN D DEFICIENCY: ICD-10-CM

## 2024-01-02 DIAGNOSIS — E78.2 MIXED HYPERLIPIDEMIA: ICD-10-CM

## 2024-01-02 DIAGNOSIS — I10 ESSENTIAL HYPERTENSION: ICD-10-CM

## 2024-01-02 DIAGNOSIS — E03.9 ACQUIRED HYPOTHYROIDISM: ICD-10-CM

## 2024-01-02 DIAGNOSIS — E11.42 DIABETIC POLYNEUROPATHY ASSOCIATED WITH TYPE 2 DIABETES MELLITUS (HCC): ICD-10-CM

## 2024-01-02 DIAGNOSIS — U07.1 COVID-19: ICD-10-CM

## 2024-01-02 DIAGNOSIS — R09.02 HYPOXIA: ICD-10-CM

## 2024-01-02 PROBLEM — T25.321A THIRD DEGREE BURN OF RIGHT FOOT: Status: ACTIVE | Noted: 2023-04-25

## 2024-01-02 LAB
25(OH)D3 SERPL-MCNC: 10 NG/ML (ref 30–100)
ALBUMIN SERPL BCP-MCNC: 3.6 G/DL (ref 3.2–4.9)
ALBUMIN/GLOB SERPL: 1 G/DL
ALP SERPL-CCNC: 119 U/L (ref 30–99)
ALT SERPL-CCNC: 29 U/L (ref 2–50)
ANION GAP SERPL CALC-SCNC: 12 MMOL/L (ref 7–16)
AST SERPL-CCNC: 15 U/L (ref 12–45)
BILIRUB SERPL-MCNC: 0.4 MG/DL (ref 0.1–1.5)
BUN SERPL-MCNC: 30 MG/DL (ref 8–22)
CALCIUM ALBUM COR SERPL-MCNC: 9.3 MG/DL (ref 8.5–10.5)
CALCIUM SERPL-MCNC: 9 MG/DL (ref 8.5–10.5)
CHLORIDE SERPL-SCNC: 105 MMOL/L (ref 96–112)
CHOLEST SERPL-MCNC: 140 MG/DL (ref 100–199)
CO2 SERPL-SCNC: 22 MMOL/L (ref 20–33)
CREAT SERPL-MCNC: 0.82 MG/DL (ref 0.5–1.4)
GFR SERPLBLD CREATININE-BSD FMLA CKD-EPI: 90 ML/MIN/1.73 M 2
GLOBULIN SER CALC-MCNC: 3.6 G/DL (ref 1.9–3.5)
GLUCOSE SERPL-MCNC: 132 MG/DL (ref 65–99)
HDLC SERPL-MCNC: 45 MG/DL
LDLC SERPL CALC-MCNC: 66 MG/DL
POTASSIUM SERPL-SCNC: 4.5 MMOL/L (ref 3.6–5.5)
PROT SERPL-MCNC: 7.2 G/DL (ref 6–8.2)
SODIUM SERPL-SCNC: 139 MMOL/L (ref 135–145)
TRIGL SERPL-MCNC: 147 MG/DL (ref 0–149)
TSH SERPL DL<=0.005 MIU/L-ACNC: 8.52 UIU/ML (ref 0.38–5.33)

## 2024-01-02 PROCEDURE — 84439 ASSAY OF FREE THYROXINE: CPT

## 2024-01-02 PROCEDURE — 99214 OFFICE O/P EST MOD 30 MIN: CPT | Performed by: PHYSICIAN ASSISTANT

## 2024-01-02 PROCEDURE — 3078F DIAST BP <80 MM HG: CPT | Performed by: PHYSICIAN ASSISTANT

## 2024-01-02 PROCEDURE — 82306 VITAMIN D 25 HYDROXY: CPT

## 2024-01-02 PROCEDURE — 80053 COMPREHEN METABOLIC PANEL: CPT

## 2024-01-02 PROCEDURE — 80061 LIPID PANEL: CPT

## 2024-01-02 PROCEDURE — 3075F SYST BP GE 130 - 139MM HG: CPT | Performed by: PHYSICIAN ASSISTANT

## 2024-01-02 PROCEDURE — 84443 ASSAY THYROID STIM HORMONE: CPT

## 2024-01-02 RX ORDER — BLOOD SUGAR DIAGNOSTIC
1 STRIP MISCELLANEOUS
COMMUNITY
Start: 2023-09-05

## 2024-01-02 RX ORDER — INSULIN GLARGINE 300 U/ML
30 INJECTION, SOLUTION SUBCUTANEOUS DAILY
COMMUNITY
Start: 2023-07-12

## 2024-01-02 RX ORDER — TIRZEPATIDE 5 MG/.5ML
INJECTION, SOLUTION SUBCUTANEOUS
COMMUNITY
Start: 2023-10-07

## 2024-01-02 RX ORDER — LOSARTAN POTASSIUM 25 MG/1
50 TABLET ORAL DAILY
Qty: 90 TABLET | Refills: 0 | Status: SHIPPED | OUTPATIENT
Start: 2024-01-02 | End: 2024-01-09

## 2024-01-02 RX ORDER — INSULIN PMP CART,AUT,G6/7,CNTR
EACH SUBCUTANEOUS
COMMUNITY
Start: 2023-12-28

## 2024-01-02 RX ORDER — LEVOTHYROXINE SODIUM 0.1 MG/1
100 TABLET ORAL
Qty: 90 TABLET | Refills: 0 | Status: SHIPPED | OUTPATIENT
Start: 2024-01-02

## 2024-01-02 RX ORDER — GABAPENTIN 300 MG/1
CAPSULE ORAL
Qty: 360 CAPSULE | Refills: 0 | Status: SHIPPED | OUTPATIENT
Start: 2024-01-02

## 2024-01-02 RX ORDER — ATORVASTATIN CALCIUM 40 MG/1
40 TABLET, FILM COATED ORAL DAILY
Qty: 100 TABLET | Refills: 0 | Status: SHIPPED | OUTPATIENT
Start: 2024-01-02

## 2024-01-02 ASSESSMENT — FIBROSIS 4 INDEX: FIB4 SCORE: 0.33

## 2024-01-02 NOTE — LETTER
[facility]  Rosa Gross P.A.-C.  [address]  [facility] [address]  Fax: [phone]   Authorization for Release/Disclosure of   Protected Health Information   Name: EFREM BRITTON : 1980 SSN: [id number]   Address: [facility] [place] [address] Phone:    [phone] (home)    I authorize the entity listed below to release/disclose the PHI below to:   [facility]/Rosa Gross P.A.-C. and Rosa Gross P.A.-C.   Provider or Entity Name:  [facility]     Address   City, [facility]   Phone:      Fax:     Reason for request: continuity of care   Information to be released:    [  ] LAST COLONOSCOPY,  including any PATH REPORT and follow-up  [  ] LAST FIT/COLOGUARD RESULT [  ] LAST DEXA  [  ] LAST MAMMOGRAM  [  ] LAST PAP  [  ] LAST LABS [  ] RETINA EXAM REPORT  [  ] IMMUNIZATION RECORDS  [ x ] Release all info      [  ] Check here and initial the line next to each item to release ALL health information INCLUDING  _____ Care and treatment for drug and / or alcohol abuse  _____ HIV testing, infection status, or AIDS  _____ Genetic Testing    DATES OF SERVICE OR TIME PERIOD TO BE DISCLOSED: _____________  I understand and acknowledge that:  * This Authorization may be revoked at any time by you in writing, except if your health information has already been used or disclosed.  * Your health information that will be used or disclosed as a result of you signing this authorization could be re-disclosed by the recipient. If this occurs, your re-disclosed health information may no longer be protected by State or Federal laws.  * You may refuse to sign this Authorization. Your refusal will not affect your ability to obtain treatment.  * This Authorization becomes effective upon signing and will  on (date) __________.      If no date is indicated, this Authorization will  one (1) year from the signature date.    Name: Efrem Britton  Signature: Date:   2024      PLEASE FAX REQUESTED RECORDS BACK TO: [phone]

## 2024-01-02 NOTE — PROGRESS NOTES
cc:  thyroid    Subjective:     Tarah Britton is a 43 y.o. female presenting for thyroid      Patient presents to the office for  thyroid.  Patient is overdue for visit and presents requesting medication refills.  Patient is needing medication refills for her thyroid, her cholesterol, her hypertension, gabapentin for her diabetic peripheral neuropathy.  She also has a vitamin D deficiency but since being seen has developed an allergy to vitamin D medication.    Patient states that she was admitted to the hospital for 2 days due to covid.  She states that she was admitted as she could not breathe.  She states that her symptoms have improved.  She states that she was on on O2 while in the hospital.  She states that she also had an echo.      Patient states that she is allergic to vitamin D medication.      Review of systems:  See above.   Denies any symptoms unless previously indicated.        Current Outpatient Medications:     glucose blood (ONETOUCH VERIO) strip, 1 Strip by Extracorporeal route., Disp: , Rfl:     Insulin Disposable Pump (OMNIPOD 5 G6 POD, GEN 5,) Misc, , Disp: , Rfl:     TOUJEO MAX SOLOSTAR 300 UNIT/ML Solution Pen-injector, Inject 30 Units under the skin every day., Disp: , Rfl:     MOUNJARO 5 MG/0.5ML Solution Pen-injector, , Disp: , Rfl:     gabapentin (NEURONTIN) 300 MG Cap, TAKE TWO CAPSULES BY MOUTH TWICE A DAY, Disp: 360 Capsule, Rfl: 0    levothyroxine (SYNTHROID) 100 MCG Tab, Take 1 Tablet by mouth every morning on an empty stomach. ON EMPTY STOMACH, Disp: 90 Tablet, Rfl: 0    losartan (COZAAR) 25 MG Tab, Take 2 Tablets by mouth every day., Disp: 90 Tablet, Rfl: 0    atorvastatin (LIPITOR) 40 MG Tab, Take 1 Tablet by mouth every day., Disp: 100 Tablet, Rfl: 0    escitalopram (LEXAPRO) 5 MG tablet, TAKE ONE TABLET BY MOUTH DAILY, Disp: 30 Tablet, Rfl: 4    NOVOLOG 100 UNIT/ML Solution, , Disp: , Rfl:     insulin aspart (NOVOLOG) 100 UNIT/ML Solution, Inject 180 units under the skin once  "daily via insulin pump (max of 180 units per day) 90 days supply, Disp: , Rfl:     albuterol (PROVENTIL) 2.5mg/3ml Nebu Soln solution for nebulization, Take 3 mL by nebulization every four hours as needed for Shortness of Breath., Disp: 300 mL, Rfl: 2    baclofen (LIORESAL) 20 MG tablet, TAKE ONE TABLET BY MOUTH TWICE A DAY, Disp: 60 Tablet, Rfl: 0    pioglitazone (ACTOS) 30 MG Tab, TAKE ONE TABLET BY MOUTH DAILY, Disp: 30 Tablet, Rfl: 0    metFORMIN ER (GLUCOPHAGE XR) 500 MG TABLET SR 24 HR, TAKE TWO TABLETS BY MOUTH TWICE A DAY (Patient taking differently: Take 1,000 mg by mouth 2 times a day. TAKE TWO TABLETS BY MOUTH TWICE A DAY), Disp: 60 Tablet, Rfl: 0    Empagliflozin (JARDIANCE) 25 MG Tab, Take 1 Tablet by mouth every day. TAKE ONE TABLET BY MOUTH DAILY, Disp: 30 Tablet, Rfl: 0    Continuous Blood Gluc Sensor (DEXCOM G6 SENSOR) Misc, 1 Each every 10 days., Disp: 9 Each, Rfl: 0    insulin lispro (HUMALOG,ADMELOG) 100 UNIT/ML Solution Pen-injector injection PEN, Inject 11 Units under the skin 3 times a day before meals., Disp: 5 Each, Rfl: 0    Alcohol Swabs (PHARMACIST CHOICE ALCOHOL) Pads, , Disp: , Rfl:     Insulin Pen Needle 32G X 4 MM Misc, 1 Each by Does not apply route 4 times a day., Disp: 120 Each, Rfl: 11    Allergies, past medical history, past surgical history, family history, social history reviewed and updated    Objective:     Vitals: /62 (BP Location: Left arm, Patient Position: Sitting, BP Cuff Size: Adult long)   Pulse 84   Temp 36.6 °C (97.8 °F) (Temporal)   Resp 20   Ht 1.626 m (5' 4\")   Wt (!) 179 kg (393 lb 15.4 oz)   LMP  (LMP Unknown)   SpO2 96%   BMI 67.62 kg/m²   General: Alert, pleasant, NAD  EYES:   PERRL, EOMI, no icterus or pallor.  Conjunctivae and lids normal.   HENT:  Normocephalic.  External ears normal.   Neck supple.    Respiratory: Normal respiratory effort.    Abdomen: obese  Skin: Warm, dry, no rashes.  Musculoskeletal: Gait is normal.  Moves all extremities " well.    Neurological: No tremors, sensation grossly intact,  CN2-12 intact.  Psych:  Affect/mood is normal, judgement is good, memory is intact, grooming is appropriate.    Assessment/Plan:     Tarah was seen today for medication refill and requesting labs.    Diagnoses and all orders for this visit:    Acquired hypothyroidism  -     TSH WITH REFLEX TO FT4; Future  -     levothyroxine (SYNTHROID) 100 MCG Tab; Take 1 Tablet by mouth every morning on an empty stomach. ON EMPTY STOMACH  Diabetic polyneuropathy associated with type 2 diabetes mellitus (HCC)  -     gabapentin (NEURONTIN) 300 MG Cap; TAKE TWO CAPSULES BY MOUTH TWICE A DAY  Type 2 diabetes mellitus with diabetic neuropathy, with long-term current use of insulin (HCC)  -     Comp Metabolic Panel; Future  -     Lipid Profile; Future  Mixed hyperlipidemia  -     Comp Metabolic Panel; Future  -     Lipid Profile; Future  -     atorvastatin (LIPITOR) 40 MG Tab; Take 1 Tablet by mouth every day.  Essential hypertension  -     losartan (COZAAR) 25 MG Tab; Take 2 Tablets by mouth every day.    I have refilled medications for 90 days supply.  Patient will have her labs drawn today.  Follow-up in 3 months.    Vitamin D deficiency  -     VITAMIN D,25 HYDROXY (DEFICIENCY); Future    Labs ordered at this time.  However patient allergic to vitamin D send him out.  Will not order vitamin D supplement at this time.    COVID  Hypoxia    Will obtain records from [facility].        Return in about 3 months (around 4/2/2024), or if symptoms worsen or fail to improve.    Please note that this dictation was created using voice recognition software. I have made every reasonable attempt to correct obvious errors, but expect that there are errors of grammar and possible content that I did not discover before finalizing note.

## 2024-01-03 DIAGNOSIS — E03.9 HYPOTHYROIDISM, UNSPECIFIED TYPE: ICD-10-CM

## 2024-01-03 LAB — T4 FREE SERPL-MCNC: 1.41 NG/DL (ref 0.93–1.7)

## 2024-01-06 DIAGNOSIS — I10 ESSENTIAL HYPERTENSION: ICD-10-CM

## 2024-01-08 ENCOUNTER — APPOINTMENT (OUTPATIENT)
Dept: MEDICAL GROUP | Facility: CLINIC | Age: 44
End: 2024-01-08
Payer: MEDICARE

## 2024-01-08 NOTE — TELEPHONE ENCOUNTER
Was the patient seen in the last year in this department? Yes    Does patient have an active prescription for medications requested? Yes    Received Request Via: Pharmacy    Hospital Outpatient Visit on 01/02/2024   Component Date Value    TSH 01/02/2024 8.520 (H)     25-Hydroxy   Vitamin D 25 01/02/2024 10 (L)     Sodium 01/02/2024 139     Potassium 01/02/2024 4.5     Chloride 01/02/2024 105     Co2 01/02/2024 22     Anion Gap 01/02/2024 12.0     Glucose 01/02/2024 132 (H)     Bun 01/02/2024 30 (H)     Creatinine 01/02/2024 0.82     Calcium 01/02/2024 9.0     Correct Calcium 01/02/2024 9.3     AST(SGOT) 01/02/2024 15     ALT(SGPT) 01/02/2024 29     Alkaline Phosphatase 01/02/2024 119 (H)     Total Bilirubin 01/02/2024 0.4     Albumin 01/02/2024 3.6     Total Protein 01/02/2024 7.2     Globulin 01/02/2024 3.6 (H)     A-G Ratio 01/02/2024 1.0     Cholesterol,Tot 01/02/2024 140     Triglycerides 01/02/2024 147     HDL 01/02/2024 45     LDL 01/02/2024 66     GFR (CKD-EPI) 01/02/2024 90     Free T-4 01/02/2024 1.41    ]

## 2024-01-09 RX ORDER — LOSARTAN POTASSIUM 25 MG/1
50 TABLET ORAL DAILY
Qty: 180 TABLET | Refills: 1 | Status: SHIPPED | OUTPATIENT
Start: 2024-01-09

## 2024-02-15 ENCOUNTER — OFFICE VISIT (OUTPATIENT)
Dept: URGENT CARE | Facility: PHYSICIAN GROUP | Age: 44
End: 2024-02-15
Payer: MEDICARE

## 2024-02-15 ENCOUNTER — APPOINTMENT (OUTPATIENT)
Dept: RADIOLOGY | Facility: IMAGING CENTER | Age: 44
End: 2024-02-15
Attending: STUDENT IN AN ORGANIZED HEALTH CARE EDUCATION/TRAINING PROGRAM
Payer: MEDICARE

## 2024-02-15 VITALS
DIASTOLIC BLOOD PRESSURE: 82 MMHG | RESPIRATION RATE: 20 BRPM | TEMPERATURE: 97.1 F | SYSTOLIC BLOOD PRESSURE: 132 MMHG | OXYGEN SATURATION: 98 % | HEART RATE: 92 BPM | BODY MASS INDEX: 50.02 KG/M2 | WEIGHT: 293 LBS | HEIGHT: 64 IN

## 2024-02-15 DIAGNOSIS — R05.9 COUGH, UNSPECIFIED TYPE: ICD-10-CM

## 2024-02-15 DIAGNOSIS — R06.02 SOB (SHORTNESS OF BREATH): ICD-10-CM

## 2024-02-15 DIAGNOSIS — J98.8 RTI (RESPIRATORY TRACT INFECTION): ICD-10-CM

## 2024-02-15 PROCEDURE — 99213 OFFICE O/P EST LOW 20 MIN: CPT | Performed by: STUDENT IN AN ORGANIZED HEALTH CARE EDUCATION/TRAINING PROGRAM

## 2024-02-15 PROCEDURE — 71046 X-RAY EXAM CHEST 2 VIEWS: CPT | Mod: TC,FY | Performed by: RADIOLOGY

## 2024-02-15 PROCEDURE — 3075F SYST BP GE 130 - 139MM HG: CPT | Performed by: STUDENT IN AN ORGANIZED HEALTH CARE EDUCATION/TRAINING PROGRAM

## 2024-02-15 PROCEDURE — 3079F DIAST BP 80-89 MM HG: CPT | Performed by: STUDENT IN AN ORGANIZED HEALTH CARE EDUCATION/TRAINING PROGRAM

## 2024-02-15 RX ORDER — DOXYCYCLINE HYCLATE 100 MG
100 TABLET ORAL 2 TIMES DAILY
Qty: 14 TABLET | Refills: 0 | Status: SHIPPED | OUTPATIENT
Start: 2024-02-15 | End: 2024-02-22

## 2024-02-15 RX ORDER — BENZONATATE 100 MG/1
100 CAPSULE ORAL 3 TIMES DAILY PRN
Qty: 60 CAPSULE | Refills: 0 | Status: SHIPPED | OUTPATIENT
Start: 2024-02-15

## 2024-02-15 ASSESSMENT — ENCOUNTER SYMPTOMS
COUGH: 1
SHORTNESS OF BREATH: 1

## 2024-02-15 ASSESSMENT — FIBROSIS 4 INDEX: FIB4 SCORE: 0.31

## 2024-02-16 NOTE — PROGRESS NOTES
"Subjective     Tarah grant-Tobi Britton is a 43 y.o. female who presents with Shortness of Breath (X 1 month) and Cough (X 1 month )            Cough  This is a new problem. Episode onset: approx. 1 month. The problem has been waxing and waning. The cough is Productive of sputum. Associated symptoms include nasal congestion, shortness of breath and wheezing. Pertinent negatives include no chest pain, chills, ear pain, fever, hemoptysis, myalgias or sore throat. Her past medical history is significant for asthma.       Review of Systems   Constitutional:  Negative for chills and fever.   HENT:  Negative for ear pain and sore throat.    Respiratory:  Positive for cough, shortness of breath and wheezing. Negative for hemoptysis.    Cardiovascular:  Negative for chest pain.   Musculoskeletal:  Negative for myalgias.              Objective     /82   Pulse 92   Temp 36.2 °C (97.1 °F) (Temporal)   Resp 20   Ht 1.626 m (5' 4\")   Wt (!) 182 kg (401 lb)   LMP  (LMP Unknown)   SpO2 98%   BMI 68.83 kg/m²      Physical Exam  Vitals reviewed.   Constitutional:       General: She is not in acute distress.     Appearance: Normal appearance. She is not ill-appearing or toxic-appearing.   HENT:      Head: Normocephalic and atraumatic.      Nose: Nose normal.      Mouth/Throat:      Mouth: Mucous membranes are moist.      Pharynx: Oropharynx is clear.   Eyes:      Extraocular Movements: Extraocular movements intact.      Conjunctiva/sclera: Conjunctivae normal.      Pupils: Pupils are equal, round, and reactive to light.   Cardiovascular:      Rate and Rhythm: Normal rate and regular rhythm.   Pulmonary:      Effort: Pulmonary effort is normal. No respiratory distress.      Breath sounds: Normal breath sounds. No stridor. No wheezing, rhonchi or rales.   Skin:     General: Skin is warm and dry.   Neurological:      General: No focal deficit present.      Mental Status: She is alert.                 RADIOLOGY RESULTS "   DX-CHEST-2 VIEWS    Result Date: 2/15/2024  2/15/2024 5:10 PM HISTORY/REASON FOR EXAM: Shortness of breath TECHNIQUE/EXAM DESCRIPTION AND NUMBER OF VIEWS: Two views of the chest. COMPARISON: None. FINDINGS: There is no evidence of focal consolidation or evidence of pulmonary edema. The heart is normal in size. There is no evidence of pleural effusion. Soft tissues and bony structures are unremarkable.     No evidence of acute cardiopulmonary process.                     Assessment & Plan          1. Cough, unspecified type  - DX-CHEST-2 VIEWS  - benzonatate (TESSALON) 100 MG Cap; Take 1 Capsule by mouth 3 times a day as needed for Cough.  Dispense: 60 Capsule; Refill: 0    2. SOB (shortness of breath)  - DX-CHEST-2 VIEWS    3. RTI (respiratory tract infection)  - doxycycline (VIBRAMYCIN) 100 MG Tab; Take 1 Tablet by mouth 2 times a day for 7 days.  Dispense: 14 Tablet; Refill: 0     DX-CHEST-2 VIEWS:  No evidence of acute cardiopulmonary process.    Differential diagnoses, supportive care measures and indications for immediate follow-up discussed with patient. Pathogenesis of diagnosis discussed including typical length and natural progression.  Follow up with PCP{. ER precautions discussed.    Instructed to return to urgent care or nearest emergency department if symptoms fail to improve, for any change in condition, further concerns, or new concerning symptoms.    Patient states understanding and agrees with the plan of care and discharge instructions.

## 2024-02-21 ASSESSMENT — ENCOUNTER SYMPTOMS
WHEEZING: 1
HEMOPTYSIS: 0
MYALGIAS: 0
FEVER: 0
CHILLS: 0
SORE THROAT: 0

## 2024-03-12 ENCOUNTER — OFFICE VISIT (OUTPATIENT)
Dept: MEDICAL GROUP | Facility: CLINIC | Age: 44
End: 2024-03-12
Payer: MEDICARE

## 2024-03-12 VITALS
HEART RATE: 81 BPM | TEMPERATURE: 96.8 F | OXYGEN SATURATION: 96 % | RESPIRATION RATE: 20 BRPM | DIASTOLIC BLOOD PRESSURE: 72 MMHG | BODY MASS INDEX: 50.02 KG/M2 | WEIGHT: 293 LBS | SYSTOLIC BLOOD PRESSURE: 118 MMHG | HEIGHT: 64 IN

## 2024-03-12 DIAGNOSIS — N32.81 OVERACTIVE BLADDER: ICD-10-CM

## 2024-03-12 DIAGNOSIS — R05.3 CHRONIC COUGH: ICD-10-CM
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
## 2024-03-12 DIAGNOSIS — E11.40 TYPE 2 DIABETES MELLITUS WITH DIABETIC NEUROPATHY, WITH LONG-TERM CURRENT USE OF INSULIN (HCC): ICD-10-CM

## 2024-03-12 DIAGNOSIS — J45.41 MODERATE PERSISTENT ASTHMA WITH ACUTE EXACERBATION: ICD-10-CM

## 2024-03-12 DIAGNOSIS — Z79.4 TYPE 2 DIABETES MELLITUS WITH DIABETIC NEUROPATHY, WITH LONG-TERM CURRENT USE OF INSULIN (HCC): ICD-10-CM

## 2024-03-12 PROCEDURE — 99214 OFFICE O/P EST MOD 30 MIN: CPT | Performed by: PHYSICIAN ASSISTANT

## 2024-03-12 PROCEDURE — 3078F DIAST BP <80 MM HG: CPT | Performed by: PHYSICIAN ASSISTANT

## 2024-03-12 PROCEDURE — 3074F SYST BP LT 130 MM HG: CPT | Performed by: PHYSICIAN ASSISTANT

## 2024-03-12 RX ORDER — PROCHLORPERAZINE 25 MG/1
SUPPOSITORY RECTAL
COMMUNITY
Start: 2024-02-14

## 2024-03-12 RX ORDER — FLUTICASONE PROPIONATE AND SALMETEROL 250; 50 UG/1; UG/1
1 POWDER RESPIRATORY (INHALATION) EVERY 12 HOURS
Qty: 1 EACH | Refills: 0 | Status: SHIPPED | OUTPATIENT
Start: 2024-03-12

## 2024-03-12 RX ORDER — OXYBUTYNIN CHLORIDE 5 MG/1
5 TABLET, EXTENDED RELEASE ORAL DAILY
Qty: 90 TABLET | Refills: 0 | Status: SHIPPED | OUTPATIENT
Start: 2024-03-12

## 2024-03-12 ASSESSMENT — PATIENT HEALTH QUESTIONNAIRE - PHQ9: CLINICAL INTERPRETATION OF PHQ2 SCORE: 0

## 2024-03-12 ASSESSMENT — FIBROSIS 4 INDEX: FIB4 SCORE: 0.31

## 2024-03-12 NOTE — PROGRESS NOTES
cc:  cough    Subjective:     Tarah Britton is a 43 y.o. female presenting for cough      Patient presents to the office for cough.  Patient states that she was seen in the urgent care in [place].  She was seen February 15th.  She states that she was also at [facility] for 2 nights as she could not breathe.  She states that she was told that she would need oxygen.  Patient states that she is having a hard time breathing at home.  She states that she fell at home last week and feels like it is because she cannot breathe.  She states that she had to crawl from the doorway to the couch to get up after the fall.  She does state in the past she has been diagnosed with asthma.     Patient states that she is having back and bladder issues.  These are ongoing issues. She states that she will have urinary incontinence.  She would like to start a bladder medication.      Review of systems:  See above.   Denies any symptoms unless previously indicated.        Current Outpatient Medications:     Continuous Blood Gluc Transmit (DEXCOM G6 TRANSMITTER) Misc, , Disp: , Rfl:     fluticasone-salmeterol (ADVAIR) 250-50 MCG/ACT AEROSOL POWDER, BREATH ACTIVATED, Inhale 1 Puff every 12 hours., Disp: 1 Each, Rfl: 0    oxybutynin SR (DITROPAN-XL) 5 MG TABLET SR 24 HR, Take 1 Tablet by mouth every day., Disp: 90 Tablet, Rfl: 0    benzonatate (TESSALON) 100 MG Cap, Take 1 Capsule by mouth 3 times a day as needed for Cough., Disp: 60 Capsule, Rfl: 0    losartan (COZAAR) 25 MG Tab, TAKE 2 TABLETS BY MOUTH DAILY, Disp: 180 Tablet, Rfl: 1    glucose blood (ONETOUCH VERIO) strip, 1 Strip by Extracorporeal route., Disp: , Rfl:     Insulin Disposable Pump (OMNIPOD 5 G6 POD, GEN 5,) Misc, , Disp: , Rfl:     TOUJEO MAX SOLOSTAR 300 UNIT/ML Solution Pen-injector, Inject 30 Units under the skin every day., Disp: , Rfl:     MOUNJARO 5 MG/0.5ML Solution Pen-injector, , Disp: , Rfl:     gabapentin (NEURONTIN) 300 MG Cap, TAKE TWO CAPSULES  BY MOUTH TWICE A DAY, Disp: 360 Capsule, Rfl: 0    levothyroxine (SYNTHROID) 100 MCG Tab, Take 1 Tablet by mouth every morning on an empty stomach. ON EMPTY STOMACH, Disp: 90 Tablet, Rfl: 0    atorvastatin (LIPITOR) 40 MG Tab, Take 1 Tablet by mouth every day., Disp: 100 Tablet, Rfl: 0    escitalopram (LEXAPRO) 5 MG tablet, TAKE ONE TABLET BY MOUTH DAILY, Disp: 30 Tablet, Rfl: 4    NOVOLOG 100 UNIT/ML Solution, , Disp: , Rfl:     insulin aspart (NOVOLOG) 100 UNIT/ML Solution, Inject 180 units under the skin once daily via insulin pump (max of 180 units per day) 90 days supply, Disp: , Rfl:     albuterol (PROVENTIL) 2.5mg/3ml Nebu Soln solution for nebulization, Take 3 mL by nebulization every four hours as needed for Shortness of Breath., Disp: 300 mL, Rfl: 2    pioglitazone (ACTOS) 30 MG Tab, TAKE ONE TABLET BY MOUTH DAILY, Disp: 30 Tablet, Rfl: 0    metFORMIN ER (GLUCOPHAGE XR) 500 MG TABLET SR 24 HR, TAKE TWO TABLETS BY MOUTH TWICE A DAY (Patient taking differently: Take 1,000 mg by mouth 2 times a day. TAKE TWO TABLETS BY MOUTH TWICE A DAY), Disp: 60 Tablet, Rfl: 0    Empagliflozin (JARDIANCE) 25 MG Tab, Take 1 Tablet by mouth every day. TAKE ONE TABLET BY MOUTH DAILY, Disp: 30 Tablet, Rfl: 0    Continuous Blood Gluc Sensor (DEXCOM G6 SENSOR) Misc, 1 Each every 10 days., Disp: 9 Each, Rfl: 0    insulin lispro (HUMALOG,ADMELOG) 100 UNIT/ML Solution Pen-injector injection PEN, Inject 11 Units under the skin 3 times a day before meals., Disp: 5 Each, Rfl: 0    Insulin Pen Needle 32G X 4 MM Misc, 1 Each by Does not apply route 4 times a day., Disp: 120 Each, Rfl: 11    baclofen (LIORESAL) 20 MG tablet, TAKE ONE TABLET BY MOUTH TWICE A DAY (Patient not taking: Reported on 3/12/2024), Disp: 60 Tablet, Rfl: 0    Alcohol Swabs (PHARMACIST CHOICE ALCOHOL) Pads, , Disp: , Rfl:     Allergies, past medical history, past surgical history, family history, social history reviewed and updated    Objective:     Vitals: /72  "(BP Location: Left arm, Patient Position: Sitting, BP Cuff Size: Adult)   Pulse 81   Temp 36 °C (96.8 °F) (Temporal)   Resp 20   Ht 1.626 m (5' 4\")   Wt (!) 183 kg (403 lb 14.1 oz)   LMP  (LMP Unknown)   SpO2 96%   BMI 69.33 kg/m²   General: Alert, pleasant, NAD  EYES:   PERRL, EOMI, no icterus or pallor.  Conjunctivae and lids normal.   HENT:  Normocephalic.  External ears normal.   Neck supple.     Heart: Regular rate and rhythm.  S1 and S2 normal.  No murmurs appreciated.  Respiratory: Normal respiratory effort.  Clear to auscultation bilaterally.    Abdomen: obese  Skin: Warm, dry, no rashes.  Musculoskeletal: Gait is normal.  Moves all extremities well.    Extremities: normal range of motion all extremities.   Neurological: No tremors, sensation grossly intact,  gait is normal, CN2-12 intact.  Psych:  Affect/mood is normal, judgement is good, memory is intact, grooming is appropriate.    Assessment/Plan:     Tarah russTobi was seen today for hospital follow-up and urinary frequency.    Diagnoses and all orders for this visit:    Fall  Chronic cough  -     PULMONARY FUNCTION TESTS -Test requested: Spirometry with-out & with Bronchodilator; Future  -     fluticasone-salmeterol (ADVAIR) 250-50 MCG/ACT AEROSOL POWDER, BREATH ACTIVATED; Inhale 1 Puff every 12 hours.  Moderate persistent asthma with acute exacerbation  -     PULMONARY FUNCTION TESTS -Test requested: Spirometry with-out & with Bronchodilator; Future  -     fluticasone-salmeterol (ADVAIR) 250-50 MCG/ACT AEROSOL POWDER, BREATH ACTIVATED; Inhale 1 Puff every 12 hours.    Patient was seen at [facility] per her report.  Patient does have asthma.  Oxygen levels are normal at 96% in office today.  Will obtain records.  Will start patient on an Advair inhaler and if records indicate hypoxia with need of oxygen, we will submit orders.  May need to do a walking test at follow-up.  In the meantime we will also order a pulmonary function test.  " Patient to start Advair twice a day and follow-up in 2 weeks.  If patient is hypoxic events are contributing to falls, we will need to start oxygen.    Overactive bladder  -     oxybutynin SR (DITROPAN-XL) 5 MG TABLET SR 24 HR; Take 1 Tablet by mouth every day.    Patient wishes to start medication for overactive bladder.  Will start her on oxybutynin 5 mg daily.  Patient advised of potential side effects and risks of medication.    Type 2 diabetes mellitus with diabetic neuropathy, with long-term current use of insulin (HCC)    May be contributing to symptoms.  Patient does see endocrinology.        Return in about 2 weeks (around 3/26/2024), or if symptoms worsen or fail to improve.    Please note that this dictation was created using voice recognition software. I have made every reasonable attempt to correct obvious errors, but expect that there are errors of grammar and possible content that I did not discover before finalizing note.

## 2024-03-12 NOTE — LETTER
[place]  Rosa Gross P.A.-C.  [address] [facility] [address]  [facility] [address]  Fax: [phone]   Authorization for Release/Disclosure of   Protected Health Information   Name: EFREM SPAULDING : 1980 SSN: [id number]   Address: [address] [place] [address]  [place] [address] Phone:    [phone] (home)    I authorize the entity listed below to release/disclose the PHI below to:   [facility]/Rosa Gross P.A.-C. and Rosa Gross P.A.-C.   Provider or Entity Name:  [facility]   City, [facility], Zip  [address] [place] [address] Phone:  [phone]    Fax:  [phone]   Reason for request: continuity of care   Information to be released:    [  ] LAST COLONOSCOPY,  including any PATH REPORT and follow-up  [  ] LAST FIT/COLOGUARD RESULT [  ] LAST DEXA  [  ] LAST MAMMOGRAM  [  ] LAST PAP  [  ] LAST LABS [  ] RETINA EXAM REPORT  [  ] IMMUNIZATION RECORDS  [XXX] Release all info      [XXX] Check here and initial the line next to each item to release ALL health information INCLUDING  _____ Care and treatment for drug and / or alcohol abuse  _____ HIV testing, infection status, or AIDS  _____ Genetic Testing    DATES OF SERVICE OR TIME PERIOD TO BE DISCLOSED: _____________  I understand and acknowledge that:  * This Authorization may be revoked at any time by you in writing, except if your health information has already been used or disclosed.  * Your health information that will be used or disclosed as a result of you signing this authorization could be re-disclosed by the recipient. If this occurs, your re-disclosed health information may no longer be protected by State or Federal laws.  * You may refuse to sign this Authorization. Your refusal will not affect your ability to obtain treatment.  * This Authorization becomes effective upon signing and will  on (date) __________.      If no date is indicated, this Authorization will  one (1) year from the signature date.     Name: Tarah Britton  Signature: Date:   3/12/2024     PLEASE FAX REQUESTED RECORDS BACK TO: [phone]

## 2024-03-25 ENCOUNTER — TELEPHONE (OUTPATIENT)
Dept: MEDICAL GROUP | Facility: CLINIC | Age: 44
End: 2024-03-25
Payer: MEDICARE

## 2024-03-25 NOTE — TELEPHONE ENCOUNTER
Pt came in wanting to have DMV paperwork signed.   I let her know that she needed to have an appointment.   She does have one on 04 02 2024.

## 2024-04-02 ENCOUNTER — OFFICE VISIT (OUTPATIENT)
Dept: MEDICAL GROUP | Facility: CLINIC | Age: 44
End: 2024-04-02
Payer: MEDICARE

## 2024-04-02 VITALS
RESPIRATION RATE: 20 BRPM | DIASTOLIC BLOOD PRESSURE: 62 MMHG | SYSTOLIC BLOOD PRESSURE: 118 MMHG | TEMPERATURE: 97.7 F | WEIGHT: 293 LBS | HEIGHT: 64 IN | HEART RATE: 79 BPM | BODY MASS INDEX: 50.02 KG/M2 | OXYGEN SATURATION: 96 %

## 2024-04-02 DIAGNOSIS — N39.41 URGE INCONTINENCE: ICD-10-CM

## 2024-04-02 DIAGNOSIS — R05.3 CHRONIC COUGH: ICD-10-CM

## 2024-04-02 DIAGNOSIS — R06.02 SHORTNESS OF BREATH: ICD-10-CM

## 2024-04-02 DIAGNOSIS — J45.41 MODERATE PERSISTENT ASTHMA WITH ACUTE EXACERBATION: ICD-10-CM

## 2024-04-02 DIAGNOSIS — G47.33 OSA ON CPAP: ICD-10-CM

## 2024-04-02 PROCEDURE — 99214 OFFICE O/P EST MOD 30 MIN: CPT | Performed by: PHYSICIAN ASSISTANT

## 2024-04-02 PROCEDURE — 3078F DIAST BP <80 MM HG: CPT | Performed by: PHYSICIAN ASSISTANT

## 2024-04-02 PROCEDURE — 3074F SYST BP LT 130 MM HG: CPT | Performed by: PHYSICIAN ASSISTANT

## 2024-04-02 RX ORDER — BUDESONIDE AND FORMOTEROL FUMARATE DIHYDRATE 160; 4.5 UG/1; UG/1
2 AEROSOL RESPIRATORY (INHALATION) 2 TIMES DAILY
Qty: 3 EACH | Refills: 1 | Status: SHIPPED | OUTPATIENT
Start: 2024-04-02

## 2024-04-02 RX ORDER — OXYBUTYNIN CHLORIDE 10 MG/1
10 TABLET, EXTENDED RELEASE ORAL DAILY
Qty: 90 TABLET | Refills: 0 | Status: SHIPPED | OUTPATIENT
Start: 2024-04-02

## 2024-04-02 RX ORDER — ALBUTEROL SULFATE 2.5 MG/3ML
2.5 SOLUTION RESPIRATORY (INHALATION) EVERY 4 HOURS PRN
Qty: 300 ML | Refills: 2 | Status: SHIPPED | OUTPATIENT
Start: 2024-04-02

## 2024-04-02 ASSESSMENT — FIBROSIS 4 INDEX: FIB4 SCORE: 0.31

## 2024-04-02 NOTE — PROGRESS NOTES
cc: Asthma    Subjective:     Tarah Britton is a 43 y.o. female presenting for asthma   Patient presents to the office for asthma.  When patient was last seen, she was requesting oxygen as she has been in [facility].  We did receive notes.  On page 11 of [facility] records scanned into media, the physician indicates that the patient did ambulate around the department with an oxygen saturation in the 90s on her home O2 monitor.  She had not been hypoxic while in the hospital and at that time did not qualify for oxygen.  Oxygen levels with walking 96% in office today.  Previously patient had been referred to [facility] by her cardiologist for sleep apnea evaluation.   Patient states that she was also seen at [facility] in [place] which is another primary care provider.  She is using her albuterol 4 times a day.  She states that she had one episode of vomiting yesterday.      She states that the oxybutynin did help a little but not a lot.      Review of systems:  See above.   Denies any symptoms unless previously indicated.        Current Outpatient Medications:     oxybutynin SR (DITROPAN-XL) 10 MG CR tablet, Take 1 Tablet by mouth every day., Disp: 90 Tablet, Rfl: 0    NON SPECIFIED, nebulizer, Disp: 1 Each, Rfl: 1    albuterol (PROVENTIL) 2.5mg/3ml Nebu Soln solution for nebulization, Take 3 mL by nebulization every four hours as needed for Shortness of Breath., Disp: 300 mL, Rfl: 2    budesonide-formoterol (SYMBICORT) 160-4.5 MCG/ACT Aerosol, Inhale 2 Puffs 2 times a day., Disp: 3 Each, Rfl: 1    Continuous Blood Gluc Transmit (DEXCOM G6 TRANSMITTER) Misc, , Disp: , Rfl:     benzonatate (TESSALON) 100 MG Cap, Take 1 Capsule by mouth 3 times a day as needed for Cough., Disp: 60 Capsule, Rfl: 0    losartan (COZAAR) 25 MG Tab, TAKE 2 TABLETS BY MOUTH DAILY, Disp: 180 Tablet, Rfl: 1    glucose blood (ONETOUCH VERIO) strip, 1 Strip by Extracorporeal route., Disp: ,  Rfl:     Insulin Disposable Pump (OMNIPOD 5 G6 POD, GEN 5,) Misc, , Disp: , Rfl:     TOUJEO MAX SOLOSTAR 300 UNIT/ML Solution Pen-injector, Inject 30 Units under the skin every day., Disp: , Rfl:     MOUNJARO 5 MG/0.5ML Solution Pen-injector, , Disp: , Rfl:     gabapentin (NEURONTIN) 300 MG Cap, TAKE TWO CAPSULES BY MOUTH TWICE A DAY, Disp: 360 Capsule, Rfl: 0    levothyroxine (SYNTHROID) 100 MCG Tab, Take 1 Tablet by mouth every morning on an empty stomach. ON EMPTY STOMACH, Disp: 90 Tablet, Rfl: 0    atorvastatin (LIPITOR) 40 MG Tab, Take 1 Tablet by mouth every day., Disp: 100 Tablet, Rfl: 0    escitalopram (LEXAPRO) 5 MG tablet, TAKE ONE TABLET BY MOUTH DAILY, Disp: 30 Tablet, Rfl: 4    NOVOLOG 100 UNIT/ML Solution, , Disp: , Rfl:     insulin aspart (NOVOLOG) 100 UNIT/ML Solution, Inject 180 units under the skin once daily via insulin pump (max of 180 units per day) 90 days supply, Disp: , Rfl:     pioglitazone (ACTOS) 30 MG Tab, TAKE ONE TABLET BY MOUTH DAILY, Disp: 30 Tablet, Rfl: 0    metFORMIN ER (GLUCOPHAGE XR) 500 MG TABLET SR 24 HR, TAKE TWO TABLETS BY MOUTH TWICE A DAY (Patient taking differently: Take 1,000 mg by mouth 2 times a day. TAKE TWO TABLETS BY MOUTH TWICE A DAY), Disp: 60 Tablet, Rfl: 0    Empagliflozin (JARDIANCE) 25 MG Tab, Take 1 Tablet by mouth every day. TAKE ONE TABLET BY MOUTH DAILY, Disp: 30 Tablet, Rfl: 0    Continuous Blood Gluc Sensor (DEXCOM G6 SENSOR) Misc, 1 Each every 10 days., Disp: 9 Each, Rfl: 0    insulin lispro (HUMALOG,ADMELOG) 100 UNIT/ML Solution Pen-injector injection PEN, Inject 11 Units under the skin 3 times a day before meals., Disp: 5 Each, Rfl: 0    Insulin Pen Needle 32G X 4 MM Misc, 1 Each by Does not apply route 4 times a day., Disp: 120 Each, Rfl: 11    baclofen (LIORESAL) 20 MG tablet, TAKE ONE TABLET BY MOUTH TWICE A DAY (Patient not taking: Reported on 3/12/2024), Disp: 60 Tablet, Rfl: 0    Alcohol Swabs (PHARMACIST CHOICE ALCOHOL) Pads, , Disp: , Rfl:  "    Allergies, past medical history, past surgical history, family history, social history reviewed and updated    Objective:     Vitals: /62 (BP Location: Left arm, Patient Position: Sitting, BP Cuff Size: Adult)   Pulse 79   Temp 36.5 °C (97.7 °F) (Temporal)   Resp 20   Ht 1.626 m (5' 4\")   Wt (!) 185 kg (408 lb 4.7 oz)   LMP  (LMP Unknown)   SpO2 96%   BMI 70.08 kg/m²   General: Alert, pleasant, NAD  EYES:   PERRL, EOMI, no icterus or pallor.  Conjunctivae and lids normal.   HENT:  Normocephalic.  External ears normal.  Neck supple.     Heart: Regular rate and rhythm.  S1 and S2 normal.  No murmurs appreciated.  Respiratory: Normal respiratory effort.  Clear to auscultation bilaterally.  Coughing spasms in office.  Patient appears to be struggling to breathe with tripoding  Abdomen: obese  Skin: Warm, dry, no rashes.  Musculoskeletal: Gait is normal.  Moves all extremities well.    Neurological: No tremors, sensation grossly intact,  gait is normal, CN2-12 intact.  Psych:  Affect/mood is normal, judgement is good, memory is intact, grooming is appropriate.    Assessment/Plan:     Tarah matson Valdemar was seen today for follow-up.    Diagnoses and all orders for this visit:    Moderate persistent asthma with acute exacerbation  -     Referral to Pulmonary and Sleep Medicine  -     DX-CHEST-2 VIEWS; Future  -     NON SPECIFIED; nebulizer  -     albuterol (PROVENTIL) 2.5mg/3ml Nebu Soln solution for nebulization; Take 3 mL by nebulization every four hours as needed for Shortness of Breath.  -     budesonide-formoterol (SYMBICORT) 160-4.5 MCG/ACT Aerosol; Inhale 2 Puffs 2 times a day.    DESEAN on CPAP  -     Referral to Pulmonary and Sleep Medicine  -     DX-CHEST-2 VIEWS; Future  -     NON SPECIFIED; nebulizer  -     albuterol (PROVENTIL) 2.5mg/3ml Nebu Soln solution for nebulization; Take 3 mL by nebulization every four hours as needed for Shortness of Breath.  -     budesonide-formoterol (SYMBICORT) " 160-4.5 MCG/ACT Aerosol; Inhale 2 Puffs 2 times a day.    Chronic cough  -     Referral to Pulmonary and Sleep Medicine  -     DX-CHEST-2 VIEWS; Future  -     NON SPECIFIED; nebulizer  -     albuterol (PROVENTIL) 2.5mg/3ml Nebu Soln solution for nebulization; Take 3 mL by nebulization every four hours as needed for Shortness of Breath.  -     budesonide-formoterol (SYMBICORT) 160-4.5 MCG/ACT Aerosol; Inhale 2 Puffs 2 times a day.    Shortness of breath  -     Referral to Pulmonary and Sleep Medicine  -     DX-CHEST-2 VIEWS; Future  -     NON SPECIFIED; nebulizer  -     albuterol (PROVENTIL) 2.5mg/3ml Nebu Soln solution for nebulization; Take 3 mL by nebulization every four hours as needed for Shortness of Breath.  -     budesonide-formoterol (SYMBICORT) 160-4.5 MCG/ACT Aerosol; Inhale 2 Puffs 2 times a day.    Urge incontinence  -     oxybutynin SR (DITROPAN-XL) 10 MG CR tablet; Take 1 Tablet by mouth every day.      Patient is diabetic and still very hesitant to give a steroid.  We will stop the Advair and try patient on Symbicort 160/4.5.  Will obtain a repeat chest x-ray.  Will order nebulizer as patient no longer has a nebulizer and albuterol Nebules to go with this.  Will submit an urgent referral to pulmonary medicine for further evaluation.  Follow-up in 2 weeks.    For urge incontinence, we did increase the oxybutynin from 5 mg to 10 mg.      Return in about 2 weeks (around 4/16/2024), or if symptoms worsen or fail to improve.    Please note that this dictation was created using voice recognition software. I have made every reasonable attempt to correct obvious errors, but expect that there are errors of grammar and possible content that I did not discover before finalizing note.

## 2024-04-03 ENCOUNTER — APPOINTMENT (OUTPATIENT)
Dept: RADIOLOGY | Facility: IMAGING CENTER | Age: 44
End: 2024-04-03
Attending: PHYSICIAN ASSISTANT
Payer: MEDICARE

## 2024-04-03 ENCOUNTER — NON-PROVIDER VISIT (OUTPATIENT)
Dept: URGENT CARE | Facility: PHYSICIAN GROUP | Age: 44
End: 2024-04-03
Payer: MEDICARE

## 2024-04-03 DIAGNOSIS — J45.41 MODERATE PERSISTENT ASTHMA WITH ACUTE EXACERBATION: ICD-10-CM

## 2024-04-03 DIAGNOSIS — G47.33 OSA ON CPAP: ICD-10-CM

## 2024-04-03 DIAGNOSIS — R05.3 CHRONIC COUGH: ICD-10-CM

## 2024-04-03 DIAGNOSIS — R06.02 SHORTNESS OF BREATH: ICD-10-CM

## 2024-04-03 PROCEDURE — 71046 X-RAY EXAM CHEST 2 VIEWS: CPT | Mod: TC,FY | Performed by: RADIOLOGY

## 2024-04-09 DIAGNOSIS — F32.A DEPRESSION, UNSPECIFIED DEPRESSION TYPE: ICD-10-CM

## 2024-04-09 DIAGNOSIS — E78.2 MIXED HYPERLIPIDEMIA: ICD-10-CM

## 2024-04-09 DIAGNOSIS — G44.209 TENSION HEADACHE: ICD-10-CM

## 2024-04-09 DIAGNOSIS — E03.9 ACQUIRED HYPOTHYROIDISM: ICD-10-CM

## 2024-04-09 RX ORDER — BACLOFEN 20 MG/1
20 TABLET ORAL 2 TIMES DAILY
Qty: 60 TABLET | Refills: 1 | Status: SHIPPED | OUTPATIENT
Start: 2024-04-09

## 2024-04-09 RX ORDER — LEVOTHYROXINE SODIUM 0.1 MG/1
100 TABLET ORAL
Qty: 30 TABLET | Refills: 0 | Status: SHIPPED | OUTPATIENT
Start: 2024-04-09

## 2024-04-09 RX ORDER — ATORVASTATIN CALCIUM 40 MG/1
40 TABLET, FILM COATED ORAL DAILY
Qty: 100 TABLET | Refills: 2 | Status: SHIPPED | OUTPATIENT
Start: 2024-04-09

## 2024-04-09 RX ORDER — ESCITALOPRAM OXALATE 5 MG/1
5 TABLET ORAL DAILY
Qty: 30 TABLET | Refills: 3 | Status: SHIPPED | OUTPATIENT
Start: 2024-04-09

## 2024-04-09 NOTE — TELEPHONE ENCOUNTER
Requested Prescriptions     Pending Prescriptions Disp Refills    baclofen (LIORESAL) 20 MG tablet 60 Tablet 0     Sig: Take 1 Tablet by mouth 2 times a day.    escitalopram (LEXAPRO) 5 MG tablet 30 Tablet 4     Sig: Take 1 Tablet by mouth every day.    levothyroxine (SYNTHROID) 100 MCG Tab 90 Tablet 0     Sig: Take 1 Tablet by mouth every morning on an empty stomach. ON EMPTY STOMACH    atorvastatin (LIPITOR) 40 MG Tab 100 Tablet 0     Sig: Take 1 Tablet by mouth every day.      Last office visit: 4/2/24  Last lab: 1/2/24

## 2024-04-15 ENCOUNTER — OFFICE VISIT (OUTPATIENT)
Dept: URGENT CARE | Facility: PHYSICIAN GROUP | Age: 44
End: 2024-04-15
Payer: MEDICARE

## 2024-04-15 VITALS
SYSTOLIC BLOOD PRESSURE: 128 MMHG | HEIGHT: 64 IN | BODY MASS INDEX: 50.02 KG/M2 | HEART RATE: 83 BPM | WEIGHT: 293 LBS | OXYGEN SATURATION: 94 % | RESPIRATION RATE: 18 BRPM | DIASTOLIC BLOOD PRESSURE: 78 MMHG | TEMPERATURE: 96.9 F

## 2024-04-15 DIAGNOSIS — J01.90 ACUTE BACTERIAL SINUSITIS: ICD-10-CM

## 2024-04-15 DIAGNOSIS — H10.33 ACUTE BACTERIAL CONJUNCTIVITIS OF BOTH EYES: ICD-10-CM

## 2024-04-15 DIAGNOSIS — B96.89 ACUTE BACTERIAL SINUSITIS: ICD-10-CM

## 2024-04-15 DIAGNOSIS — R05.9 COUGH, UNSPECIFIED TYPE: ICD-10-CM

## 2024-04-15 PROCEDURE — 3078F DIAST BP <80 MM HG: CPT | Performed by: NURSE PRACTITIONER

## 2024-04-15 PROCEDURE — 99214 OFFICE O/P EST MOD 30 MIN: CPT | Performed by: NURSE PRACTITIONER

## 2024-04-15 PROCEDURE — 3074F SYST BP LT 130 MM HG: CPT | Performed by: NURSE PRACTITIONER

## 2024-04-15 RX ORDER — AMOXICILLIN AND CLAVULANATE POTASSIUM 875; 125 MG/1; MG/1
1 TABLET, FILM COATED ORAL 2 TIMES DAILY
Qty: 14 TABLET | Refills: 0 | Status: SHIPPED | OUTPATIENT
Start: 2024-04-15 | End: 2024-04-22

## 2024-04-15 RX ORDER — FLUTICASONE PROPIONATE 50 MCG
1 SPRAY, SUSPENSION (ML) NASAL DAILY
Qty: 16 G | Refills: 0 | Status: SHIPPED | OUTPATIENT
Start: 2024-04-15

## 2024-04-15 RX ORDER — MOXIFLOXACIN 5 MG/ML
1 SOLUTION/ DROPS OPHTHALMIC 3 TIMES DAILY
Qty: 3 ML | Refills: 0 | Status: SHIPPED | OUTPATIENT
Start: 2024-04-15 | End: 2024-04-22

## 2024-04-15 ASSESSMENT — ENCOUNTER SYMPTOMS
PHOTOPHOBIA: 0
COUGH: 1
NAUSEA: 0
SHORTNESS OF BREATH: 0
BLURRED VISION: 0
SORE THROAT: 0
ORTHOPNEA: 0
EYE DISCHARGE: 1
DIARRHEA: 0
MYALGIAS: 0
CHILLS: 0
EYE REDNESS: 1
SPUTUM PRODUCTION: 1
DOUBLE VISION: 0
HEADACHES: 0
WHEEZING: 0
EYE PAIN: 0
FEVER: 0

## 2024-04-15 ASSESSMENT — FIBROSIS 4 INDEX: FIB4 SCORE: 0.31

## 2024-04-15 NOTE — PROGRESS NOTES
Subjective     Tarah grantBrent Britton is a 43 y.o. female who presents with Conjunctivitis (X3 days B eye redness and discharge, )            HPI  New problem.  Patient is a 43-year-old female who presents with a 2-week history of nasal congestion, 4-month history of cough, and 3-day history of bilateral eye discharge and redness.  She denies fever, chills, blurred vision, double vision, eye pain, or itchiness.  She has purulent discharge bilaterally.  She has been followed up on her cough by both her primary care and pulmonology.  She does report history of asthma.    Vitamin d  Current Outpatient Medications on File Prior to Visit   Medication Sig Dispense Refill    baclofen (LIORESAL) 20 MG tablet Take 1 Tablet by mouth 2 times a day. 60 Tablet 1    escitalopram (LEXAPRO) 5 MG tablet Take 1 Tablet by mouth every day. 30 Tablet 3    levothyroxine (SYNTHROID) 100 MCG Tab Take 1 Tablet by mouth every morning on an empty stomach. ON EMPTY STOMACH 30 Tablet 0    atorvastatin (LIPITOR) 40 MG Tab Take 1 Tablet by mouth every day. 100 Tablet 2    oxybutynin SR (DITROPAN-XL) 10 MG CR tablet Take 1 Tablet by mouth every day. 90 Tablet 0    NON SPECIFIED nebulizer 1 Each 1    albuterol (PROVENTIL) 2.5mg/3ml Nebu Soln solution for nebulization Take 3 mL by nebulization every four hours as needed for Shortness of Breath. 300 mL 2    budesonide-formoterol (SYMBICORT) 160-4.5 MCG/ACT Aerosol Inhale 2 Puffs 2 times a day. 3 Each 1    Continuous Blood Gluc Transmit (DEXCOM G6 TRANSMITTER) Misc       benzonatate (TESSALON) 100 MG Cap Take 1 Capsule by mouth 3 times a day as needed for Cough. 60 Capsule 0    losartan (COZAAR) 25 MG Tab TAKE 2 TABLETS BY MOUTH DAILY 180 Tablet 1    Insulin Disposable Pump (OMNIPOD 5 G6 POD, GEN 5,) Misc       TOUJEO MAX SOLOSTAR 300 UNIT/ML Solution Pen-injector Inject 30 Units under the skin every day.      MOUNJARO 5 MG/0.5ML Solution Pen-injector       gabapentin (NEURONTIN) 300 MG Cap  TAKE TWO CAPSULES BY MOUTH TWICE A  Capsule 0    NOVOLOG 100 UNIT/ML Solution       insulin aspart (NOVOLOG) 100 UNIT/ML Solution Inject 180 units under the skin once daily via insulin pump (max of 180 units per day) 90 days supply      pioglitazone (ACTOS) 30 MG Tab TAKE ONE TABLET BY MOUTH DAILY 30 Tablet 0    metFORMIN ER (GLUCOPHAGE XR) 500 MG TABLET SR 24 HR TAKE TWO TABLETS BY MOUTH TWICE A DAY (Patient taking differently: Take 1,000 mg by mouth 2 times a day. TAKE TWO TABLETS BY MOUTH TWICE A DAY) 60 Tablet 0    Empagliflozin (JARDIANCE) 25 MG Tab Take 1 Tablet by mouth every day. TAKE ONE TABLET BY MOUTH DAILY 30 Tablet 0    Continuous Blood Gluc Sensor (DEXCOM G6 SENSOR) Misc 1 Each every 10 days. 9 Each 0    insulin lispro (HUMALOG,ADMELOG) 100 UNIT/ML Solution Pen-injector injection PEN Inject 11 Units under the skin 3 times a day before meals. 5 Each 0    Insulin Pen Needle 32G X 4 MM Misc 1 Each by Does not apply route 4 times a day. 120 Each 11    glucose blood (ONETOUCH VERIO) strip 1 Strip by Extracorporeal route.      Alcohol Swabs (PHARMACIST CHOICE ALCOHOL) Pads  (Patient not taking: Reported on 3/12/2024)       No current facility-administered medications on file prior to visit.     Social History     Socioeconomic History    Marital status: Single     Spouse name: Not on file    Number of children: Not on file    Years of education: Not on file    Highest education level: 12th grade   Occupational History    Not on file   Tobacco Use    Smoking status: Never    Smokeless tobacco: Former     Types: Chew     Quit date: 6/11/2018   Vaping Use    Vaping Use: Never used   Substance and Sexual Activity    Alcohol use: No    Drug use: No    Sexual activity: Yes     Partners: Male   Other Topics Concern    Not on file   Social History Narrative    ** Merged History Encounter **          Social Determinants of Health     Financial Resource Strain: Low Risk  (5/20/2020)    Overall Financial  Resource Strain (CARDIA)     Difficulty of Paying Living Expenses: Not hard at all   Food Insecurity: No Food Insecurity (5/20/2020)    Hunger Vital Sign     Worried About Running Out of Food in the Last Year: Never true     Ran Out of Food in the Last Year: Never true   Transportation Needs: Unmet Transportation Needs (5/20/2020)    PRAPARE - Transportation     Lack of Transportation (Medical): Yes     Lack of Transportation (Non-Medical): No   Physical Activity: Insufficiently Active (5/20/2020)    Exercise Vital Sign     Days of Exercise per Week: 2 days     Minutes of Exercise per Session: 10 min   Stress: No Stress Concern Present (5/20/2020)    [employer] - Occupational Stress Questionnaire     Feeling of Stress : Only a little   Social Connections: Moderately Integrated (5/20/2020)    Social Connection and Isolation Panel [NHANES]     Frequency of Communication with Friends and Family: More than three times a week     Frequency of Social Gatherings with Friends and Family: More than three times a week     Attends Faith Services: 1 to 4 times per year     Active Member of Clubs or Organizations: No     Attends Club or Organization Meetings: Never     Marital Status: Living with partner   Intimate Partner Violence: Not on file   Housing Stability: Low Risk  (5/20/2020)    Housing Stability Vital Sign     Unable to Pay for Housing in the Last Year: No     Number of Places Lived in the Last Year: 1     Unstable Housing in the Last Year: No     Breast Cancer-related family history is not on file.      Review of Systems   Constitutional:  Positive for malaise/fatigue. Negative for chills and fever.   HENT:  Positive for congestion. Negative for sore throat.    Eyes:  Positive for discharge and redness. Negative for blurred vision, double vision, photophobia and pain.   Respiratory:  Positive for cough and sputum production. Negative for shortness of breath and wheezing.   "  Cardiovascular:  Negative for chest pain and orthopnea.   Gastrointestinal:  Negative for diarrhea and nausea.   Musculoskeletal:  Negative for myalgias.   Neurological:  Negative for headaches.   Endo/Heme/Allergies:  Negative for environmental allergies.              Objective     /78   Pulse 83   Temp 36.1 °C (96.9 °F) (Temporal)   Resp 18   Ht 1.626 m (5' 4\")   Wt (!) 185 kg (407 lb)   LMP  (LMP Unknown)   SpO2 94%   BMI 69.86 kg/m²      Physical Exam  Constitutional:       General: She is not in acute distress.     Appearance: Normal appearance. She is well-developed.   HENT:      Head: Normocephalic and atraumatic.      Right Ear: Tympanic membrane, ear canal and external ear normal. No middle ear effusion. Tympanic membrane is not injected or perforated.      Left Ear: Tympanic membrane, ear canal and external ear normal.  No middle ear effusion. Tympanic membrane is not injected or perforated.      Nose: Mucosal edema and congestion present.      Mouth/Throat:      Pharynx: No oropharyngeal exudate or posterior oropharyngeal erythema.   Eyes:      General:         Right eye: Discharge present.         Left eye: Discharge present.     Conjunctiva/sclera:      Right eye: Right conjunctiva is injected.      Left eye: Left conjunctiva is injected.   Cardiovascular:      Rate and Rhythm: Normal rate and regular rhythm.      Heart sounds: Normal heart sounds. No murmur heard.  Pulmonary:      Effort: Pulmonary effort is normal. No respiratory distress.      Breath sounds: Normal breath sounds.   Musculoskeletal:         General: Normal range of motion.      Cervical back: Normal range of motion and neck supple.      Comments: Normal movement of all 4 extremities.   Lymphadenopathy:      Cervical: No cervical adenopathy.      Upper Body:      Right upper body: No supraclavicular adenopathy.      Left upper body: No supraclavicular adenopathy.   Skin:     General: Skin is warm and dry. "   Neurological:      Mental Status: She is alert and oriented to person, place, and time.      Gait: Gait normal.   Psychiatric:         Behavior: Behavior normal.         Thought Content: Thought content normal.                             Assessment & Plan        1. Acute bacterial sinusitis  amoxicillin-clavulanate (AUGMENTIN) 875-125 MG Tab    fluticasone (FLONASE) 50 MCG/ACT nasal spray      2. Cough, unspecified type        3. Acute bacterial conjunctivitis of both eyes  moxifloxacin (VIGAMOX) 0.5 % Solution        Differential diagnosis, natural history, supportive care, and indications for immediate follow-up were discussed.

## 2024-04-29 DIAGNOSIS — E11.42 DIABETIC POLYNEUROPATHY ASSOCIATED WITH TYPE 2 DIABETES MELLITUS (HCC): ICD-10-CM

## 2024-04-30 RX ORDER — GABAPENTIN 300 MG/1
CAPSULE ORAL
Qty: 360 CAPSULE | Refills: 1 | Status: SHIPPED | OUTPATIENT
Start: 2024-04-30

## 2024-04-30 NOTE — TELEPHONE ENCOUNTER
Received request via: Patient    Was the patient seen in the last year in this department? Yes    Does the patient have an active prescription (recently filled or refills available) for medication(s) requested?  Yes    Pharmacy Name: [facility] in [place]    Does the patient have intermediate Plus and need 100 day supply (blood pressure, diabetes and cholesterol meds only)? Patient does not have SCP

## 2024-05-08 ENCOUNTER — APPOINTMENT (OUTPATIENT)
Dept: MEDICAL GROUP | Facility: CLINIC | Age: 44
End: 2024-05-08
Payer: MEDICARE

## 2024-05-08 ENCOUNTER — APPOINTMENT (OUTPATIENT)
Dept: LAB | Facility: MEDICAL CENTER | Age: 44
End: 2024-05-08
Attending: PHYSICIAN ASSISTANT
Payer: MEDICARE

## 2024-05-08 VITALS
SYSTOLIC BLOOD PRESSURE: 118 MMHG | TEMPERATURE: 97.5 F | DIASTOLIC BLOOD PRESSURE: 62 MMHG | WEIGHT: 293 LBS | HEIGHT: 64 IN | HEART RATE: 90 BPM | BODY MASS INDEX: 50.02 KG/M2 | RESPIRATION RATE: 18 BRPM | OXYGEN SATURATION: 97 %

## 2024-05-08 DIAGNOSIS — G47.09 OTHER INSOMNIA: ICD-10-CM

## 2024-05-08 DIAGNOSIS — J45.41 MODERATE PERSISTENT ASTHMA WITH ACUTE EXACERBATION: ICD-10-CM

## 2024-05-08 DIAGNOSIS — Z79.4 TYPE 2 DIABETES MELLITUS WITH DIABETIC NEUROPATHY, WITH LONG-TERM CURRENT USE OF INSULIN (HCC): ICD-10-CM

## 2024-05-08 DIAGNOSIS — N32.81 OVERACTIVE BLADDER: ICD-10-CM

## 2024-05-08 DIAGNOSIS — E11.40 TYPE 2 DIABETES MELLITUS WITH DIABETIC NEUROPATHY, WITH LONG-TERM CURRENT USE OF INSULIN (HCC): ICD-10-CM

## 2024-05-08 PROCEDURE — 92250 FUNDUS PHOTOGRAPHY W/I&R: CPT | Mod: 26 | Performed by: PHYSICIAN ASSISTANT

## 2024-05-08 PROCEDURE — 3078F DIAST BP <80 MM HG: CPT | Performed by: PHYSICIAN ASSISTANT

## 2024-05-08 PROCEDURE — 99214 OFFICE O/P EST MOD 30 MIN: CPT | Performed by: PHYSICIAN ASSISTANT

## 2024-05-08 PROCEDURE — 3074F SYST BP LT 130 MM HG: CPT | Performed by: PHYSICIAN ASSISTANT

## 2024-05-08 RX ORDER — OXYBUTYNIN CHLORIDE 15 MG/1
15 TABLET, EXTENDED RELEASE ORAL DAILY
Qty: 90 TABLET | Refills: 0 | Status: SHIPPED | OUTPATIENT
Start: 2024-05-08

## 2024-05-08 RX ORDER — PROCHLORPERAZINE 25 MG/1
1 SUPPOSITORY RECTAL
Qty: 9 EACH | Refills: 3 | Status: SHIPPED
Start: 2024-05-08

## 2024-05-08 RX ORDER — TRAZODONE HYDROCHLORIDE 50 MG/1
50 TABLET ORAL NIGHTLY
Qty: 30 TABLET | Refills: 3 | Status: SHIPPED | OUTPATIENT
Start: 2024-05-08

## 2024-05-08 ASSESSMENT — FIBROSIS 4 INDEX: FIB4 SCORE: 0.31

## 2024-05-08 NOTE — PROGRESS NOTES
cc:  cough    Subjective:     Tarah Britton is a 43 y.o. female presenting for cough      Patient presents to the office for cough.  At last visit, patient was started on Symbicort.  We also ordered a chest x-ray, a nebulizer and a pulmonary referral for patient.  She is here to follow-up.  Patient does have a refill of symbicort.  She did see the lung specialist and was told oxygen is not needed.  She is also requesting paperwork be filled out for the DMV handicap placard.    Ditropan was also increased from 5 mg to 10 mg.  The increased dose did help but she is still having incontinence.      Patient states that she is needing refills of her sensors for her continuous glucometer for diabetes.  She does see endocrinology.    Patient states that she has been having difficulty sleeping.  She does use a bipap machine.  In the past she has used Ambien.    Review of systems:  See above.   Denies any symptoms unless previously indicated.        Current Outpatient Medications:     Continuous Glucose Sensor (DEXCOM G6 SENSOR) Misc, 1 Each every 10 days., Disp: 9 Each, Rfl: 3    oxybutynin (DITROPAN-XL) 15 MG CR tablet, Take 1 Tablet by mouth every day., Disp: 90 Tablet, Rfl: 0    traZODone (DESYREL) 50 MG Tab, Take 1 Tablet by mouth every evening., Disp: 30 Tablet, Rfl: 3    gabapentin (NEURONTIN) 300 MG Cap, TAKE TWO CAPSULES BY MOUTH TWICE A DAY, Disp: 360 Capsule, Rfl: 1    fluticasone (FLONASE) 50 MCG/ACT nasal spray, Administer 1 Spray into affected nostril(S) every day., Disp: 16 g, Rfl: 0    baclofen (LIORESAL) 20 MG tablet, Take 1 Tablet by mouth 2 times a day., Disp: 60 Tablet, Rfl: 1    escitalopram (LEXAPRO) 5 MG tablet, Take 1 Tablet by mouth every day., Disp: 30 Tablet, Rfl: 3    levothyroxine (SYNTHROID) 100 MCG Tab, Take 1 Tablet by mouth every morning on an empty stomach. ON EMPTY STOMACH, Disp: 30 Tablet, Rfl: 0    atorvastatin (LIPITOR) 40 MG Tab, Take 1 Tablet by mouth every day., Disp: 100 Tablet, Rfl:  2    NON SPECIFIED, nebulizer, Disp: 1 Each, Rfl: 1    albuterol (PROVENTIL) 2.5mg/3ml Nebu Soln solution for nebulization, Take 3 mL by nebulization every four hours as needed for Shortness of Breath., Disp: 300 mL, Rfl: 2    budesonide-formoterol (SYMBICORT) 160-4.5 MCG/ACT Aerosol, Inhale 2 Puffs 2 times a day., Disp: 3 Each, Rfl: 1    Continuous Blood Gluc Transmit (DEXCOM G6 TRANSMITTER) Misc, , Disp: , Rfl:     benzonatate (TESSALON) 100 MG Cap, Take 1 Capsule by mouth 3 times a day as needed for Cough., Disp: 60 Capsule, Rfl: 0    losartan (COZAAR) 25 MG Tab, TAKE 2 TABLETS BY MOUTH DAILY, Disp: 180 Tablet, Rfl: 1    Insulin Disposable Pump (OMNIPOD 5 G6 POD, GEN 5,) Misc, , Disp: , Rfl:     TOUJEO MAX SOLOSTAR 300 UNIT/ML Solution Pen-injector, Inject 30 Units under the skin every day., Disp: , Rfl:     MOUNJARO 5 MG/0.5ML Solution Pen-injector, , Disp: , Rfl:     NOVOLOG 100 UNIT/ML Solution, , Disp: , Rfl:     insulin aspart (NOVOLOG) 100 UNIT/ML Solution, Inject 180 units under the skin once daily via insulin pump (max of 180 units per day) 90 days supply, Disp: , Rfl:     pioglitazone (ACTOS) 30 MG Tab, TAKE ONE TABLET BY MOUTH DAILY, Disp: 30 Tablet, Rfl: 0    metFORMIN ER (GLUCOPHAGE XR) 500 MG TABLET SR 24 HR, TAKE TWO TABLETS BY MOUTH TWICE A DAY (Patient taking differently: Take 1,000 mg by mouth 2 times a day. TAKE TWO TABLETS BY MOUTH TWICE A DAY), Disp: 60 Tablet, Rfl: 0    Empagliflozin (JARDIANCE) 25 MG Tab, Take 1 Tablet by mouth every day. TAKE ONE TABLET BY MOUTH DAILY, Disp: 30 Tablet, Rfl: 0    insulin lispro (HUMALOG,ADMELOG) 100 UNIT/ML Solution Pen-injector injection PEN, Inject 11 Units under the skin 3 times a day before meals., Disp: 5 Each, Rfl: 0    Insulin Pen Needle 32G X 4 MM Misc, 1 Each by Does not apply route 4 times a day., Disp: 120 Each, Rfl: 11    Alcohol Swabs (PHARMACIST CHOICE ALCOHOL) Pads, , Disp: , Rfl:     Allergies, past medical history, past surgical history,  "family history, social history reviewed and updated    Objective:     Vitals: /62 (BP Location: Left arm, Patient Position: Sitting, BP Cuff Size: Adult)   Pulse 90   Temp 36.4 °C (97.5 °F) (Temporal)   Resp 18   Ht 1.626 m (5' 4\")   Wt (!) 177 kg (390 lb 14 oz)   LMP  (LMP Unknown)   SpO2 97%   BMI 67.09 kg/m²   General: Alert, pleasant, NAD  EYES:   PERRL, EOMI, no icterus or pallor.  Conjunctivae and lids normal.   HENT:  Normocephalic.  External ears normal.   Neck supple.    Respiratory: Normal respiratory effort.    Abdomen: obese  Skin: Warm, dry, no rashes.  Musculoskeletal: Gait is normal.  Moves all extremities well.    Extremities: Normal range of motion all extremities.   Neurological: No tremors, sensation grossly intact,  CN2-12 intact.  Psych:  Affect/mood is normal, judgement is good, memory is intact, grooming is appropriate.    DX-CHEST-2 VIEWS  Order: [id number]   Status: Final result       Visible to patient: Yes (seen)       Next appt: None       Dx: Shortness of breath; Chronic cough; O...    1 Result Note       1 Patient Communication  Details    Reading Physician Reading Date Result Priority   Ezio López M.D.  [phone] 4/3/2024      Narrative & Impression     4/3/2024 1:49 PM     HISTORY/REASON FOR EXAM:  Shortness of Breath.  It is related to interval clicks exam     TECHNIQUE/EXAM DESCRIPTION AND NUMBER OF VIEWS:  Two views of the chest.     COMPARISON:  1 view chest 2/15/2024     FINDINGS:  LUNGS: The lungs are clear.     HEART and MEDIASTINUM: enlarged.     Pleura: There are no pleural effusion or pneumothoraces.     Osseous structures: No significant bony abnormality.        IMPRESSION:     No acute cardiopulmonary abnormality identified.           Exam Ended: 04/03/24  1:58 PM             Assessment/Plan:     Tarah matson petronaTobi was seen today for follow-up.    Diagnoses and all orders for this visit:    Moderate persistent asthma with acute " exacerbation    Improved.  Continue with Symbicort inhaler.  Follow-up approximately October. DMV paperwork filled out.    Overactive bladder  -     oxybutynin (DITROPAN-XL) 15 MG CR tablet; Take 1 Tablet by mouth every day.    Improved but not controlled.  We will increase the oxybutynin from 10 mg to 15 mg.      Type 2 diabetes mellitus with diabetic neuropathy, with long-term current use of insulin ([facility])  -     POCT Retinal Eye Exam  -     Continuous Glucose Sensor (DEXCOM G6 SENSOR) Misc; 1 Each every 10 days.    Refills of sensors to be submitted.    Other insomnia  -     traZODone (DESYREL) 50 MG Tab; Take 1 Tablet by mouth every evening.      Will start patient on trazodone.  Patient is on a BiPAP machine.  Advised of risk of medication and sleep apnea.  Follow-up if no improvement in symptoms.      Return in about 4 months (around 9/8/2024) for October, November for asthma.  .    Please note that this dictation was created using voice recognition software. I have made every reasonable attempt to correct obvious errors, but expect that there are errors of grammar and possible content that I did not discover before finalizing note.

## 2024-05-14 ENCOUNTER — TELEPHONE (OUTPATIENT)
Dept: MEDICAL GROUP | Facility: CLINIC | Age: 44
End: 2024-05-14
Payer: MEDICARE

## 2024-05-14 DIAGNOSIS — E11.3212 DIABETIC MACULOPATHY OF LEFT EYE WITH MILD NONPROLIFERATIVE RETINOPATHY AND MACULAR EDEMA DETERMINED BY EXAMINATION ASSOCIATED WITH TYPE 2 DIABETES MELLITUS (HCC): ICD-10-CM

## 2024-05-14 DIAGNOSIS — E11.3211 MILD NONPROLIFERATIVE DIABETIC RETINOPATHY OF RIGHT EYE WITH MACULAR EDEMA ASSOCIATED WITH TYPE 2 DIABETES MELLITUS (HCC): ICD-10-CM

## 2024-06-09 DIAGNOSIS — G47.09 OTHER INSOMNIA: ICD-10-CM

## 2024-06-10 RX ORDER — TRAZODONE HYDROCHLORIDE 50 MG/1
50 TABLET ORAL NIGHTLY
Qty: 30 TABLET | Refills: 3 | Status: SHIPPED | OUTPATIENT
Start: 2024-06-10

## 2024-06-10 NOTE — TELEPHONE ENCOUNTER
Received request via: Patient    Was the patient seen in the last year in this department? Yes    Does the patient have an active prescription (recently filled or refills available) for medication(s) requested?  Yes    Pharmacy Name: [facility] in [place]    Does the patient have [facility] Plus and need 100 day supply (blood pressure, diabetes and cholesterol meds only)? Patient does not have SCP

## 2024-06-24 ENCOUNTER — PATIENT MESSAGE (OUTPATIENT)
Dept: MEDICAL GROUP | Facility: CLINIC | Age: 44
End: 2024-06-24
Payer: MEDICARE

## 2024-07-01 DIAGNOSIS — E11.42 DIABETIC POLYNEUROPATHY ASSOCIATED WITH TYPE 2 DIABETES MELLITUS (HCC): ICD-10-CM

## 2024-07-02 RX ORDER — INSULIN GLARGINE 300 U/ML
30 INJECTION, SOLUTION SUBCUTANEOUS DAILY
Qty: 3 ML | Refills: 0 | OUTPATIENT
Start: 2024-07-02

## 2024-07-02 RX ORDER — GABAPENTIN 300 MG/1
CAPSULE ORAL
Qty: 120 CAPSULE | Refills: 0 | Status: SHIPPED | OUTPATIENT
Start: 2024-07-02

## 2024-07-08 RX ORDER — INSULIN GLARGINE 300 U/ML
30 INJECTION, SOLUTION SUBCUTANEOUS DAILY
Qty: 1 ML | Refills: 0
Start: 2024-07-08

## 2024-07-11 ENCOUNTER — OFFICE VISIT (OUTPATIENT)
Dept: MEDICAL GROUP | Facility: CLINIC | Age: 44
End: 2024-07-11
Payer: MEDICARE

## 2024-07-11 VITALS
WEIGHT: 293 LBS | RESPIRATION RATE: 18 BRPM | BODY MASS INDEX: 50.02 KG/M2 | SYSTOLIC BLOOD PRESSURE: 118 MMHG | HEART RATE: 85 BPM | HEIGHT: 64 IN | TEMPERATURE: 98.5 F | OXYGEN SATURATION: 95 % | DIASTOLIC BLOOD PRESSURE: 72 MMHG

## 2024-07-11 DIAGNOSIS — E11.42 DIABETIC POLYNEUROPATHY ASSOCIATED WITH TYPE 2 DIABETES MELLITUS (HCC): ICD-10-CM

## 2024-07-11 DIAGNOSIS — R60.0 PERIPHERAL EDEMA: ICD-10-CM

## 2024-07-11 DIAGNOSIS — E66.01 MORBID OBESITY WITH BODY MASS INDEX (BMI) OF 60.0 TO 69.9 IN ADULT (HCC): ICD-10-CM

## 2024-07-11 DIAGNOSIS — Z12.12 SCREENING FOR COLORECTAL CANCER: ICD-10-CM

## 2024-07-11 DIAGNOSIS — E11.3311 MODERATE NONPROLIFERATIVE DIABETIC RETINOPATHY OF RIGHT EYE WITH MACULAR EDEMA ASSOCIATED WITH TYPE 2 DIABETES MELLITUS (HCC): ICD-10-CM

## 2024-07-11 DIAGNOSIS — Z12.11 SCREENING FOR COLORECTAL CANCER: ICD-10-CM

## 2024-07-11 PROCEDURE — 3074F SYST BP LT 130 MM HG: CPT | Performed by: PHYSICIAN ASSISTANT

## 2024-07-11 PROCEDURE — 3078F DIAST BP <80 MM HG: CPT | Performed by: PHYSICIAN ASSISTANT

## 2024-07-11 PROCEDURE — 99213 OFFICE O/P EST LOW 20 MIN: CPT | Performed by: PHYSICIAN ASSISTANT

## 2024-07-11 RX ORDER — HYDROCHLOROTHIAZIDE 25 MG/1
25 TABLET ORAL DAILY
Qty: 90 TABLET | Refills: 0 | Status: SHIPPED | OUTPATIENT
Start: 2024-07-11

## 2024-07-11 ASSESSMENT — FIBROSIS 4 INDEX: FIB4 SCORE: 0.31

## 2024-08-01 DIAGNOSIS — N32.81 OVERACTIVE BLADDER: ICD-10-CM

## 2024-08-01 DIAGNOSIS — F32.A DEPRESSION, UNSPECIFIED DEPRESSION TYPE: ICD-10-CM

## 2024-08-02 NOTE — TELEPHONE ENCOUNTER
Received request via: Patient    Was the patient seen in the last year in this department? Yes    Does the patient have an active prescription (recently filled or refills available) for medication(s) requested?  Yes    Pharmacy Name: [facility] in [place]    Does the patient have skilled nursing Plus and need 100 day supply (blood pressure, diabetes and cholesterol meds only)? Patient does not have SCP

## 2024-08-03 DIAGNOSIS — G47.09 OTHER INSOMNIA: ICD-10-CM

## 2024-08-03 DIAGNOSIS — I10 ESSENTIAL HYPERTENSION: ICD-10-CM

## 2024-08-03 DIAGNOSIS — F32.A DEPRESSION, UNSPECIFIED DEPRESSION TYPE: ICD-10-CM

## 2024-08-05 RX ORDER — TRAZODONE HYDROCHLORIDE 50 MG/1
50 TABLET, FILM COATED ORAL NIGHTLY
Qty: 90 TABLET | Refills: 1 | Status: SHIPPED | OUTPATIENT
Start: 2024-08-05

## 2024-08-05 RX ORDER — OXYBUTYNIN CHLORIDE 15 MG/1
15 TABLET, EXTENDED RELEASE ORAL
Qty: 90 TABLET | Refills: 1 | Status: SHIPPED | OUTPATIENT
Start: 2024-08-05

## 2024-08-05 RX ORDER — LOSARTAN POTASSIUM 25 MG/1
50 TABLET ORAL
Qty: 200 TABLET | Refills: 1 | Status: SHIPPED | OUTPATIENT
Start: 2024-08-05

## 2024-08-05 RX ORDER — ESCITALOPRAM OXALATE 5 MG/1
5 TABLET ORAL
Qty: 30 TABLET | Refills: 3 | OUTPATIENT
Start: 2024-08-05

## 2024-08-05 RX ORDER — ESCITALOPRAM OXALATE 5 MG/1
5 TABLET ORAL DAILY
Qty: 90 TABLET | Refills: 1 | Status: SHIPPED | OUTPATIENT
Start: 2024-08-05

## 2024-08-05 NOTE — TELEPHONE ENCOUNTER
Received request via: Patient    Was the patient seen in the last year in this department? Yes    Does the patient have an active prescription (recently filled or refills available) for medication(s) requested?  Yes    Pharmacy Name: [facility] in [place]    Does the patient have longterm Plus and need 100 day supply (blood pressure, diabetes and cholesterol meds only)? Patient does not have SCP

## 2024-08-05 NOTE — TELEPHONE ENCOUNTER
Requested Prescriptions     Pending Prescriptions Disp Refills    escitalopram (LEXAPRO) 5 MG tablet 30 Tablet 3     Sig: Take 1 Tablet by mouth every day.      Last office visit: 7/11/24  Last lab: 1/2/24

## 2024-08-05 NOTE — TELEPHONE ENCOUNTER
Was the patient seen in the last year in this department? Yes    Does patient have an active prescription for medications requested? Yes    Received Request Via: Patient    Hospital Outpatient Visit on 01/02/2024   Component Date Value    TSH 01/02/2024 8.520 (H)     25-Hydroxy   Vitamin D 25 01/02/2024 10 (L)     Sodium 01/02/2024 139     Potassium 01/02/2024 4.5     Chloride 01/02/2024 105     Co2 01/02/2024 22     Anion Gap 01/02/2024 12.0     Glucose 01/02/2024 132 (H)     Bun 01/02/2024 30 (H)     Creatinine 01/02/2024 0.82     Calcium 01/02/2024 9.0     Correct Calcium 01/02/2024 9.3     AST(SGOT) 01/02/2024 15     ALT(SGPT) 01/02/2024 29     Alkaline Phosphatase 01/02/2024 119 (H)     Total Bilirubin 01/02/2024 0.4     Albumin 01/02/2024 3.6     Total Protein 01/02/2024 7.2     Globulin 01/02/2024 3.6 (H)     A-G Ratio 01/02/2024 1.0     Cholesterol,Tot 01/02/2024 140     Triglycerides 01/02/2024 147     HDL 01/02/2024 45     LDL 01/02/2024 66     GFR (CKD-EPI) 01/02/2024 90     Free T-4 01/02/2024 1.41    ]    Last office visit: 7/11/24  Last lab: 1/2/24

## 2024-10-12 DIAGNOSIS — E11.42 DIABETIC POLYNEUROPATHY ASSOCIATED WITH TYPE 2 DIABETES MELLITUS (HCC): ICD-10-CM

## 2024-10-12 DIAGNOSIS — J45.41 MODERATE PERSISTENT ASTHMA WITH ACUTE EXACERBATION: ICD-10-CM

## 2024-10-12 DIAGNOSIS — R06.02 SHORTNESS OF BREATH: ICD-10-CM

## 2024-10-12 DIAGNOSIS — G47.33 OSA ON CPAP: ICD-10-CM

## 2024-10-12 DIAGNOSIS — R05.3 CHRONIC COUGH: ICD-10-CM

## 2024-10-12 DIAGNOSIS — R60.0 PERIPHERAL EDEMA: ICD-10-CM

## 2024-10-15 RX ORDER — ALBUTEROL SULFATE 0.83 MG/ML
2.5 SOLUTION RESPIRATORY (INHALATION) EVERY 4 HOURS PRN
Qty: 300 ML | Refills: 2 | Status: SHIPPED | OUTPATIENT
Start: 2024-10-15

## 2024-10-15 RX ORDER — HYDROCHLOROTHIAZIDE 25 MG/1
25 TABLET ORAL DAILY
Qty: 90 TABLET | Refills: 0 | Status: SHIPPED | OUTPATIENT
Start: 2024-10-15

## 2024-10-15 RX ORDER — GABAPENTIN 300 MG/1
CAPSULE ORAL
Qty: 120 CAPSULE | Refills: 0 | Status: SHIPPED | OUTPATIENT
Start: 2024-10-15

## 2024-11-09 DIAGNOSIS — F32.A DEPRESSION, UNSPECIFIED DEPRESSION TYPE: ICD-10-CM

## 2024-11-09 DIAGNOSIS — G47.09 OTHER INSOMNIA: ICD-10-CM

## 2024-11-09 DIAGNOSIS — I10 ESSENTIAL HYPERTENSION: ICD-10-CM

## 2024-11-11 NOTE — TELEPHONE ENCOUNTER
Requested Prescriptions     Pending Prescriptions Disp Refills    losartan (COZAAR) 25 MG Tab 200 Tablet 1     Sig: Take 2 Tablets by mouth every day.    escitalopram (LEXAPRO) 5 MG tablet 90 Tablet 1     Sig: Take 1 Tablet by mouth every day.    traZODone (DESYREL) 50 MG Tab 90 Tablet 1     Sig: Take 1 Tablet by mouth every evening.     Last office visit: 7/11/24  Last lab: 1/2/24

## 2024-11-12 RX ORDER — TRAZODONE HYDROCHLORIDE 50 MG/1
50 TABLET, FILM COATED ORAL NIGHTLY
Qty: 90 TABLET | Refills: 1 | Status: SHIPPED | OUTPATIENT
Start: 2024-11-12

## 2024-11-12 RX ORDER — LOSARTAN POTASSIUM 25 MG/1
50 TABLET ORAL
Qty: 200 TABLET | Refills: 1 | Status: SHIPPED | OUTPATIENT
Start: 2024-11-12

## 2024-11-12 RX ORDER — ESCITALOPRAM OXALATE 5 MG/1
5 TABLET ORAL DAILY
Qty: 90 TABLET | Refills: 1 | Status: SHIPPED | OUTPATIENT
Start: 2024-11-12